# Patient Record
Sex: FEMALE | Race: WHITE | NOT HISPANIC OR LATINO | Employment: OTHER | URBAN - METROPOLITAN AREA
[De-identification: names, ages, dates, MRNs, and addresses within clinical notes are randomized per-mention and may not be internally consistent; named-entity substitution may affect disease eponyms.]

---

## 2017-03-06 ENCOUNTER — GENERIC CONVERSION - ENCOUNTER (OUTPATIENT)
Dept: OTHER | Facility: OTHER | Age: 82
End: 2017-03-06

## 2017-03-08 ENCOUNTER — ALLSCRIPTS OFFICE VISIT (OUTPATIENT)
Dept: OTHER | Facility: OTHER | Age: 82
End: 2017-03-08

## 2017-03-08 DIAGNOSIS — Z13.820 ENCOUNTER FOR SCREENING FOR OSTEOPOROSIS: ICD-10-CM

## 2017-03-08 LAB
BUN SERPL-MCNC: 14 MG/DL (ref 8–27)
BUN/CREA RATIO (HISTORICAL): 14 (ref 11–26)
CHOLEST SERPL-MCNC: 180 MG/DL (ref 100–199)
CHOLEST/HDLC SERPL: 2.8 RATIO UNITS (ref 0–4.4)
CREAT SERPL-MCNC: 0.99 MG/DL (ref 0.57–1)
EGFR AFRICAN AMERICAN (HISTORICAL): 63 ML/MIN/1.73
EGFR-AMERICAN CALC (HISTORICAL): 54 ML/MIN/1.73
HDLC SERPL-MCNC: 65 MG/DL
LDLC SERPL CALC-MCNC: 83 MG/DL (ref 0–99)
TRIGL SERPL-MCNC: 159 MG/DL (ref 0–149)
VLDLC SERPL CALC-MCNC: 32 MG/DL (ref 5–40)

## 2017-03-10 ENCOUNTER — GENERIC CONVERSION - ENCOUNTER (OUTPATIENT)
Dept: OTHER | Facility: OTHER | Age: 82
End: 2017-03-10

## 2017-03-10 LAB — HBA1C MFR BLD HPLC: 6.1 % (ref 4.8–5.6)

## 2017-03-13 ENCOUNTER — GENERIC CONVERSION - ENCOUNTER (OUTPATIENT)
Dept: OTHER | Facility: OTHER | Age: 82
End: 2017-03-13

## 2017-06-14 ENCOUNTER — ALLSCRIPTS OFFICE VISIT (OUTPATIENT)
Dept: OTHER | Facility: OTHER | Age: 82
End: 2017-06-14

## 2017-06-14 LAB
A/G RATIO (HISTORICAL): 2.1 (ref 1.2–2.2)
ALBUMIN SERPL BCP-MCNC: 4.6 G/DL (ref 3.5–4.8)
ALP SERPL-CCNC: 46 IU/L (ref 39–117)
ALT SERPL W P-5'-P-CCNC: 18 IU/L (ref 0–32)
AST SERPL W P-5'-P-CCNC: 23 IU/L (ref 0–40)
BASOPHILS # BLD AUTO: 0 X10E3/UL (ref 0–0.2)
BASOPHILS # BLD AUTO: 1 %
BILIRUB SERPL-MCNC: 0.2 MG/DL (ref 0–1.2)
BUN SERPL-MCNC: 14 MG/DL (ref 8–27)
BUN/CREA RATIO (HISTORICAL): 14 (ref 12–28)
CALCIUM SERPL-MCNC: 10.1 MG/DL (ref 8.7–10.3)
CHLORIDE SERPL-SCNC: 98 MMOL/L (ref 96–106)
CHOLEST SERPL-MCNC: 202 MG/DL (ref 100–199)
CHOLEST/HDLC SERPL: 3.1 RATIO UNITS (ref 0–4.4)
CO2 SERPL-SCNC: 23 MMOL/L (ref 18–29)
CREAT SERPL-MCNC: 0.98 MG/DL (ref 0.57–1)
DEPRECATED RDW RBC AUTO: 13.8 % (ref 12.3–15.4)
EGFR AFRICAN AMERICAN (HISTORICAL): 63 ML/MIN/1.73
EGFR-AMERICAN CALC (HISTORICAL): 55 ML/MIN/1.73
EOSINOPHIL # BLD AUTO: 0.4 X10E3/UL (ref 0–0.4)
EOSINOPHIL # BLD AUTO: 5 %
GLUCOSE SERPL-MCNC: 123 MG/DL (ref 65–99)
HBA1C MFR BLD HPLC: 6.2 % (ref 4.8–5.6)
HCT VFR BLD AUTO: 42.9 % (ref 34–46.6)
HDLC SERPL-MCNC: 66 MG/DL
HGB BLD-MCNC: 14.1 G/DL (ref 11.1–15.9)
IMM.GRANULOCYTES (CD4/8) (HISTORICAL): 0.1 X10E3/UL (ref 0–0.1)
IMM.GRANULOCYTES (CD4/8) (HISTORICAL): 1 %
LDLC SERPL CALC-MCNC: 93 MG/DL (ref 0–99)
LYMPHOCYTES # BLD AUTO: 2.4 X10E3/UL (ref 0.7–3.1)
LYMPHOCYTES # BLD AUTO: 30 %
MCH RBC QN AUTO: 30.1 PG (ref 26.6–33)
MCHC RBC AUTO-ENTMCNC: 32.9 G/DL (ref 31.5–35.7)
MCV RBC AUTO: 92 FL (ref 79–97)
MONOCYTES # BLD AUTO: 0.6 X10E3/UL (ref 0.1–0.9)
MONOCYTES (HISTORICAL): 8 %
NEUTROPHILS # BLD AUTO: 4.5 X10E3/UL (ref 1.4–7)
NEUTROPHILS # BLD AUTO: 55 %
PLATELET # BLD AUTO: 213 X10E3/UL (ref 150–379)
POTASSIUM SERPL-SCNC: 4.4 MMOL/L (ref 3.5–5.2)
RBC (HISTORICAL): 4.69 X10E6/UL (ref 3.77–5.28)
SODIUM SERPL-SCNC: 138 MMOL/L (ref 134–144)
TOT. GLOBULIN, SERUM (HISTORICAL): 2.2 G/DL (ref 1.5–4.5)
TOTAL PROTEIN (HISTORICAL): 6.8 G/DL (ref 6–8.5)
TRIGL SERPL-MCNC: 214 MG/DL (ref 0–149)
VLDLC SERPL CALC-MCNC: 43 MG/DL (ref 5–40)
WBC # BLD AUTO: 8 X10E3/UL (ref 3.4–10.8)

## 2017-06-15 ENCOUNTER — GENERIC CONVERSION - ENCOUNTER (OUTPATIENT)
Dept: OTHER | Facility: OTHER | Age: 82
End: 2017-06-15

## 2017-06-15 LAB
CREATININE, URINE (HISTORICAL): 113.8 MG/DL
MICROALBUM.,U,RANDOM (HISTORICAL): 8.5 UG/ML
MICROALBUMIN/CREATININE RATIO (HISTORICAL): 7.5 MG/G CREAT (ref 0–30)

## 2017-09-06 ENCOUNTER — ALLSCRIPTS OFFICE VISIT (OUTPATIENT)
Dept: OTHER | Facility: OTHER | Age: 82
End: 2017-09-06

## 2017-09-13 ENCOUNTER — ALLSCRIPTS OFFICE VISIT (OUTPATIENT)
Dept: OTHER | Facility: OTHER | Age: 82
End: 2017-09-13

## 2017-09-13 LAB
BILIRUB UR QL STRIP: NORMAL
CLARITY UR: NORMAL
COLOR UR: YELLOW
GLUCOSE (HISTORICAL): 50
HGB UR QL STRIP.AUTO: NORMAL
KETONES UR STRIP-MCNC: NORMAL MG/DL
LEUKOCYTE ESTERASE UR QL STRIP: 25
NITRITE UR QL STRIP: NORMAL
PH UR STRIP.AUTO: 6 [PH]
PROT UR STRIP-MCNC: NORMAL MG/DL
SP GR UR STRIP.AUTO: 1.02
UROBILINOGEN UR QL STRIP.AUTO: NORMAL

## 2017-09-18 ENCOUNTER — GENERIC CONVERSION - ENCOUNTER (OUTPATIENT)
Dept: OTHER | Facility: OTHER | Age: 82
End: 2017-09-18

## 2017-11-22 ENCOUNTER — GENERIC CONVERSION - ENCOUNTER (OUTPATIENT)
Dept: OTHER | Facility: OTHER | Age: 82
End: 2017-11-22

## 2017-12-06 ENCOUNTER — GENERIC CONVERSION - ENCOUNTER (OUTPATIENT)
Dept: OTHER | Facility: OTHER | Age: 82
End: 2017-12-06

## 2017-12-06 ENCOUNTER — ALLSCRIPTS OFFICE VISIT (OUTPATIENT)
Dept: OTHER | Facility: OTHER | Age: 82
End: 2017-12-06

## 2017-12-06 LAB
BILIRUB UR QL STRIP: NORMAL
CLARITY UR: NORMAL
COLOR UR: YELLOW
GLUCOSE (HISTORICAL): NORMAL
HBA1C MFR BLD HPLC: 5.4 %
HGB UR QL STRIP.AUTO: NORMAL
KETONES UR STRIP-MCNC: NORMAL MG/DL
LEUKOCYTE ESTERASE UR QL STRIP: NORMAL
NITRITE UR QL STRIP: NORMAL
PH UR STRIP.AUTO: 7 [PH]
PROT UR STRIP-MCNC: NORMAL MG/DL
SP GR UR STRIP.AUTO: 1.01
UROBILINOGEN UR QL STRIP.AUTO: NORMAL

## 2017-12-07 LAB — MICROALBUM.,U,RANDOM (HISTORICAL): <3 UG/ML

## 2017-12-07 NOTE — PROGRESS NOTES
Assessment    1  Diabetes mellitus (250 00) (E11 9)    Plan  Diabetes mellitus    · (LC) Microalbumin, Random Urine; Status:Active - Retrospective By ProtocolAuthorization; Requested for:56Cbf9708;    · Hemoglobin A1c- POC; Status:Complete - Retrospective Authorization;   Done:25Cpy7917 12:00AM   · Urine Dip Automated- POC; Status:Complete - Retrospective By Protocol Authorization;  Done: 23VTC0218 12:00AM   · Follow-up visit in 3 months Evaluation and Treatment  Follow-up  Status: Hold For -Scheduling  Requested for: 62WYB0068   · Have your eyes examined by an eye doctor every year ; Status:Complete;   Done:26Qxu7349 01:03PM   · It is important to take good care of your feet if you have diabetes ; Status:Complete;  Done: 35KGM2403 01:03PM   · We recommend that you follow the Mediterranean diet ; Status:Complete;   Done:59Kpc0723 01:03PM    Discussion/Summary    Decrease Metformin to 500 mg  BID  Eye exam Dr Steven Mata  Does have a living will  FS  Chief Complaint  Patient is here today for a diabetic visit, MOISES Butler/JUNO      History of Present Illness  The patient states she has been doing well with her Type II Diabetes control since the last visit  She has no comorbid illnesses  She has no known diabetic complications  She has no significant interval events  Symptoms: The patient is currently asymptomatic  Associated symptoms include no polyuria-- and-- no polydipsia  Home monitoring: The patient is not checking blood sugars at home  -- Glycemic control has been good  -- the patient reports no symptomatic hypoglycemic episodes  Medications: the patient is adherent with her medication regimen  -- She denies medication side effects  The patient is doing well with her diabetes goals  Due For: a hemoglobin A1c  Review of Systems   Constitutional: No fever, no chills, feels well, no tiredness, no recent weight gain or weight loss    Cardiovascular: No complaints of slow heart rate, no fast heart rate, no chest pain, no palpitations, no leg claudication, no lower extremity edema  Respiratory: No complaints of shortness of breath, no wheezing, no cough, no SOB on exertion, no orthopnea, no PND  Neurological: No complaints of headache, no confusion, no convulsions, no numbness, no dizziness or fainting, no tingling, no limb weakness, no difficulty walking  Active Problems  1  Acute lower UTI (599 0) (N39 0)   2  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)   3  Body mass index (BMI) 24 0-24 9, adult (V85 1) (Z68 24)   4  Diabetes mellitus (250 00) (E11 9)   5  Feeling of chest tightness (786 59) (R07 89)   6  Flu vaccine need (V04 81) (Z23)   7  Hyperlipidemia (272 4) (E78 5)   8  Nausea and/or vomiting (787 01) (R11 2)   9  Need for influenza vaccination (V04 81) (Z23)   10  Need for pneumococcal vaccination (V03 82) (Z23)   11  Osteoporosis screening (V82 81) (Z13 820)   12  Psoriasis (696 1) (L40 9)   13  Screening for diabetic retinopathy (V80 2) (Z13 5)    Past Medical History  1  History of influenza vaccination (V49 89) (Z92 29)   2  History of HTN (hypertension) (401 9) (I10)   3  History of Need for pneumococcal vaccination (V03 82) (Z23)   4  History of Osteoarthritis (715 90) (M19 90)    The active problems and past medical history were reviewed and updated today  Surgical History  1  Denied: History Of Prior Surgery    The surgical history was reviewed and updated today  Family History  Mother    1  Family history of   Father    2  Family history of    3  Family history of myocardial infarction (V17 3) (Z82 49)  Sister    4  Family history of    5  Family history of cardiac disorder (V17 49) (Z82 49)   6  Family history of diabetes mellitus (V18 0) (Z83 3)   7  Family history of malignant neoplasm of breast (V16 3) (Z80 3)  Brother    8  Family history of cardiac disorder (V17 49) (Z82 49)  Family History    9  Denied: Family history of substance abuse   10   Denied: FH: mental illness    The family history was reviewed and updated today  Social History     · Active advance directive (V49 89) (Z78 9)   · Caffeine use (V49 89) (F15 90)   · Dental care, regularly   · Never smoker   · No alcohol use  The social history was reviewed and updated today  Current Meds   1  Aleve 220 MG Oral Capsule; PRN; Therapy: (Recorded:08Jun2016) to Recorded   2  ALPRAZolam 0 5 MG Oral Tablet; TAKE 1 TABLET AT BEDTIME AS NEEDED; Last Rx:29Nov2017 Ordered   3  Caltrate 600 TABS; TAKE 1 TABLET DAILY; Therapy: (Recorded:08Jun2016) to Recorded   4  Centrum Silver Ultra Womens Oral Tablet; TAKE 1 TABLET DAILY; Therapy: (Recorded:08Jun2016) to Recorded   5  Claritin TABS; PRN; Therapy: (Recorded:08Jun2016) to Recorded   6  Fish Oil 1200 MG Oral Capsule; take 1 capsule daily; Therapy: (Recorded:08Jun2016) to Recorded   7  FLUoxetine HCl - 20 MG Oral Capsule; 1 every day; Therapy: 48ITF5127 to (Evaluate:37Hme7446)  Requested for: 45MZK5048; Last Rx:28Vus4977 Ordered   8  Gaviscon SUSP; USE AS DIRECTED; Therapy: (Recorded:08Jun2016) to Recorded   9  Glucosamine Chondr 500 Complex Oral Capsule; Therapy: (Recorded:03Uja1149) to Recorded   10  Imodium A-D 2 MG Oral Tablet; PRN; Therapy: (Recorded:08Jun2016) to Recorded   11  MetFORMIN HCl - 500 MG Oral Tablet; TAKE TWO TABLETS BY MOUTH TWICE A DAY; Therapy: 11FCT6710 to (Evaluate:16Fhn6521)  Requested for: 73HRJ0232; Last  Rx:20Nov2017 Ordered   12  Mucinex 600 MG Oral Tablet Extended Release 12 Hour; PRN; Therapy: (Recorded:08Jun2016) to Recorded   13  Naphcon-A 0 025-0 3 % Ophthalmic Solution; PRN; Therapy: (Recorded:08Jun2016) to Recorded   14  Ondansetron HCl - 4 MG Oral Tablet; 1 PO BID PRN; Therapy: 04Ooc6938 to (Evaluate:21Ulh4828)  Requested for: 50Uam5437; Last  Rx:77Lmx7933 Ordered   15  OneTouch Delica Lancets 46K Miscellaneous; USE TWICE A DAY FOR BLOOD SUGAR  TESTING  Requested for: 79EUG2613; Last Rx:60Vee2859 Ordered   16   OneTouch Lancets Miscellaneous; USE AS DIRECTED; Therapy: 59XUT7631 to (Last Rx:22Yoc4533)  Requested for: 95SZN3758 Ordered   17  OneTouch Ultra Blue In Vitro Strip; TEST TWICE DAILY; Therapy: 98QJS8084 to ()  Requested for: 80Odl5323; Last  Rx:10Hwu2235 Ordered   18  RABEprazole Sodium 20 MG Oral Tablet Delayed Release; 1 every day; Therapy: 97Ynw9656 to Recorded   19  Simvastatin 20 MG Oral Tablet; TAKE BY MOUTH ONE TABLET AT BEDTIME; Therapy: 11Cra6659 to (Evaluate:85Qww7225)  Requested for: 60LGJ5580; Last  Rx:20Nov2017 Ordered   20  Sulfacetamide Sodium-Sulfur 9 8-4 8 % External Cream; APPLY TWICE A DAY; Therapy: 86XAP8834 to (Evaluate:54Gpk5822)  Requested for: 64HQM1803; Last  Rx:55Ocf0690 Ordered   21  Sulfamethoxazole-Trimethoprim 800-160 MG Oral Tablet; TAKE 1 TABLET TWICE DAILY  UNTIL FINISHED; Therapy: 92Qpr6802 to (Evaluate:06Tpw0050)  Requested for: 77Jfx5562; Last  Rx:44Ovg9313 Ordered   22  Turmeric 500 MG Oral Capsule; take 1 capsule daily; Therapy: (Recorded:08Jun2016) to Recorded   23  Vitamin C TABS; TAKE 1 TABLET DAILY; Therapy: (Recorded:08Jun2016) to Recorded    The medication list was reviewed and updated today  Allergies  1  Dairy    Vitals  Vital Signs    Recorded: 28NFU4260 11:15AM   Temperature 97 9 F, Temporal   Heart Rate 80, R Radial   Pulse Quality Normal, R Radial   Respiration Quality Normal   Respiration 16   Systolic 932, LUE, Sitting   Diastolic 70, LUE, Sitting   Height 5 ft 4 5 in   Weight 141 lb    BMI Calculated 23 83   BSA Calculated 1 7   O2 Saturation 97       Physical Exam   Constitutional  General appearance: No acute distress, well appearing and well nourished  Pulmonary  Respiratory effort: No increased work of breathing or signs of respiratory distress  Auscultation of lungs: Clear to auscultation  Cardiovascular  Auscultation of heart: Normal rate and rhythm, normal S1 and S2, without murmurs  Additional Exam:  A1C 5 4  Results/Data  Urine Dip Automated- POC 02HDN7573 12:00AM Illa Fabian     Test Name Result Flag Reference   Color Yellow       Clarity Transparent     Leukocytes neg     Nitrite neg     Blood neg     Bilirubin neg     Urobilinogen norm     Protein neg     Ph 7     Specific Gravity 1 010     Ketone neg     Glucose norm       Hemoglobin A1c- POC 63FGO7830 12:00AM Illa Fabian     Test Name Result Flag Reference   HEMOGLOBIN A1C 5 4         Future Appointments    Date/Time Provider Specialty Site   03/14/2018 11:15 AM Keely Fabian MD Family Medicine  [facility]       Signatures   Electronically signed by : Marcelle Mera MD; Dec  6 2017  1:07PM EST                       (Author)

## 2018-01-09 NOTE — RESULT NOTES
Message   Please notify diabetes excellent   FS     Verified Results  (1) HEMOGLOBIN A1C 15Twv1656 12:51PM Keely Fabian     Test Name Result Flag Reference   Hemoglobin A1c 6 2 % H 4 8-5 6   Pre-diabetes: 5 7 - 6 4           Diabetes: >6 4           Glycemic control for adults with diabetes: <7 0

## 2018-01-11 NOTE — RESULT NOTES
Message   A1C 6 1 excellent   FS     Verified Results  (1) HEMOGLOBIN A1C 16ZRT8485 01:44PM Ashanti King     Test Name Result Flag Reference   Hemoglobin A1c 6 1 % H 4 8-5 6   Pre-diabetes: 5 7 - 6 4           Diabetes: >6 4           Glycemic control for adults with diabetes: <7 0

## 2018-01-11 NOTE — RESULT NOTES
Message   Please notify all labs perfect  FS     Verified Results  (1) LIPID PANEL, FASTING 19OMG4038 01:44PM Reno Jansen     Test Name Result Flag Reference   Cholesterol, Total 180 mg/dL  100-199   Triglycerides 159 mg/dL H 0-149   HDL Cholesterol 65 mg/dL  >39   VLDL Cholesterol Marcial 32 mg/dL  5-40   LDL Cholesterol Calc 83 mg/dL  0-99   T  Chol/HDL Ratio 2 8 ratio units  0 0-4 4   T  Chol/HDL Ratio                                                             Men  Women                                               1/2 Avg  Risk  3 4    3 3                                                   Avg Risk  5 0    4 4                                                2X Avg  Risk  9 6    7 1                                                3X Avg  Risk 23 4   11 0

## 2018-01-11 NOTE — RESULT NOTES
Message   Please review labs  Diabetes excellent control  Labs perfect  FS     Verified Results  (1) CBC/PLT/DIFF 51RMM6751 12:59PM Reginaldo Clayton     Test Name Result Flag Reference   WBC 7 7 x10E3/uL  3 4-10 8   RBC 4 69 x10E6/uL  3 77-5 28   Hemoglobin 14 0 g/dL  11 1-15 9   Hematocrit 41 9 %  34 0-46  6   MCV 89 fL  79-97   MCH 29 9 pg  26 6-33 0   MCHC 33 4 g/dL  31 5-35 7   RDW 14 5 %  12 3-15 4   Platelets 333 O92K0/LN  150-379   Neutrophils 58 %     Lymphs 26 %     Monocytes 7 %     Eos 8 %     Basos 1 %     Neutrophils (Absolute) 4 5 x10E3/uL  1 4-7 0   Lymphs (Absolute) 2 0 x10E3/uL  0 7-3 1   Monocytes(Absolute) 0 5 x10E3/uL  0 1-0 9   Eos (Absolute) 0 6 x10E3/uL H 0 0-0 4   Baso (Absolute) 0 1 x10E3/uL  0 0-0 2   Immature Granulocytes 0 %     Immature Grans (Abs) 0 0 x10E3/uL  0 0-0 1

## 2018-01-13 VITALS
DIASTOLIC BLOOD PRESSURE: 82 MMHG | WEIGHT: 147 LBS | HEIGHT: 65 IN | TEMPERATURE: 96.8 F | BODY MASS INDEX: 24.49 KG/M2 | OXYGEN SATURATION: 97 % | SYSTOLIC BLOOD PRESSURE: 138 MMHG | RESPIRATION RATE: 12 BRPM | HEART RATE: 85 BPM

## 2018-01-13 VITALS
OXYGEN SATURATION: 99 % | DIASTOLIC BLOOD PRESSURE: 70 MMHG | HEIGHT: 65 IN | TEMPERATURE: 98.9 F | WEIGHT: 146 LBS | HEART RATE: 80 BPM | RESPIRATION RATE: 16 BRPM | BODY MASS INDEX: 24.32 KG/M2 | SYSTOLIC BLOOD PRESSURE: 120 MMHG

## 2018-01-13 VITALS
HEART RATE: 84 BPM | DIASTOLIC BLOOD PRESSURE: 70 MMHG | WEIGHT: 146 LBS | HEIGHT: 65 IN | SYSTOLIC BLOOD PRESSURE: 120 MMHG | BODY MASS INDEX: 24.32 KG/M2 | RESPIRATION RATE: 16 BRPM | TEMPERATURE: 97.4 F

## 2018-01-14 VITALS
HEIGHT: 65 IN | HEART RATE: 70 BPM | RESPIRATION RATE: 16 BRPM | DIASTOLIC BLOOD PRESSURE: 62 MMHG | BODY MASS INDEX: 24.32 KG/M2 | SYSTOLIC BLOOD PRESSURE: 120 MMHG | WEIGHT: 146 LBS | TEMPERATURE: 97.9 F

## 2018-01-16 NOTE — RESULT NOTES
Verified Results  (1) CBC/PLT/DIFF 90TBR3091 01:19PM [employer]     Test Name Result Flag Reference   WBC 8 0 x10E3/uL  3 4-10 8   RBC 4 69 x10E6/uL  3 77-5 28   Hemoglobin 14 1 g/dL  11 1-15 9   Hematocrit 42 9 %  34 0-46  6   MCV 92 fL  79-97   MCH 30 1 pg  26 6-33 0   MCHC 32 9 g/dL  31 5-35 7   RDW 13 8 %  12 3-15 4   Platelets 653 U68O0/WK  150-379   Neutrophils 55 %     Lymphs 30 %     Monocytes 8 %     Eos 5 %     Basos 1 %     Neutrophils (Absolute) 4 5 x10E3/uL  1 4-7 0   Lymphs (Absolute) 2 4 x10E3/uL  0 7-3 1   Monocytes(Absolute) 0 6 x10E3/uL  0 1-0 9   Eos (Absolute) 0 4 x10E3/uL  0 0-0 4   Baso (Absolute) 0 0 x10E3/uL  0 0-0 2   Immature Granulocytes 1 %     Immature Grans (Abs) 0 1 x10E3/uL  0 0-0 1     (1) COMPREHENSIVE METABOLIC PANEL 36LVN5695 24:41NU [employer]     Test Name Result Flag Reference   Glucose, Serum 123 mg/dL H 65-99   BUN 14 mg/dL  8-27   Creatinine, Serum 0 98 mg/dL  0 57-1 00   BUN/Creatinine Ratio 14  12-28   Sodium, Serum 138 mmol/L  134-144   Potassium, Serum 4 4 mmol/L  3 5-5 2   Chloride, Serum 98 mmol/L     Carbon Dioxide, Total 23 mmol/L  18-29   Calcium, Serum 10 1 mg/dL  8 7-10 3   Protein, Total, Serum 6 8 g/dL  6 0-8 5   Albumin, Serum 4 6 g/dL  3 5-4 8   Globulin, Total 2 2 g/dL  1 5-4 5   A/G Ratio 2 1  1 2-2 2   Bilirubin, Total 0 2 mg/dL  0 0-1 2   Alkaline Phosphatase, S 46 IU/L     AST (SGOT) 23 IU/L  0-40   ALT (SGPT) 18 IU/L  0-32   eGFR If NonAfricn Am 55 mL/min/1 73 L >59   eGFR If Africn Am 63 mL/min/1 73  >59     (1) LIPID PANEL, FASTING [id number] 01:19PM Latrice Johnson     Test Name Result Flag Reference   Cholesterol, Total 202 mg/dL H 100-199   Triglycerides 214 mg/dL H 0-149   HDL Cholesterol 66 mg/dL  >39   VLDL Cholesterol Marcial 43 mg/dL H 5-40   LDL Cholesterol Calc 93 mg/dL  0-99   T  Chol/HDL Ratio 3 1 ratio units  0 0-4 4   T   Chol/HDL Ratio                                                             Men  Women 1/2 Avg  Risk  3 4    3 3                                                   Avg Risk  5 0    4 4                                                2X Avg  Risk  9 6    7 1                                                3X Avg  Risk 23 4   11 0     (1) HEMOGLOBIN A1C [id number] 01:19PM [facility] Joaquint     Test Name Result Flag Reference   Hemoglobin A1c 6 2 % H 4 8-5 6   Pre-diabetes: 5 7 - 6 4           Diabetes: >6 4           Glycemic control for adults with diabetes: <7 0

## 2018-01-17 NOTE — PROGRESS NOTES
Assessment    1  Encounter for preventive health examination (V70 0) (Z00 00)   2  Psoriasis (696 1) (L40 9)    Plan  Diabetes mellitus    · (1) HEMOGLOBIN A1C; Status:Active; Requested JJT:[id number];   Diabetes mellitus, Health Maintenance, Hyperlipidemia    · (1) CBC/PLT/DIFF; Status:Active; Requested [id number];    · (1) LIPID PANEL, FASTING; Status:Active; Requested RWZ:[id number]; Health Maintenance    · (1) COMPREHENSIVE METABOLIC PANEL; Status:Active; Requested VTY:[id number];    · (1) MICROALBUMIN CREATININE RATIO, RANDOM URINE; Status:Active; Requested  BOD:[id number];    · Always use a seat belt and shoulder strap when riding or driving a motor vehicle ;  Status:Complete;   Done: 43DYE7014   · Begin a limited exercise program ; Status:Complete;   Done: 39PVS1996   · Use a sun block product with an SPF of 15 or more ; Status:Complete;   Done:  10IGF2088   · We recommend that you follow the "Mediterranean diet "; Status:Complete;   Done:  15PIR4718   · Follow-up visit in 3 months Evaluation and Treatment  Follow-up  Status: Complete   Done: 74XSB2014  Psoriasis    · Sulfacetamide Sodium-Sulfur 9 8-4 8 % External Cream; APPLY TWICE A DAY    Discussion/Summary      Cardiovascular screening and counseling: due for a lipid panel  Diabetes screening and counseling: due for blood glucose  Colorectal cancer screening and counseling: screening is current  Breast cancer screening and counseling: screening is current  Cervical cancer screening and counseling: screening not indicated  Osteoporosis screening and counseling: screening not indicated  Abdominal aortic aneurysm screening and counseling: screening not indicated  Glaucoma screening and counseling: screening is current  HIV screening and counseling: screening not indicated  Advance Directive Planning: complete and up to date  Patient Discussion: plan discussed with the patient, follow-up visit needed in one year        Chief Complaint  Patient presents for AWV (dli)      History of Present Illness  Welcome to Medicare and Wellness Visits: The patient is being seen for the subsequent annual wellness visit  Medicare Screening and Risk Factors   Hospitalizations: no previous hospitalizations  Medicare Screening Tests Risk Questions   Abdominal aortic aneurysm risk assessment: none indicated  HIV risk assessment: none indicated  Drug and Alcohol Use: The patient has never smoked cigarettes  The patient reports never drinking alcohol  Alcohol concern:   The patient has no concerns about alcohol abuse  She has never used illicit drugs  Diet and Physical Activity: Current diet includes well balanced meals, low fat food choices, low carbohydrate food choices, low salt food choices, 3-4 servings of fruit per day, 2-3 servings of vegetables per day, 2 servings of meat per day, 1 servings of whole grains per day, 3-4 servings of simple carbohydrates per day, 0 servings of dairy products per day and 3 cups of coffee per day  Mood Disorder and Cognitive Impairment Screening: PHQ-9 Depression Scale   Over the past 2 weeks, how often have you been bothered by the following problems? 1 ) Little interest or pleasure in doing things? Not at all    2 ) Feeling down, depressed or hopeless? Not at all  Functional Ability/Level of Safety: Hearing is significantly decreased, a hearing aid is used and 2  She reports hearing difficulties  The patient is currently able to do activities of daily living without limitations, able to do instrumental activities of daily living without limitations, able to participate in social activities without limitations and able to drive without limitations   Activities of daily living details: does not need help using the phone, no transportation help needed, does not need help shopping, no meal preparation help needed, does not need help doing housework, does not need help doing laundry, does not need help managing medications and does not need help managing money  Home safety risk factors:  no unfamiliar surroundings, no loose rugs, no poor household lighting, no uneven floors, no household clutter, grab bars in the bathroom and handrails on the stairs  Advance Directives: Advance directives: living will, durable power of  for health care directives and advance directives  I agree with the patient's decisions  Co-Managers and Medical Equipment/Suppliers: See Patient Care Team      Review of Systems    Constitutional: negative  Eyes: negative  ENT: negative  Cardiovascular: negative  Respiratory: negative  Gastrointestinal: negative  Genitourinary: negative  Musculoskeletal: negative  Integumentary and Breasts: negative  Neurological: negative  Psychiatric: negative  Endocrine: negative  Hematologic and Lymphatic: negative  Active Problems    1  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)   2  Body mass index (BMI) 24 0-24 9, adult (V85 1) (Z68 24)   3  Diabetes mellitus (250 00) (E11 9)   4  Feeling of chest tightness (786 59) (R07 89)   5  Flu vaccine need (V04 81) (Z23)   6  Hyperlipidemia (272 4) (E78 5)   7  Need for influenza vaccination (V04 81) (Z23)   8  Need for pneumococcal vaccination (V03 82) (Z23)   9  Osteoporosis screening (V82 81) (Z13 820)   10  Screening for diabetic retinopathy (V80 2) (Z13 5)    Past Medical History    · History of HTN (hypertension) (401 9) (I10)   · History of Need for pneumococcal vaccination (V03 82) (Z23)   · History of Osteoarthritis (715 90) (M19 90)    The active problems and past medical history were reviewed and updated today  Surgical History    · Denied: History Of Prior Surgery    The surgical history was reviewed and updated today         Family History  Mother    · Family history of   Father    · Family history of    · Family history of myocardial infarction (V17 3) (Z82 49)  Sister    · Family history of    · Family history of cardiac disorder (V17 49) (Z82 49)   · Family history of diabetes mellitus (V18 0) (Z83 3)   · Family history of malignant neoplasm of breast (V16 3) (Z80 3)  Brother    · Family history of cardiac disorder (V17 49) (Z82 49)  Family History    · Denied: FH: mental illness    The family history was reviewed and updated today  Social History    · Caffeine use (V49 89) (F15 90)   · Dental care, regularly   · Never smoker   · No alcohol use  The social history was reviewed and updated today  Current Meds   1  Aleve 220 MG Oral Capsule; PRN; Therapy: (Recorded:2016) to Recorded   2  ALPRAZolam 0 5 MG Oral Tablet; TAKE 1 TABLET AT BEDTIME AS NEEDED; Last   Rx:2017 Ordered   3  Caltrate 600 TABS; TAKE 1 TABLET DAILY; Therapy: (Recorded:2016) to Recorded   4  Centrum Silver Ultra Womens Oral Tablet; TAKE 1 TABLET DAILY; Therapy: (Recorded:2016) to Recorded   5  Claritin TABS; PRN; Therapy: (Recorded:2016) to Recorded   6  Fish Oil 1200 MG Oral Capsule; take 1 capsule daily; Therapy: (Recorded:2016) to Recorded   7  FLUoxetine HCl - 20 MG Oral Capsule; 1 every day; Therapy: 09THI8948 to (Evaluate:27Hua8750)  Requested for: 2016; Last   Rx:2016 Ordered   8  Gaviscon SUSP; USE AS DIRECTED; Therapy: (Recorded:2016) to Recorded   9  Imodium A-D 2 MG Oral Tablet; PRN; Therapy: (Recorded:2016) to Recorded   10  MetFORMIN HCl - 500 MG Oral Tablet; TAKE 2 TABLETS IN THE MORNING AND 2    TABLETS IN THE EVENING; Therapy: 78REM7592 to (Last Rx:61Lzs8108)  Requested for: 40PTL8190 Ordered   11  Mucinex 600 MG Oral Tablet Extended Release 12 Hour; PRN; Therapy: (Recorded:2016) to Recorded   12  Naphcon-A 0 025-0 3 % Ophthalmic Solution; PRN; Therapy: (Recorded:2016) to Recorded   13   OneTouch Delica Lancets 38D Miscellaneous; USE TWICE A DAY FOR BLOOD SUGAR    TESTING  Requested for: 49MJU4511; Last Rx:91Qyn3616 Ordered   14  OneTouch Lancets Miscellaneous; USE AS DIRECTED; Therapy: 24CSA7671 to (Last Rx:95Tfy9585)  Requested for: 86RWI0541 Ordered   15  OneTouch Ultra Blue In Vitro Strip; TEST TWICE DAILY; Therapy: 07UQQ2087 to (Evaluate:78Zet2608)  Requested for: 21Anh8194; Last    Rx:91Gld9986 Ordered   16  RABEprazole Sodium 20 MG Oral Tablet Delayed Release; 1 every day; Therapy: 19Zeh4286 to Recorded   17  Simvastatin 20 MG Oral Tablet; TAKE BY MOUTH ONE TABLET AT BEDTIME; Therapy: 42Dcv0320 to (Evaluate:16Nov2017)  Requested for: 21Nov2016; Last    Rx:21Nov2016 Ordered   18  Turmeric 500 MG Oral Capsule; take 1 capsule daily; Therapy: (Recorded:08Jun2016) to Recorded   19  Vitamin C TABS; TAKE 1 TABLET DAILY; Therapy: (Recorded:08Jun2016) to Recorded    The medication list was reviewed and updated today  Allergies    1  No Known Drug Allergies    2  Dairy    Immunizations   1 2 3    Influenza  29-Oct-2014 22-Oct-2015 14-Sep-2016    PCV  08-Mar-2017      PPSV  21-Oct-2014      Tdap  21-Nov-2008       Vitals  Signs    Temperature: 97 4 F  Heart Rate: 84  Respiration: 16  Systolic: 865  Diastolic: 70  Height: 5 ft 4 5 in  Weight: 146 lb   BMI Calculated: 24 67  BSA Calculated: 1 72    Physical Exam    Constitutional   General appearance: No acute distress, well appearing and well nourished  Eyes   Conjunctiva and lids: No swelling, erythema or discharge  Pupils and irises: Equal, round, reactive to light  Ophthalmoscopic examination: Normal fundi and optic discs  Ears, Nose, Mouth, and Throat   External inspection of ears and nose: Normal     Otoscopic examination: Tympanic membranes translucent with normal light reflex  Canals patent without erythema  Hearing: Normal     Nasal mucosa, septum, and turbinates: Normal without edema or erythema  Lips, teeth, and gums: Normal, good dentition  Oropharynx: Normal with no erythema, edema, exudate or lesions      Neck Neck: Supple, symmetric, trachea midline, no masses  Thyroid: Normal, no thyromegaly  Pulmonary   Respiratory effort: No increased work of breathing or signs of respiratory distress  Auscultation of lungs: Clear to auscultation  Cardiovascular   Auscultation of heart: Normal rate and rhythm, normal S1 and S2, no murmurs  Carotid pulses: 2+ bilaterally  Abdominal aorta: Normal     Examination of extremities for edema and/or varicosities: Normal     Abdomen   Abdomen: Non-tender, no masses  Liver and spleen: No hepatomegaly or splenomegaly  Lymphatic   Palpation of lymph nodes in neck: No lymphadenopathy  Musculoskeletal   Gait and station: Normal     Digits and nails: Normal without clubbing or cyanosis  Joints, bones, and muscles: Normal     Range of motion: Normal     Stability: Normal     Muscle strength/tone: Normal     Skin   Skin and subcutaneous tissue: Normal without rashes or lesions  Palpation of skin and subcutaneous tissue: Normal turgor  Neurologic   Cranial nerves: Cranial nerves II-XII intact  Reflexes: 2+ and symmetric  Sensation: No sensory loss  Psychiatric   Judgment and insight: Normal     Orientation to person, place, and time: Normal     Recent and remote memory: Intact  Mood and affect: Normal        Results/Data  *VB - Urinary Incontinence Screen (Dx Z13 89 Screen for UI) 27UOH6695 11:15AM      Test Name Result Flag Reference   Urinary Incontinence Assessment [id number]       PHQ-2 Adult Depression Screening 14Jun2017 11:14AM User, [facility]     Test Name Result Flag Reference   PHQ-2 Adult Depression Score 0     Over the last two weeks, how often have you been bothered by any of the following problems?   Little interest or pleasure in doing things: Not at all - 0  Feeling down, depressed, or hopeless: Not at all - 0   PHQ-2 Adult Depression Screening Negative       Falls Risk Assessment (Dx Z13 89 Screen for Neurologic Disorder) 17VLH2889 11:14AM User, [facility]     Test Name Result Flag Reference   Falls Risk      No falls in the past year       Future Appointments    Date/Time Provider Specialty Site   09/06/2017 11:15 AM Donnie Magana MD [address] [facility]   09/13/2017 11:15 AM Donnie Magana  [facility]     Signatures   Electronically signed by : Sebastien Ordoñez MD; Jun 14 2017  1:14PM EST                       (Author)

## 2018-01-22 VITALS
WEIGHT: 142 LBS | DIASTOLIC BLOOD PRESSURE: 70 MMHG | SYSTOLIC BLOOD PRESSURE: 140 MMHG | RESPIRATION RATE: 16 BRPM | HEART RATE: 96 BPM | BODY MASS INDEX: 23.66 KG/M2 | HEIGHT: 65 IN | TEMPERATURE: 97.4 F

## 2018-01-23 VITALS
BODY MASS INDEX: 23.49 KG/M2 | TEMPERATURE: 97.9 F | SYSTOLIC BLOOD PRESSURE: 138 MMHG | HEART RATE: 80 BPM | OXYGEN SATURATION: 97 % | WEIGHT: 141 LBS | HEIGHT: 65 IN | DIASTOLIC BLOOD PRESSURE: 70 MMHG | RESPIRATION RATE: 16 BRPM

## 2018-01-23 NOTE — PROGRESS NOTES
Assessment    1  Encounter for preventive health examination (V70 0) (Z00 00)    Plan  Diabetes mellitus    · (1) HEMOGLOBIN A1C; Status:Active; Requested VRA:[id number];    · (1) MICROALBUMIN CREATININE RATIO, RANDOM URINE; Status:Active; Requested  EYS:[id number];   Diabetes mellitus, Health Maintenance    · Urine Dip Automated- POC; Status:Resulted - Requires Verification,Retrospective  Authorization;   Done: 87GOJ4601 01:27PM  Diabetes mellitus, Hyperlipidemia    · (1) CBC/PLT/DIFF; Status:Active; Requested GIR:[id number];    · (1) LIPID PANEL, FASTING; Status:Active; Requested RDE:96WQX4743;    · (1) TSH; Status:Active; Requested ZGV:57NOA5279;    · (1) COMPREHENSIVE METABOLIC PANEL; Status:Active; Requested MIKHAIL:77PMX6898;    · (1) VITAMIN B12; Status:Active; Requested CEE:10JBC5845; Health Maintenance    · Follow-up visit in 1 year Evaluation and Treatment  Follow-up  Status: Hold For -  Scheduling  Requested for: 14IYQ3886   · Always use a seat belt and shoulder strap when riding or driving a motor vehicle ;  Status:Complete;   Done: 13HRA0572   · These are things you can do to prevent falls in and around the home ; Status:Complete;    Done: 48GIV2633   · We recommend that you follow the "Mediterranean diet "; Status:Complete;   Done:  59UPC4120    Discussion/Summary  Impression: Subsequent Annual Wellness Visit  Cardiovascular screening and counseling: screening is current  Diabetes screening and counseling: screening is current  Colorectal cancer screening and counseling: screening is current  Breast cancer screening and counseling: screening is current  Cervical cancer screening and counseling: screening not indicated  Osteoporosis screening and counseling: screening not indicated  Abdominal aortic aneurysm screening and counseling: screening not indicated  Glaucoma screening and counseling: screening is current  HIV screening and counseling: screening not indicated     Advance Directive Planning: complete and up to date  Chief Complaint  Patient is here today for her annual wellness visit, MOISES Butler/JUNO      History of Present Illness  Welcome to Medicare and Wellness Visits: The patient is being seen for the subsequent annual wellness visit  Medicare Screening and Risk Factors   Hospitalizations: no previous hospitalizations  Once per lifetime medicare screening tests: ECG and AAA screening US has not yet been done  Medicare Screening Tests Risk Questions   Abdominal aortic aneurysm risk assessment: none indicated  Osteoporosis risk assessment: female gender  HIV risk assessment: none indicated  Drug and Alcohol Use: The patient has never smoked cigarettes  The patient reports never drinking alcohol  Alcohol concern:   The patient has no concerns about alcohol abuse  She has never used illicit drugs  Diet and Physical Activity: Current diet includes well balanced meals, low fat food choices, low carbohydrate food choices, low salt food choices, 4 servings of fruit per day, 2 servings of vegetables per day, 2 servings of meat per day, 2 servings of whole grains per day, 3 servings of simple carbohydrates per day, 0 servings of dairy products per day, 4 cups of coffee per day, 0 cups of tea per day, 0 cans of regular soda per day and 6 cans of diet soda per day  She exercises daily and exercises 5 times per week  Exercise: walking 60 minutes per day  Mood Disorder and Cognitive Impairment Screening: PHQ-9 Depression Scale   Depression screening  no significant symptoms  She denies feeling down, depressed, or hopeless over the past two weeks  She denies feeling little interest or pleasure in doing things over the past two weeks     Cognitive impairment screening: denies difficulty learning/retaining new information, denies difficulty handling complex tasks, denies difficulty with reasoning, denies difficulty with spatial ability and orientation, denies difficulty with language and denies difficulty with behavior  Functional Ability/Level of Safety: Hearing is slightly decreased, slightly decreased in the left ear and a hearing aid is used  She reports hearing difficulties  The patient is currently able to do activities of daily living without limitations  Activities of daily living details: does not need help using the phone, no transportation help needed, does not need help shopping, no meal preparation help needed, does not need help doing housework, does not need help doing laundry, does not need help managing medications and does not need help managing money  Fall risk factors: The patient fell 0 times in the past 12 months , no polypharmacy, no alcohol use, no mobility impairment, no deconditioning, no postural hypotension, no sedative use, no visual impairment, no urinary incontinence, no antihypertensive use, no cognitive impairment, up and go test was normal and no previous fall  Home safety risk factors:  no unfamiliar surroundings, no loose rugs, no poor household lighting, no uneven floors, no household clutter, grab bars in the bathroom and handrails on the stairs  Advance Directives: Advance directives: living will, durable power of  for health care directives and advance directives  end of life decisions were reviewed with the patient  Co-Managers and Medical Equipment/Suppliers: See Patient Care Team      Review of Systems    Constitutional: negative  Eyes: negative  ENT: negative  Cardiovascular: negative  Respiratory: negative  Gastrointestinal: negative  Genitourinary: negative  Musculoskeletal: negative  Integumentary and Breasts: negative  Neurological: negative  Psychiatric: negative  Endocrine: negative  Hematologic and Lymphatic: negative  Active Problems    1  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)   2  Diabetes mellitus (250 00) (E11 9)   3  Feeling of chest tightness (786 59) (R07 89)   4   Flu vaccine need (V04 81) (Z23)   5  Hyperlipidemia (272 4) (E78 5)   6  Need for pneumococcal vaccination (V03 82) (Z23)    Past Medical History    · History of HTN (hypertension) (401 9) (I10)   · History of Osteoarthritis (715 90) (M19 90)    The active problems and past medical history were reviewed and updated today  Surgical History    · Denied: History Of Prior Surgery    The surgical history was reviewed and updated today  Family History  Family History    · Denied: FH: mental illness    The family history was reviewed and updated today  Social History    · Caffeine use (V49 89) (F15 90)   · Dental care, regularly   · Never smoker   · No alcohol use  The social history was reviewed and updated today  Current Meds   1  Aleve 220 MG Oral Capsule; PRN; Therapy: (Recorded:08Jun2016) to Recorded   2  ALPRAZolam 0 5 MG Oral Tablet; TAKE 1 TABLET Bedtime; Last Rx:02Jun2016 Ordered   3  Caltrate 600 TABS; TAKE 1 TABLET DAILY; Therapy: (Recorded:08Jun2016) to Recorded   4  Centrum Silver Ultra Womens Oral Tablet; TAKE 1 TABLET DAILY; Therapy: (Recorded:08Jun2016) to Recorded   5  Claritin TABS; PRN; Therapy: (Recorded:08Jun2016) to Recorded   6  Clobetasol Propionate 0 05 % External Ointment; Therapy: 56SAU3934 to Recorded   7  Fish Oil 1200 MG Oral Capsule; take 1 capsule daily; Therapy: (Recorded:08Jun2016) to Recorded   8  FLUoxetine HCl - 20 MG Oral Capsule; 1 every day; Therapy: 24YGQ9880 to (Last Rx:44Owy4915)  Requested for: 69JYK1882 Ordered   9  Gaviscon SUSP; USE AS DIRECTED; Therapy: (Recorded:08Jun2016) to Recorded   10  Imodium A-D 2 MG Oral Tablet; PRN; Therapy: (Recorded:08Jun2016) to Recorded   11  MetFORMIN HCl - 500 MG Oral Tablet; TAKE TWO TABLETS IN THE MORNING AND    TWO TABLETS IN THE EVENING; Therapy: 37YQL7286 to (Evaluate:26Snz0959)  Requested for: 36Wrz0283; Last    Rx:55Nqp9719 Ordered   12   Mucinex 600 MG Oral Tablet Extended Release 12 Hour; PRN; Therapy: (Recorded:08Jun2016) to Recorded   13  Naphcon-A 0 025-0 3 % Ophthalmic Solution; PRN; Therapy: (Recorded:08Jun2016) to Recorded   14  OneTouch Delica Lancets MISC; test twice daily; Therapy: (Recorded:02Apr2015) to Recorded   15  OneTouch Lancets Miscellaneous; USE AS DIRECTED; Therapy: 59CII5819 to (Last CS:29LBP9948)  Requested for: 51MFK9675 Ordered   16  OneTouch Ultra Blue In Vitro Strip; TEST TWICE DAILY; Therapy: 68VAQ9458 to (Rodrigo Hernandez)  Requested for: 46XQU2960; Last    Rx:91Xcr1284 Ordered   17  RABEprazole Sodium 20 MG Oral Tablet Delayed Release; 1 every day; Therapy: 27Tgd8215 to Recorded   18  Simvastatin 20 MG Oral Tablet; Take 1 tablet daily; Therapy: 81Fhq9655 to (Last Rx:58Rih3492)  Requested for: 01GDP2034 Ordered   19  Turmeric 500 MG Oral Capsule; take 1 capsule daily; Therapy: (Recorded:08Jun2016) to Recorded   20  Vitamin C TABS; TAKE 1 TABLET DAILY; Therapy: (Recorded:08Jun2016) to Recorded    The medication list was reviewed and updated today  Allergies    1  No Known Drug Allergies    2  Dairy    Immunizations   1 2    Influenza  79KMB9459 70Yrb9799    Pneumococcal  91EPI6728      Vitals  Signs [Data Includes: Current Encounter]    Temperature: 98 7 F, Tympanic  Heart Rate: 80, L Radial  Pulse Quality: Normal, L Radial  Respiration: 16  Respiration Quality: Normal  Systolic: 117, LUE, Sitting  Diastolic: 70, LUE, Sitting  Height: 5 ft 4 in  Weight: 145 lb   BMI Calculated: 24 89  BSA Calculated: 1 71    Physical Exam    Constitutional   General appearance: No acute distress, well appearing and well nourished  Eyes   Conjunctiva and lids: No swelling, erythema or discharge  Pupils and irises: Equal, round, reactive to light  Ophthalmoscopic examination: Normal fundi and optic discs      Ears, Nose, Mouth, and Throat   External inspection of ears and nose: Normal     Otoscopic examination: Tympanic membranes translucent with normal light reflex  Canals patent without erythema  Hearing: Normal     Nasal mucosa, septum, and turbinates: Normal without edema or erythema  Lips, teeth, and gums: Normal, good dentition  Oropharynx: Normal with no erythema, edema, exudate or lesions  Neck   Neck: Supple, symmetric, trachea midline, no masses  Thyroid: Normal, no thyromegaly  Pulmonary   Respiratory effort: No increased work of breathing or signs of respiratory distress  Auscultation of lungs: Clear to auscultation  Cardiovascular   Auscultation of heart: Normal rate and rhythm, normal S1 and S2, no murmurs  Carotid pulses: 2+ bilaterally  Abdominal aorta: Normal     Examination of extremities for edema and/or varicosities: Normal     Abdomen   Abdomen: Non-tender, no masses  Liver and spleen: No hepatomegaly or splenomegaly  Lymphatic   Palpation of lymph nodes in neck: No lymphadenopathy  Musculoskeletal   Gait and station: Normal     Digits and nails: Normal without clubbing or cyanosis  Joints, bones, and muscles: Normal     Range of motion: Normal     Stability: Normal     Muscle strength/tone: Normal     Skin   Skin and subcutaneous tissue: Normal without rashes or lesions  Palpation of skin and subcutaneous tissue: Normal turgor  Neurologic   Cranial nerves: Cranial nerves II-XII intact  Reflexes: 2+ and symmetric  Sensation: No sensory loss  Psychiatric   Judgment and insight: Normal     Orientation to person, place, and time: Normal     Recent and remote memory: Intact      Mood and affect: Normal        Results/Data  Urine Dip Automated- POC 74YMB5620 01:27PM Chevy Dawson     Test Name Result Flag Reference   Color Yellow     Clarity Transparent     Leukocytes negative     Nitrite negative     Blood negative     Bilirubin negative     Urobilinogen normal     Protein negative     Ph 6     Specific Gravity 1 010     Ketone negative     Glucose normal       PHQ-2 Adult Depression Screening 18TWX9631 11:19AM User, Robert Applebaum MDs     Test Name Result Flag Reference   PHQ-2 Adult Depression Score 0     Over the last two weeks, how often have you been bothered by any of the following problems?   Little interest or pleasure in doing things: Not at all - 0  Feeling down, depressed, or hopeless: Not at all - 0   PHQ-2 Adult Depression Screening Negative       Falls Risk Assessment (Dx V80 09 Screen for Neurologic Disorder) 54CDK3750 11:19AM User, Robert Applebaum MDs     Test Name Result Flag Reference   Falls Risk      No falls in the past year       Future Appointments    Date/Time Provider Specialty Site   09/08/2016 11:15 AM Angy Puga MD [facility]     Signatures   Electronically signed by : Mickie Mcghee MD; Jun 8 2016  2:01PM EST                       (Author)

## 2018-01-25 ENCOUNTER — TELEPHONE (OUTPATIENT)
Dept: FAMILY MEDICINE CLINIC | Facility: CLINIC | Age: 83
End: 2018-01-25

## 2018-01-25 DIAGNOSIS — F41.9 ANXIETY: Primary | ICD-10-CM

## 2018-01-25 RX ORDER — ALPRAZOLAM 0.5 MG/1
0.5 TABLET ORAL 2 TIMES DAILY PRN
Qty: 45 TABLET | Refills: 0 | Status: SHIPPED | OUTPATIENT
Start: 2018-01-25 | End: 2018-03-13 | Stop reason: SDUPTHER

## 2018-01-25 NOTE — TELEPHONE ENCOUNTER
NEEDS REFILL ON   ALPRAZOLAM 0 0RG 1 DAILY,   DELICA ONE TOUCH LANCETS TEST 2X DAY, AND ONE TOUCH DELICA TESTING STRIPS    Juan Burnett 411

## 2018-03-13 DIAGNOSIS — F41.9 ANXIETY: ICD-10-CM

## 2018-03-13 RX ORDER — RABEPRAZOLE SODIUM 20 MG/1
TABLET, DELAYED RELEASE ORAL DAILY
COMMUNITY
Start: 2014-08-08 | End: 2019-04-17 | Stop reason: ALTCHOICE

## 2018-03-13 RX ORDER — ONDANSETRON 4 MG/1
TABLET, FILM COATED ORAL 2 TIMES DAILY PRN
COMMUNITY
Start: 2017-09-18 | End: 2019-04-17 | Stop reason: ALTCHOICE

## 2018-03-13 RX ORDER — MAGNESIUM HYDROXIDE 400 MG/5ML
SUSPENSION, ORAL (FINAL DOSE FORM) ORAL
COMMUNITY
End: 2021-02-11 | Stop reason: ALTCHOICE

## 2018-03-13 RX ORDER — SULFAMETHOXAZOLE AND TRIMETHOPRIM 800; 160 MG/1; MG/1
1 TABLET ORAL 2 TIMES DAILY
COMMUNITY
Start: 2017-09-13 | End: 2019-04-17 | Stop reason: ALTCHOICE

## 2018-03-13 RX ORDER — RIBOFLAVIN (VITAMIN B2) 100 MG
1 TABLET ORAL DAILY
COMMUNITY
End: 2021-02-11 | Stop reason: ALTCHOICE

## 2018-03-13 RX ORDER — PROPRANOLOL/HYDROCHLOROTHIAZID 40 MG-25MG
1 TABLET ORAL DAILY
COMMUNITY
End: 2020-11-11 | Stop reason: ALTCHOICE

## 2018-03-13 RX ORDER — SIMVASTATIN 20 MG
TABLET ORAL
COMMUNITY
Start: 2013-09-11 | End: 2018-08-02 | Stop reason: SDUPTHER

## 2018-03-13 RX ORDER — ALPRAZOLAM 0.5 MG/1
1 TABLET ORAL
COMMUNITY
End: 2018-04-16 | Stop reason: SDUPTHER

## 2018-03-13 RX ORDER — LANCETS 33 GAUGE
EACH MISCELLANEOUS
COMMUNITY
End: 2018-09-12 | Stop reason: SDUPTHER

## 2018-03-13 RX ORDER — SULFACETAMIDE SODIUM, SULFUR 98; 48 MG/G; MG/G
CREAM TOPICAL 2 TIMES DAILY
COMMUNITY
Start: 2017-06-14 | End: 2020-06-26 | Stop reason: ALTCHOICE

## 2018-03-13 RX ORDER — LOPERAMIDE HYDROCHLORIDE 2 MG/1
TABLET ORAL
COMMUNITY
End: 2019-06-29 | Stop reason: ALTCHOICE

## 2018-03-13 RX ORDER — ALUMINUM HYDROXIDE AND MAGNESIUM CARBONATE 95; 358 MG/15ML; MG/15ML
LIQUID ORAL DAILY PRN
COMMUNITY
End: 2022-05-26

## 2018-03-13 RX ORDER — AMOXICILLIN 500 MG
1 CAPSULE ORAL DAILY
COMMUNITY
End: 2021-02-11 | Stop reason: ALTCHOICE

## 2018-03-13 RX ORDER — GUAIFENESIN 600 MG
TABLET, EXTENDED RELEASE 12 HR ORAL
COMMUNITY
End: 2019-06-29 | Stop reason: ALTCHOICE

## 2018-03-13 RX ORDER — ALPRAZOLAM 0.5 MG/1
0.5 TABLET ORAL
Qty: 30 TABLET | Refills: 0 | Status: SHIPPED | OUTPATIENT
Start: 2018-03-13 | End: 2018-06-14 | Stop reason: SDUPTHER

## 2018-03-13 RX ORDER — FLUOXETINE HYDROCHLORIDE 20 MG/1
CAPSULE ORAL DAILY
COMMUNITY
Start: 2014-05-07 | End: 2018-08-03 | Stop reason: SDUPTHER

## 2018-03-14 ENCOUNTER — OFFICE VISIT (OUTPATIENT)
Dept: FAMILY MEDICINE CLINIC | Facility: CLINIC | Age: 83
End: 2018-03-14
Payer: COMMERCIAL

## 2018-03-14 VITALS
TEMPERATURE: 97.9 F | DIASTOLIC BLOOD PRESSURE: 76 MMHG | SYSTOLIC BLOOD PRESSURE: 136 MMHG | RESPIRATION RATE: 16 BRPM | OXYGEN SATURATION: 93 % | HEART RATE: 81 BPM | WEIGHT: 143 LBS | BODY MASS INDEX: 24.17 KG/M2

## 2018-03-14 DIAGNOSIS — E11.9 TYPE 2 DIABETES MELLITUS WITHOUT COMPLICATION, WITHOUT LONG-TERM CURRENT USE OF INSULIN (HCC): Primary | ICD-10-CM

## 2018-03-14 LAB — SL AMB POCT HEMOGLOBIN AIC: 5.8

## 2018-03-14 PROCEDURE — 3725F SCREEN DEPRESSION PERFORMED: CPT | Performed by: FAMILY MEDICINE

## 2018-03-14 PROCEDURE — 1101F PT FALLS ASSESS-DOCD LE1/YR: CPT | Performed by: FAMILY MEDICINE

## 2018-03-14 PROCEDURE — 83036 HEMOGLOBIN GLYCOSYLATED A1C: CPT | Performed by: FAMILY MEDICINE

## 2018-03-14 PROCEDURE — 99213 OFFICE O/P EST LOW 20 MIN: CPT | Performed by: FAMILY MEDICINE

## 2018-03-14 NOTE — PROGRESS NOTES
Assessment/Plan:  Type 2 diabetes mellitus  A1c 5 8  Continue medications as noted  Follow-up visit 3 months  Mediterranean diet  Diagnoses and all orders for this visit:    Type 2 diabetes mellitus without complication, without long-term current use of insulin (HCC)    Other orders  -     Omega-3 Fatty Acids (FISH OIL) 1200 MG CAPS; Take 1 capsule by mouth daily  -     FLUoxetine (PROZAC) 20 mg capsule; Take by mouth daily  -     aluminum hydroxide-magnesium carbonate (GAVISCON)  mg/15 mL oral suspension; Take by mouth  -     Glucosamine-Chondroit-Vit C-Mn (GLUCOSAMINE CHONDR 500 COMPLEX) CAPS; Take by mouth  -     loperamide (IMODIUM A-D) 2 MG tablet; Take by mouth  -     metFORMIN (GLUCOPHAGE) 500 mg tablet; Take 2 tablets by mouth 2 (two) times a day  -     guaiFENesin (MUCINEX) 600 mg 12 hr tablet; Take by mouth  -     naphazoline-pheniramine (NAPHCON-A) 0 025-0 3 % ophthalmic solution; Apply to eye  -     ondansetron (ZOFRAN) 4 mg tablet; Take by mouth 2 (two) times a day as needed  -     ONETOUCH DELICA LANCETS 33L MISC; by Does not apply route  -     ONE TOUCH LANCETS MISC; by Does not apply route  -     glucose blood (ONE TOUCH ULTRA TEST) test strip; by In Vitro route 2 (two) times a day  -     RABEprazole (ACIPHEX) 20 MG tablet; Take by mouth daily  -     simvastatin (ZOCOR) 20 mg tablet; Take by mouth  -     Sulfacetamide Sodium-Sulfur 9 8-4 8 % CREA; Apply topically 2 (two) times a day  -     sulfamethoxazole-trimethoprim (BACTRIM DS) 800-160 mg per tablet; Take 1 tablet by mouth 2 (two) times a day  -     Turmeric (RA TURMERIC) 500 MG CAPS; Take 1 capsule by mouth daily  -     Ascorbic Acid (VITAMIN C) 100 MG tablet; Take 1 tablet by mouth daily  -     ALPRAZolam (XANAX) 0 5 mg tablet; Take 1 tablet by mouth          Subjective:     Patient ID: Tiago Harrell is a [de-identified] y o  female  This is an 53-year-old female who is following up for diabetes mellitus type 2    She is feeling well and diabetes has been well controlled  Review of Systems   Constitutional: Negative  Respiratory: Negative  Cardiovascular: Negative  Neurological: Negative  Objective:     Physical Exam   Constitutional: She is oriented to person, place, and time  She appears well-developed and well-nourished  HENT:   Head: Normocephalic and atraumatic  Cardiovascular: Normal rate, regular rhythm and normal heart sounds  Exam reveals no gallop and no friction rub  No murmur heard  Pulmonary/Chest: Effort normal and breath sounds normal  No respiratory distress  She has no wheezes  She has no rales  She exhibits no tenderness  Neurological: She is alert and oriented to person, place, and time  No cranial nerve deficit  She exhibits normal muscle tone  Coordination normal    Psychiatric: She has a normal mood and affect   Her behavior is normal  Judgment and thought content normal

## 2018-03-16 DIAGNOSIS — F41.9 ANXIETY: ICD-10-CM

## 2018-03-16 RX ORDER — ALPRAZOLAM 0.5 MG/1
0.5 TABLET ORAL
Qty: 90 TABLET | Refills: 1 | OUTPATIENT
Start: 2018-03-16

## 2018-04-16 DIAGNOSIS — F41.9 ANXIETY: ICD-10-CM

## 2018-04-16 DIAGNOSIS — F41.9 ANXIETY: Primary | ICD-10-CM

## 2018-04-16 RX ORDER — ALPRAZOLAM 0.5 MG/1
0.5 TABLET ORAL
Qty: 30 TABLET | Refills: 0 | OUTPATIENT
Start: 2018-04-16 | End: 2018-05-16

## 2018-04-16 RX ORDER — ALPRAZOLAM 0.5 MG/1
0.5 TABLET ORAL DAILY
Qty: 30 TABLET | Refills: 0 | Status: SHIPPED | OUTPATIENT
Start: 2018-04-16 | End: 2018-05-14 | Stop reason: SDUPTHER

## 2018-05-14 DIAGNOSIS — F41.9 ANXIETY: ICD-10-CM

## 2018-05-14 RX ORDER — ALPRAZOLAM 0.5 MG/1
0.5 TABLET ORAL DAILY
Qty: 30 TABLET | Refills: 0 | Status: SHIPPED | OUTPATIENT
Start: 2018-05-14 | End: 2018-06-07 | Stop reason: SDUPTHER

## 2018-06-07 ENCOUNTER — OFFICE VISIT (OUTPATIENT)
Dept: FAMILY MEDICINE CLINIC | Facility: CLINIC | Age: 83
End: 2018-06-07
Payer: COMMERCIAL

## 2018-06-07 VITALS
HEART RATE: 78 BPM | OXYGEN SATURATION: 97 % | WEIGHT: 144 LBS | RESPIRATION RATE: 18 BRPM | BODY MASS INDEX: 24.34 KG/M2 | TEMPERATURE: 97.7 F | DIASTOLIC BLOOD PRESSURE: 70 MMHG | SYSTOLIC BLOOD PRESSURE: 130 MMHG
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
## 2018-06-07 DIAGNOSIS — I10 ESSENTIAL HYPERTENSION: ICD-10-CM

## 2018-06-07 PROCEDURE — 99213 OFFICE O/P EST LOW 20 MIN: CPT | Performed by: FAMILY MEDICINE

## 2018-06-07 PROCEDURE — 90471 IMMUNIZATION ADMIN: CPT

## 2018-06-07 PROCEDURE — 3078F DIAST BP <80 MM HG: CPT | Performed by: FAMILY MEDICINE

## 2018-06-07 PROCEDURE — 90714 TD VACC NO PRESV 7 YRS+ IM: CPT

## 2018-06-07 PROCEDURE — 3075F SYST BP GE 130 - 139MM HG: CPT | Performed by: FAMILY MEDICINE

## 2018-06-07 RX ORDER — LORATADINE 10 MG/1
1 CAPSULE, LIQUID FILLED ORAL DAILY PRN
COMMUNITY
End: 2021-08-19 | Stop reason: HOSPADM

## 2018-06-07 NOTE — PROGRESS NOTES
Assessment/Plan: Essential Hypertension        Blood pressure stable  Mediterranean diet  Walking for exercise  Follow up for A1C  Subjective:     Patient ID: Eleazar Wong is a [de-identified] y o  female  This is an [de-identified] yo female following up for essential hypertension  Review of Systems   Constitutional: Negative  Respiratory: Negative  Cardiovascular: Negative  Neurological: Negative  Objective:     Physical Exam   Constitutional: She is oriented to person, place, and time  She appears well-developed and well-nourished  HENT:   Head: Normocephalic and atraumatic  Cardiovascular: Normal rate, regular rhythm and normal heart sounds  Exam reveals no gallop and no friction rub  No murmur heard  Pulmonary/Chest: Effort normal and breath sounds normal  No respiratory distress  She has no wheezes  She has no rales  She exhibits no tenderness  Neurological: She is alert and oriented to person, place, and time  No cranial nerve deficit  Skin:   Superficial cat scratch right forearm  No signs of infection

## 2018-06-13 ENCOUNTER — OFFICE VISIT (OUTPATIENT)
Dept: FAMILY MEDICINE CLINIC | Facility: CLINIC | Age: 83
End: 2018-06-13
Payer: COMMERCIAL

## 2018-06-13 VITALS
HEART RATE: 72 BPM | DIASTOLIC BLOOD PRESSURE: 78 MMHG | WEIGHT: 143 LBS | TEMPERATURE: 97.6 F | SYSTOLIC BLOOD PRESSURE: 112 MMHG | BODY MASS INDEX: 24.17 KG/M2

## 2018-06-13 DIAGNOSIS — E11.9 TYPE 2 DIABETES MELLITUS WITHOUT COMPLICATION, WITHOUT LONG-TERM CURRENT USE OF INSULIN (HCC): Primary | ICD-10-CM

## 2018-06-13 LAB — SL AMB POCT HEMOGLOBIN AIC: 5.8

## 2018-06-13 PROCEDURE — 99213 OFFICE O/P EST LOW 20 MIN: CPT | Performed by: FAMILY MEDICINE

## 2018-06-13 PROCEDURE — 83036 HEMOGLOBIN GLYCOSYLATED A1C: CPT | Performed by: FAMILY MEDICINE

## 2018-06-13 NOTE — PROGRESS NOTES
Assessment/Plan:  Type 2 diabetes mellitus without complication  Continue current medication regimen  Walking for exercise  Mediterranean diet  Follow-up 6 months  Diagnoses and all orders for this visit:    Type 2 diabetes mellitus without complication, without long-term current use of insulin ([facility]  -     POCT hemoglobin A1c          Subjective:     Patient ID: Heather Hazel is a [de-identified] y o  female  This is an 72-year-old female who presents for follow-up of type 2 diabetes mellitus without complication  Review of Systems   Constitutional: Negative  Respiratory: Negative  Cardiovascular: Negative  Neurological: Negative  Objective:     Physical Exam   Constitutional: She is oriented to person, place, and time  She appears well-developed and well-nourished  HENT:   Head: Normocephalic and atraumatic  Cardiovascular: Normal rate, regular rhythm and normal heart sounds  Exam reveals no gallop and no friction rub  No murmur heard  Pulmonary/Chest: Effort normal and breath sounds normal  No respiratory distress  She has no wheezes  She has no rales  She exhibits no tenderness  Neurological: She is alert and oriented to person, place, and time  No cranial nerve deficit  Skin:   Foot exam:  Sensation intact bilaterally  Proprioception intact bilaterally  No loss of protective reflexes bilaterally  No wounds or lesions bilaterally

## 2018-06-14 DIAGNOSIS — F41.9 ANXIETY: ICD-10-CM

## 2018-06-14 RX ORDER — ALPRAZOLAM 0.5 MG/1
0.5 TABLET ORAL
Qty: 30 TABLET | Refills: 0 | Status: SHIPPED | OUTPATIENT
Start: 2018-06-14 | End: 2018-07-12 | Stop reason: SDUPTHER

## 2018-07-12 DIAGNOSIS — F41.9 ANXIETY: ICD-10-CM

## 2018-07-12 RX ORDER — ALPRAZOLAM 0.5 MG/1
0.5 TABLET ORAL
Qty: 30 TABLET | Refills: 0 | Status: SHIPPED | OUTPATIENT
Start: 2018-07-12 | End: 2018-08-13 | Stop reason: SDUPTHER

## 2018-08-02 DIAGNOSIS — E78.5 HYPERLIPIDEMIA, UNSPECIFIED HYPERLIPIDEMIA TYPE: Primary | ICD-10-CM

## 2018-08-02 RX ORDER — SIMVASTATIN 20 MG
20 TABLET ORAL
Qty: 90 TABLET | Refills: 0 | Status: SHIPPED | OUTPATIENT
Start: 2018-08-02 | End: 2018-11-09 | Stop reason: SDUPTHER

## 2018-08-03 ENCOUNTER — OFFICE VISIT (OUTPATIENT)
Dept: URGENT CARE | Facility: CLINIC | Age: 83
End: 2018-08-03
Payer: COMMERCIAL

## 2018-08-03 VITALS
HEIGHT: 64 IN | TEMPERATURE: 99.5 F | BODY MASS INDEX: 23.56 KG/M2 | SYSTOLIC BLOOD PRESSURE: 132 MMHG | DIASTOLIC BLOOD PRESSURE: 58 MMHG | WEIGHT: 138 LBS | HEART RATE: 97 BPM | OXYGEN SATURATION: 100 % | RESPIRATION RATE: 16 BRPM

## 2018-08-03 DIAGNOSIS — F32.A DEPRESSION, UNSPECIFIED DEPRESSION TYPE: Primary | ICD-10-CM

## 2018-08-03 DIAGNOSIS — S61.452A CAT BITE OF LEFT HAND, INITIAL ENCOUNTER: Primary | ICD-10-CM
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
## 2018-08-03 PROBLEM — R11.2 NAUSEA AND/OR VOMITING: Status: ACTIVE | Noted: 2017-09-18

## 2018-08-03 PROBLEM — L40.9 PSORIASIS: Status: ACTIVE | Noted: 2017-06-14

## 2018-08-03 PROCEDURE — 99213 OFFICE O/P EST LOW 20 MIN: CPT | Performed by: PHYSICIAN ASSISTANT

## 2018-08-03 RX ORDER — AMOXICILLIN AND CLAVULANATE POTASSIUM 875; 125 MG/1; MG/1
1 TABLET, FILM COATED ORAL EVERY 12 HOURS SCHEDULED
Qty: 20 TABLET | Refills: 0 | Status: SHIPPED | OUTPATIENT
Start: 2018-08-03 | End: 2018-08-13

## 2018-08-03 RX ORDER — FLUOXETINE HYDROCHLORIDE 20 MG/1
20 CAPSULE ORAL DAILY
Qty: 90 CAPSULE | Refills: 0 | Status: SHIPPED | OUTPATIENT
Start: 2018-08-03 | End: 2018-11-08 | Stop reason: SDUPTHER

## 2018-08-03 NOTE — PATIENT INSTRUCTIONS
Take antibiotic with food and water  While on this medication take a probiotic such as yogurt  Keep your hand clean using soap and water  Pat dry  You may apply antibiotic ointment to the area as desired  Keep the wound bandaged until scab formation  Monitor for signs of infection including increased redness, swelling, pus drainage, streaking redness, fevers, chills, and body aches  If these symptoms occur, seek care immediately  Follow up with your family doctor in 3-5 days  Proceed to the ER if symptoms worsen

## 2018-08-03 NOTE — PROGRESS NOTES
[facility]        NAME: Temo Muhammad is a [de-identified] y o  female  : 1935    MRN: [id number]  DATE: August 3, 2018  TIME: 5:19 PM    Assessment and Plan   Cat bite of left hand, initial encounter Ramya Sandyt  1  Cat bite of left hand, initial encounter  amoxicillin-clavulanate (AUGMENTIN) 875-125 mg per tablet         Patient Instructions   Take antibiotic with food and water  While on this medication take a probiotic such as yogurt  Keep your hand clean using soap and water  Pat dry  You may apply antibiotic ointment to the area as desired  Keep the wound bandaged until scab formation  Monitor for signs of infection including increased redness, swelling, pus drainage, streaking redness, fevers, chills, and body aches  If these symptoms occur, seek care immediately  Follow up with your family doctor in 3-5 days  Proceed to the ER if symptoms worsen  Chief Complaint     Chief Complaint   Patient presents with    Cat Bite     pt's cat bit her on the hand on side of thumb: Tetanus up to date     History of Present Illness       [de-identified] y/o female with c/o cat bite to right hand x today  The cat is her pet and is reportedly up to date on it's vaccines, including rabies  The patient is up to date on tetanus status  No previous cat bites  She did clean the wound thoroughly using soap and water, twice since injury, and has kept the wound bandaged  She is diabetic  Not taking any blood thinners  No treatments tried  Discomfort is minimal  No F/C, URI or GI sx--otherwise feeling well  The bite was reportedly provoked  Pt notes her cat is timid and hid within the lining of a chair when movers came to her house  The chair containing the cat was taken to a [employer] warehouse where, the cat remained overnight  Pt did retrieve the cat today noting it had not moved from the chair   She was able to pick the cat up out of the chair but as she tried to put it into it's carrying crate the cat attempted to flee  When she grabbed it's leg it bit her  She denies any other aggressive or abn  Behavior by the cat  Review of Systems   Review of Systems   Constitutional: Negative for chills, fatigue and fever  Musculoskeletal: Negative for arthralgias and myalgias  Skin: Negative for color change and rash  Neurological: Negative for weakness and numbness       Current Medications       Current Outpatient Prescriptions:     ALPRAZolam (XANAX) 0 5 mg tablet, Take 1 tablet (0 5 mg total) by mouth daily at bedtime as needed for anxiety for up to 30 days, Disp: 30 tablet, Rfl: 0    aluminum hydroxide-magnesium carbonate (GAVISCON)  mg/15 mL oral suspension, Take by mouth, Disp: , Rfl:     Ascorbic Acid (VITAMIN C) 100 MG tablet, Take 1 tablet by mouth daily, Disp: , Rfl:     Calcium Carbonate (CALTRATE 600) 1500 (600 Ca) MG TABS, Take 1 tablet by mouth daily, Disp: , Rfl:     dextromethorphan-guaifenesin (MUCINEX DM)  MG per 12 hr tablet, Take 1 tablet by mouth every 12 (twelve) hours, Disp: , Rfl:     diflorasone-emollient (APEXICON E) 0 05 % CREA, Apply topically 2 (two) times a day, Disp: , Rfl:     FLUoxetine (PROZAC) 20 mg capsule, Take 1 capsule (20 mg total) by mouth daily, Disp: 90 capsule, Rfl: 0    Glucosamine-Chondroit-Vit C-Mn (GLUCOSAMINE CHONDR 500 COMPLEX) CAPS, Take by mouth, Disp: , Rfl:     glucose blood (ONE TOUCH ULTRA TEST) test strip, by In Vitro route 2 (two) times a day, Disp: , Rfl:     guaiFENesin (MUCINEX) 600 mg 12 hr tablet, Take by mouth, Disp: , Rfl:     loperamide (IMODIUM A-D) 2 MG tablet, Take by mouth, Disp: , Rfl:     Loratadine 10 MG CAPS, Take 1 capsule by mouth daily, Disp: , Rfl:     metFORMIN (GLUCOPHAGE) 500 mg tablet, Take 1 tablet by mouth 2 (two) times a day  , Disp: , Rfl:     naphazoline-pheniramine (NAPHCON-A) 0 025-0 3 % ophthalmic solution, Apply to eye, Disp: , Rfl:     Omega-3 Fatty Acids (FISH OIL) 1200 MG CAPS, Take 1 capsule by mouth daily, Disp: , Rfl:     ondansetron (ZOFRAN) 4 mg tablet, Take by mouth 2 (two) times a day as needed, Disp: , Rfl:     ONE TOUCH LANCETS MISC, by Does not apply route, Disp: , Rfl:     ONETOUCH DELICA LANCETS 14L MISC, by Does not apply route, Disp: , Rfl:     RABEprazole (ACIPHEX) 20 MG tablet, Take by mouth daily, Disp: , Rfl:     simvastatin (ZOCOR) 20 mg tablet, Take 1 tablet (20 mg total) by mouth daily at bedtime, Disp: 90 tablet, Rfl: 0    Sulfacetamide Sodium-Sulfur 9 8-4 8 % CREA, Apply topically 2 (two) times a day, Disp: , Rfl:     sulfamethoxazole-trimethoprim (BACTRIM DS) 800-160 mg per tablet, Take 1 tablet by mouth 2 (two) times a day, Disp: , Rfl:     Turmeric (RA TURMERIC) 500 MG CAPS, Take 1 capsule by mouth daily, Disp: , Rfl:     amoxicillin-clavulanate (AUGMENTIN) 875-125 mg per tablet, Take 1 tablet by mouth every 12 (twelve) hours for 10 days, Disp: 20 tablet, Rfl: 0    Current Allergies     Allergies as of 08/03/2018 - Reviewed 08/03/2018   Allergen Reaction Noted    Lactase  10/29/2014    Sulfamethoxazole-trimethoprim  12/06/2017            The following portions of the patient's history were reviewed and updated as appropriate: allergies, current medications, past family history, past medical history, past social history, past surgical history and problem list      Past Medical History:   Diagnosis Date    Hypertension     Osteoarthritis        History reviewed  No pertinent surgical history  Family History   Problem Relation Age of Onset    Heart attack Father     Heart disease Sister     Diabetes Sister     Breast cancer Sister     Heart disease Brother          Medications have been verified  Objective   /58   Pulse 97   Temp 99 5 °F (37 5 °C)   Resp 16   Ht 5' 4" (1 626 m)   Wt 62 6 kg (138 lb)   SpO2 100%   Breastfeeding? No   BMI 23 69 kg/m²        Physical Exam     Physical Exam   Constitutional: She is oriented to person, place, and time  She appears well-developed and well-nourished  No distress  HENT:   Head: Normocephalic and atraumatic  Eyes: Conjunctivae are normal    Cardiovascular: Normal rate, regular rhythm and normal heart sounds  Pulmonary/Chest: Effort normal and breath sounds normal  No respiratory distress  She has no decreased breath sounds  She has no wheezes  She has no rhonchi  She has no rales  Neurological: She is alert and oriented to person, place, and time  No cranial nerve deficit  She exhibits normal muscle tone  Coordination normal    Skin: Skin is warm and dry  No rash noted  She is not diaphoretic  Single 4 mm superficial avulsion laceration of right thenar eminence  Surrounding skin appears clean and dry  No active bleeding or drainage  So signs of secondary infection  FROM of digits  Distal sensation intact  Psychiatric: She has a normal mood and affect  Her behavior is normal    Nursing note and vitals reviewed  Wound care: Wound cleansed using povidone iodine swabstick and bandaged

## 2018-08-07 ENCOUNTER — TELEPHONE (OUTPATIENT)
Dept: FAMILY MEDICINE CLINIC | Facility: CLINIC | Age: 83
End: 2018-08-07

## 2018-08-07 NOTE — TELEPHONE ENCOUNTER
She got discharged from [facility] today for cellulitis  She made asad appt with Dr Maria Victoria Ly for Monday 08/13/2018  Please call to get info on hosp stay    Thanks

## 2018-08-09 ENCOUNTER — TRANSITIONAL CARE MANAGEMENT (OUTPATIENT)
Dept: FAMILY MEDICINE CLINIC | Facility: CLINIC | Age: 83
End: 2018-08-09

## 2018-08-13 ENCOUNTER — OFFICE VISIT (OUTPATIENT)
Dept: FAMILY MEDICINE CLINIC | Facility: CLINIC | Age: 83
End: 2018-08-13
Payer: COMMERCIAL

## 2018-08-13 ENCOUNTER — TRANSITIONAL CARE MANAGEMENT (OUTPATIENT)
Dept: FAMILY MEDICINE CLINIC | Facility: CLINIC | Age: 83
End: 2018-08-13

## 2018-08-13 VITALS
RESPIRATION RATE: 16 BRPM | WEIGHT: 136 LBS | OXYGEN SATURATION: 97 % | DIASTOLIC BLOOD PRESSURE: 70 MMHG | HEART RATE: 84 BPM | HEIGHT: 64 IN | SYSTOLIC BLOOD PRESSURE: 128 MMHG | TEMPERATURE: 97.9 F | BODY MASS INDEX: 23.22 KG/M2

## 2018-08-13 DIAGNOSIS — L03.012 CELLULITIS OF FINGER OF LEFT HAND: ICD-10-CM
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
## 2018-08-13 DIAGNOSIS — F41.9 ANXIETY: ICD-10-CM

## 2018-08-13 PROCEDURE — 1036F TOBACCO NON-USER: CPT | Performed by: FAMILY MEDICINE

## 2018-08-13 PROCEDURE — 3008F BODY MASS INDEX DOCD: CPT | Performed by: FAMILY MEDICINE

## 2018-08-13 PROCEDURE — 99212 OFFICE O/P EST SF 10 MIN: CPT | Performed by: FAMILY MEDICINE

## 2018-08-13 RX ORDER — ALPRAZOLAM 0.5 MG/1
0.5 TABLET ORAL
Qty: 30 TABLET | Refills: 0 | Status: SHIPPED | OUTPATIENT
Start: 2018-08-13 | End: 2018-09-12 | Stop reason: SDUPTHER

## 2018-08-13 NOTE — PROGRESS NOTES
Assessment/Plan:  Cat bite  Cellulitis resolved  Complete course of antibiotics  Wash the wound with soap and water  Call with any questions or concerns  Follow-up 2 months for diabetes check  Call immediately for worsening symptoms  Subjective:     Patient ID: Rick Walton is a [de-identified] y o  female  This is an 62-year-old female presents after hospitalization for a cat bite with cellulitis  Review of Systems   Constitutional: Negative  Respiratory: Negative  Cardiovascular: Negative  Skin: Positive for wound  Objective:     Physical Exam   Constitutional: She appears well-developed and well-nourished  HENT:   Head: Normocephalic and atraumatic  Skin:   Cat bite on the thenar area of the left hand  No evidence of cellulitis at this point

## 2018-08-14 LAB
LEFT EYE DIABETIC RETINOPATHY: NORMAL
RIGHT EYE DIABETIC RETINOPATHY: NORMAL
SEVERITY (EYE EXAM): NORMAL

## 2018-08-21 DIAGNOSIS — E11.9 TYPE 2 DIABETES MELLITUS WITHOUT COMPLICATION, WITHOUT LONG-TERM CURRENT USE OF INSULIN (HCC): Primary | ICD-10-CM

## 2018-09-12 DIAGNOSIS — F41.9 ANXIETY: ICD-10-CM

## 2018-09-12 DIAGNOSIS — E11.9 TYPE 2 DIABETES MELLITUS WITHOUT COMPLICATION, WITHOUT LONG-TERM CURRENT USE OF INSULIN (HCC): Primary | ICD-10-CM

## 2018-09-12 RX ORDER — ALPRAZOLAM 0.5 MG/1
0.5 TABLET ORAL
Qty: 30 TABLET | Refills: 0 | Status: SHIPPED | OUTPATIENT
Start: 2018-09-12 | End: 2018-10-16 | Stop reason: SDUPTHER

## 2018-09-12 RX ORDER — LANCETS 33 GAUGE
EACH MISCELLANEOUS DAILY
Qty: 90 EACH | Refills: 3 | Status: SHIPPED | OUTPATIENT
Start: 2018-09-12 | End: 2018-12-11 | Stop reason: ALTCHOICE

## 2018-10-16 ENCOUNTER — OFFICE VISIT (OUTPATIENT)
Dept: FAMILY MEDICINE CLINIC | Facility: CLINIC | Age: 83
End: 2018-10-16
Payer: COMMERCIAL

## 2018-10-16 VITALS
BODY MASS INDEX: 23.56 KG/M2 | DIASTOLIC BLOOD PRESSURE: 62 MMHG | TEMPERATURE: 97.2 F | SYSTOLIC BLOOD PRESSURE: 100 MMHG | RESPIRATION RATE: 20 BRPM | HEIGHT: 64 IN | WEIGHT: 138 LBS | HEART RATE: 70 BPM

## 2018-10-16 DIAGNOSIS — E11.9 TYPE 2 DIABETES MELLITUS WITHOUT COMPLICATION, WITHOUT LONG-TERM CURRENT USE OF INSULIN (HCC): ICD-10-CM

## 2018-10-16 DIAGNOSIS — Z23 NEED FOR INFLUENZA VACCINATION: ICD-10-CM

## 2018-10-16 DIAGNOSIS — F41.9 ANXIETY: ICD-10-CM

## 2018-10-16 DIAGNOSIS — Z23 IMMUNIZATION DUE: ICD-10-CM

## 2018-10-16 LAB — SL AMB POCT HEMOGLOBIN AIC: 5.6

## 2018-10-16 PROCEDURE — G0008 ADMIN INFLUENZA VIRUS VAC: HCPCS

## 2018-10-16 PROCEDURE — 4040F PNEUMOC VAC/ADMIN/RCVD: CPT

## 2018-10-16 PROCEDURE — 83036 HEMOGLOBIN GLYCOSYLATED A1C: CPT | Performed by: FAMILY MEDICINE

## 2018-10-16 PROCEDURE — 3008F BODY MASS INDEX DOCD: CPT | Performed by: FAMILY MEDICINE

## 2018-10-16 PROCEDURE — 99213 OFFICE O/P EST LOW 20 MIN: CPT | Performed by: FAMILY MEDICINE

## 2018-10-16 PROCEDURE — 90662 IIV NO PRSV INCREASED AG IM: CPT

## 2018-10-16 RX ORDER — ALPRAZOLAM 0.5 MG/1
0.5 TABLET ORAL
Qty: 30 TABLET | Refills: 0 | Status: SHIPPED | OUTPATIENT
Start: 2018-10-16 | End: 2018-11-12 | Stop reason: SDUPTHER

## 2018-10-16 NOTE — PROGRESS NOTES
Assessment/plan        High-dose flu given today  A1c done today  Ophthalmological exam is complete  Mediterranean diet  Walking for exercise  Follow-up 3 months  Call with any questions or concerns  Diabetic foot exam:   Left: Reflexes 2+    Vibratory sensation normal   Proprioception normal   Sharp/dull discrimination normal   Filament test present  Right: Reflexes 2+    Vibratory sensation normal   Proprioception normal   Sharp/dull discrimination normal   Filament test present    Patient's shoes and socks removed  Right Foot/Ankle   Right Foot Inspection  Skin Exam: skin normal and skin intact no dry skin, no warmth, no callus, no erythema, no maceration, no abnormal color, no pre-ulcer, no ulcer and no callus                            Sensory   Vibration: intact  Proprioception: intact   Monofilament testing: intact  Vascular  Capillary refills: < 3 seconds  The right DP pulse is 2+  The right PT pulse is 2+  Left Foot/Ankle  Left Foot Inspection  Skin Exam: skin normal and skin intactno dry skin, no warmth, no erythema, no maceration, normal color, no pre-ulcer, no ulcer and no callus                                         Sensory   Vibration: intact  Proprioception: intact  Monofilament: intact  Vascular  Capillary refills: < 3 seconds  The left DP pulse is 2+  The left PT pulse is 2+  Assign Risk Category:  No deformity present; No loss of protective sensation; No weak pulses       Risk: 0       Diagnoses and all orders for this visit:    Immunization due  -     influenza vaccine, 5195-7262, high-dose, PF 0 5 mL, for patients 65 yr+ (FLUZONE HIGH-DOSE)    Anxiety  -     ALPRAZolam (XANAX) 0 5 mg tablet;  Take 1 tablet (0 5 mg total) by mouth daily at bedtime as needed for anxiety for up to 30 days    Type 2 diabetes mellitus without complication, without long-term current use of insulin ([facility])  -     POCT hemoglobin A1c    Need for influenza vaccination          Subjective:     Patient ID: Charlotte Thomas is a 82 y o  female  This is an 51-year-old female who is following up for type 2 diabetes uncomplicated  Review of Systems   Constitutional: Negative  Respiratory: Negative  Cardiovascular: Negative  Neurological: Negative  Objective:     Physical Exam   Constitutional: She is oriented to person, place, and time  She appears well-developed and well-nourished  HENT:   Head: Normocephalic and atraumatic  Cardiovascular: Normal rate, regular rhythm and normal heart sounds  Exam reveals no gallop and no friction rub  Pulses are no weak pulses  No murmur heard  Pulses:       Dorsalis pedis pulses are 2+ on the right side, and 2+ on the left side  Posterior tibial pulses are 2+ on the right side, and 2+ on the left side  Pulmonary/Chest: Effort normal and breath sounds normal  No respiratory distress  She has no wheezes  She has no rales  She exhibits no tenderness  Feet:   Right Foot:   Skin Integrity: Negative for ulcer, skin breakdown, erythema, warmth, callus or dry skin  Left Foot:   Skin Integrity: Negative for ulcer, skin breakdown, erythema, warmth, callus or dry skin  Neurological: She is alert and oriented to person, place, and time  No cranial nerve deficit   Coordination normal

## 2018-11-08 DIAGNOSIS — F32.A DEPRESSION, UNSPECIFIED DEPRESSION TYPE: ICD-10-CM

## 2018-11-08 RX ORDER — FLUOXETINE HYDROCHLORIDE 20 MG/1
20 CAPSULE ORAL DAILY
Qty: 90 CAPSULE | Refills: 0 | Status: SHIPPED | OUTPATIENT
Start: 2018-11-08 | End: 2019-02-11 | Stop reason: SDUPTHER

## 2018-11-09 DIAGNOSIS — E78.5 HYPERLIPIDEMIA, UNSPECIFIED HYPERLIPIDEMIA TYPE: ICD-10-CM

## 2018-11-12 DIAGNOSIS — F41.9 ANXIETY: ICD-10-CM

## 2018-11-12 RX ORDER — ALPRAZOLAM 0.5 MG/1
0.5 TABLET ORAL
Qty: 30 TABLET | Refills: 0 | Status: SHIPPED | OUTPATIENT
Start: 2018-11-12 | End: 2018-12-12 | Stop reason: SDUPTHER

## 2018-11-13 RX ORDER — SIMVASTATIN 20 MG
TABLET ORAL
Qty: 90 TABLET | Refills: 0 | Status: SHIPPED | OUTPATIENT
Start: 2018-11-13 | End: 2019-01-28 | Stop reason: SDUPTHER

## 2018-12-12 DIAGNOSIS — F41.9 ANXIETY: ICD-10-CM

## 2018-12-12 RX ORDER — ALPRAZOLAM 0.5 MG/1
0.5 TABLET ORAL
Qty: 30 TABLET | Refills: 0 | Status: SHIPPED | OUTPATIENT
Start: 2018-12-12 | End: 2019-01-09 | Stop reason: SDUPTHER

## 2018-12-21 ENCOUNTER — TELEPHONE (OUTPATIENT)
Dept: FAMILY MEDICINE CLINIC | Facility: CLINIC | Age: 83
End: 2018-12-21

## 2018-12-21 NOTE — TELEPHONE ENCOUNTER
Pt has been taking metformin and testing for DM2  Since her A1c was 5 6 the last visit she is wondering if she needs to be taking the metformin still  Please Advise

## 2019-01-09 DIAGNOSIS — F41.9 ANXIETY: ICD-10-CM

## 2019-01-09 RX ORDER — ALPRAZOLAM 0.5 MG/1
0.5 TABLET ORAL
Qty: 30 TABLET | Refills: 0 | Status: SHIPPED | OUTPATIENT
Start: 2019-01-09 | End: 2019-02-11 | Stop reason: SDUPTHER

## 2019-01-16 ENCOUNTER — OFFICE VISIT (OUTPATIENT)
Dept: FAMILY MEDICINE CLINIC | Facility: CLINIC | Age: 84
End: 2019-01-16
Payer: COMMERCIAL

## 2019-01-16 VITALS
WEIGHT: 141 LBS | BODY MASS INDEX: 24.07 KG/M2 | TEMPERATURE: 97.4 F | RESPIRATION RATE: 12 BRPM | SYSTOLIC BLOOD PRESSURE: 110 MMHG | HEIGHT: 64 IN | OXYGEN SATURATION: 97 % | HEART RATE: 86 BPM | DIASTOLIC BLOOD PRESSURE: 62 MMHG

## 2019-01-16 DIAGNOSIS — E11.9 TYPE 2 DIABETES MELLITUS WITHOUT COMPLICATION, WITHOUT LONG-TERM CURRENT USE OF INSULIN (HCC): Primary | ICD-10-CM

## 2019-01-16 LAB
CREAT UR-MCNC: 42.5 MG/DL
MICROALBUMIN UR-MCNC: 6.2 MG/L (ref 0–20)
MICROALBUMIN/CREAT 24H UR: 15 MG/G CREATININE (ref 0–30)
SL AMB POCT HEMOGLOBIN AIC: 5.8 (ref ?–6.5)

## 2019-01-16 PROCEDURE — 82043 UR ALBUMIN QUANTITATIVE: CPT | Performed by: FAMILY MEDICINE

## 2019-01-16 PROCEDURE — 1160F RVW MEDS BY RX/DR IN RCRD: CPT | Performed by: FAMILY MEDICINE

## 2019-01-16 PROCEDURE — 99213 OFFICE O/P EST LOW 20 MIN: CPT | Performed by: FAMILY MEDICINE

## 2019-01-16 PROCEDURE — 1036F TOBACCO NON-USER: CPT | Performed by: FAMILY MEDICINE

## 2019-01-16 PROCEDURE — 83036 HEMOGLOBIN GLYCOSYLATED A1C: CPT | Performed by: FAMILY MEDICINE

## 2019-01-16 PROCEDURE — 82570 ASSAY OF URINE CREATININE: CPT | Performed by: FAMILY MEDICINE

## 2019-01-16 NOTE — PROGRESS NOTES
Assessment/Plan:      Diagnoses and all orders for this visit:    Type 2 diabetes mellitus without complication, without long-term current use of insulin ([facility] [address] )  -     Microalbumin,Urine          Subjective:     Patient ID: Cooper Stover is a 82 y o  female  This is an 43-year-old female who presents for follow-up of type 2 diabetes mellitus which is uncomplicated  Review of Systems   Constitutional: Negative  Respiratory: Negative  Cardiovascular: Negative  Neurological: Negative  Objective:    A1c 5 8  Physical Exam   Constitutional: She is oriented to person, place, and time  She appears well-developed and well-nourished  HENT:   Head: Normocephalic and atraumatic  Cardiovascular: Normal rate, regular rhythm and normal heart sounds  Exam reveals no gallop and no friction rub  No murmur heard  Pulmonary/Chest: Effort normal and breath sounds normal  No respiratory distress  She has no wheezes  She has no rales  She exhibits no tenderness  Neurological: She is alert and oriented to person, place, and time  No cranial nerve deficit   Coordination normal

## 2019-01-28 DIAGNOSIS — E78.5 HYPERLIPIDEMIA, UNSPECIFIED HYPERLIPIDEMIA TYPE: ICD-10-CM

## 2019-01-29 RX ORDER — SIMVASTATIN 20 MG
TABLET ORAL
Qty: 90 TABLET | Refills: 1 | Status: SHIPPED | OUTPATIENT
Start: 2019-01-29 | End: 2019-10-04 | Stop reason: SDUPTHER

## 2019-02-11 DIAGNOSIS — F32.A DEPRESSION, UNSPECIFIED DEPRESSION TYPE: ICD-10-CM

## 2019-02-11 DIAGNOSIS — F41.9 ANXIETY: ICD-10-CM

## 2019-02-11 RX ORDER — ALPRAZOLAM 0.5 MG/1
0.5 TABLET ORAL
Qty: 30 TABLET | Refills: 0 | Status: SHIPPED | OUTPATIENT
Start: 2019-02-11 | End: 2019-03-13 | Stop reason: SDUPTHER

## 2019-02-11 RX ORDER — FLUOXETINE HYDROCHLORIDE 20 MG/1
20 CAPSULE ORAL DAILY
Qty: 90 CAPSULE | Refills: 0 | Status: SHIPPED | OUTPATIENT
Start: 2019-02-11 | End: 2019-05-13 | Stop reason: SDUPTHER

## 2019-03-13 DIAGNOSIS — F41.9 ANXIETY: ICD-10-CM

## 2019-03-13 RX ORDER — ALPRAZOLAM 0.5 MG/1
0.5 TABLET ORAL
Qty: 30 TABLET | Refills: 0 | Status: SHIPPED | OUTPATIENT
Start: 2019-03-13 | End: 2019-04-11 | Stop reason: SDUPTHER

## 2019-04-11 DIAGNOSIS — F41.9 ANXIETY: ICD-10-CM

## 2019-04-11 RX ORDER — ALPRAZOLAM 0.5 MG/1
0.5 TABLET ORAL
Qty: 30 TABLET | Refills: 0 | Status: SHIPPED | OUTPATIENT
Start: 2019-04-11 | End: 2019-05-13 | Stop reason: SDUPTHER

## 2019-04-17 ENCOUNTER — OFFICE VISIT (OUTPATIENT)
Dept: FAMILY MEDICINE CLINIC | Facility: CLINIC | Age: 84
End: 2019-04-17
Payer: COMMERCIAL

## 2019-04-17 VITALS
RESPIRATION RATE: 14 BRPM | WEIGHT: 147 LBS | HEIGHT: 64 IN | HEART RATE: 68 BPM | OXYGEN SATURATION: 98 % | TEMPERATURE: 97.9 F | SYSTOLIC BLOOD PRESSURE: 130 MMHG | DIASTOLIC BLOOD PRESSURE: 50 MMHG | BODY MASS INDEX: 25.1 KG/M2

## 2019-04-17 DIAGNOSIS — I20.0 UNSTABLE ANGINA (HCC): ICD-10-CM

## 2019-04-17 DIAGNOSIS — Z00.00 ENCOUNTER FOR MEDICARE ANNUAL WELLNESS EXAM: ICD-10-CM

## 2019-04-17 PROCEDURE — 93005 ELECTROCARDIOGRAM TRACING: CPT | Performed by: FAMILY MEDICINE

## 2019-04-17 PROCEDURE — G0439 PPPS, SUBSEQ VISIT: HCPCS | Performed by: FAMILY MEDICINE

## 2019-04-17 PROCEDURE — 1125F AMNT PAIN NOTED PAIN PRSNT: CPT | Performed by: FAMILY MEDICINE

## 2019-04-17 PROCEDURE — 1170F FXNL STATUS ASSESSED: CPT | Performed by: FAMILY MEDICINE

## 2019-04-17 PROCEDURE — 99214 OFFICE O/P EST MOD 30 MIN: CPT | Performed by: FAMILY MEDICINE

## 2019-04-17 RX ORDER — RANITIDINE 150 MG/1
150 TABLET ORAL EVERY MORNING
COMMUNITY
End: 2020-11-11 | Stop reason: ALTCHOICE

## 2019-05-13 DIAGNOSIS — F41.9 ANXIETY: ICD-10-CM

## 2019-05-13 DIAGNOSIS — F32.A DEPRESSION, UNSPECIFIED DEPRESSION TYPE: ICD-10-CM

## 2019-05-13 RX ORDER — FLUOXETINE HYDROCHLORIDE 20 MG/1
20 CAPSULE ORAL DAILY
Qty: 90 CAPSULE | Refills: 0 | Status: SHIPPED | OUTPATIENT
Start: 2019-05-13 | End: 2019-08-12 | Stop reason: SDUPTHER

## 2019-05-13 RX ORDER — ALPRAZOLAM 0.5 MG/1
0.5 TABLET ORAL
Qty: 30 TABLET | Refills: 0 | Status: SHIPPED | OUTPATIENT
Start: 2019-05-13 | End: 2019-06-11 | Stop reason: SDUPTHER

## 2019-06-11 DIAGNOSIS — F41.9 ANXIETY: ICD-10-CM

## 2019-06-11 RX ORDER — ALPRAZOLAM 0.5 MG/1
0.5 TABLET ORAL
Qty: 30 TABLET | Refills: 0 | Status: SHIPPED | OUTPATIENT
Start: 2019-06-11 | End: 2019-07-11 | Stop reason: SDUPTHER

## 2019-06-29 ENCOUNTER — OFFICE VISIT (OUTPATIENT)
Dept: URGENT CARE | Facility: CLINIC | Age: 84
End: 2019-06-29
Payer: COMMERCIAL

## 2019-06-29 VITALS
SYSTOLIC BLOOD PRESSURE: 100 MMHG | HEIGHT: 64 IN | HEART RATE: 77 BPM | RESPIRATION RATE: 16 BRPM | WEIGHT: 150 LBS | TEMPERATURE: 98.4 F | OXYGEN SATURATION: 100 % | BODY MASS INDEX: 25.61 KG/M2 | DIASTOLIC BLOOD PRESSURE: 72 MMHG

## 2019-06-29 DIAGNOSIS — R10.32 LLQ ABDOMINAL PAIN: Primary | ICD-10-CM

## 2019-06-29 PROCEDURE — 99213 OFFICE O/P EST LOW 20 MIN: CPT | Performed by: PHYSICIAN ASSISTANT

## 2019-07-11 DIAGNOSIS — F41.9 ANXIETY: ICD-10-CM

## 2019-07-11 RX ORDER — ALPRAZOLAM 0.5 MG/1
0.5 TABLET ORAL
Qty: 30 TABLET | Refills: 0 | Status: SHIPPED | OUTPATIENT
Start: 2019-07-11 | End: 2019-08-12 | Stop reason: SDUPTHER

## 2019-08-12 DIAGNOSIS — F41.9 ANXIETY: ICD-10-CM

## 2019-08-12 DIAGNOSIS — F32.A DEPRESSION, UNSPECIFIED DEPRESSION TYPE: ICD-10-CM

## 2019-08-12 RX ORDER — FLUOXETINE HYDROCHLORIDE 20 MG/1
20 CAPSULE ORAL DAILY
Qty: 90 CAPSULE | Refills: 0 | Status: SHIPPED | OUTPATIENT
Start: 2019-08-12 | End: 2019-11-25 | Stop reason: SDUPTHER

## 2019-08-12 RX ORDER — ALPRAZOLAM 0.5 MG/1
0.5 TABLET ORAL
Qty: 30 TABLET | Refills: 0 | Status: SHIPPED | OUTPATIENT
Start: 2019-08-12 | End: 2019-09-10 | Stop reason: SDUPTHER

## 2019-08-27 ENCOUNTER — OFFICE VISIT (OUTPATIENT)
Dept: FAMILY MEDICINE CLINIC | Facility: CLINIC | Age: 84
End: 2019-08-27
Payer: COMMERCIAL

## 2019-08-27 VITALS
HEIGHT: 64 IN | HEART RATE: 80 BPM | BODY MASS INDEX: 25.23 KG/M2 | DIASTOLIC BLOOD PRESSURE: 64 MMHG | TEMPERATURE: 98.1 F | OXYGEN SATURATION: 96 % | WEIGHT: 147.8 LBS | SYSTOLIC BLOOD PRESSURE: 132 MMHG

## 2019-08-27 DIAGNOSIS — F43.22 ADJUSTMENT REACTION WITH ANXIETY: ICD-10-CM

## 2019-08-27 DIAGNOSIS — K57.90 DIVERTICULOSIS: ICD-10-CM

## 2019-08-27 DIAGNOSIS — L40.9 PSORIASIS: ICD-10-CM

## 2019-08-27 DIAGNOSIS — E11.9 TYPE 2 DIABETES MELLITUS WITHOUT COMPLICATION, WITHOUT LONG-TERM CURRENT USE OF INSULIN (HCC): Primary | ICD-10-CM

## 2019-08-27 DIAGNOSIS — E78.01 FAMILIAL HYPERCHOLESTEROLEMIA: ICD-10-CM

## 2019-08-27 PROBLEM — R11.2 NAUSEA AND/OR VOMITING: Status: RESOLVED | Noted: 2017-09-18 | Resolved: 2019-08-27

## 2019-08-27 PROBLEM — R10.32 LLQ ABDOMINAL PAIN: Status: RESOLVED | Noted: 2019-06-29 | Resolved: 2019-08-27

## 2019-08-27 PROCEDURE — 1036F TOBACCO NON-USER: CPT | Performed by: FAMILY MEDICINE

## 2019-08-27 PROCEDURE — 1160F RVW MEDS BY RX/DR IN RCRD: CPT | Performed by: FAMILY MEDICINE

## 2019-08-27 PROCEDURE — 36415 COLL VENOUS BLD VENIPUNCTURE: CPT | Performed by: FAMILY MEDICINE

## 2019-08-27 PROCEDURE — 99214 OFFICE O/P EST MOD 30 MIN: CPT | Performed by: FAMILY MEDICINE

## 2019-08-27 NOTE — PROGRESS NOTES
Assessment/Plan:    Problem List Items Addressed This Visit        Digestive    Diverticulosis     STABLE            Endocrine    Type 2 diabetes mellitus without complication, without long-term current use of insulin ([facility] [address] ) - Primary     Lab Results   Component Value Date    HGBA1C 5 8 01/16/2019       No results for input(s): POCGLU in the last 72 hours  Blood Sugar Average: Last 72 hrs:       RECENT HGB A1C NL  D/C MEDICATIONS  DENIES ANY NUMBNESS, TINGLING IN FEET    - DISCUSSED DIETARY MODIFICATION OF CARBOHYDRATES  - HGB A1C AND URINE STUDIES DUE TODAY  - FOOT CARE  - RV 3 MONTHS             Relevant Orders    Comprehensive metabolic panel    Hemoglobin A1c (w/out EAG)    TSH, 3rd generation       Musculoskeletal and Integument    Psoriasis     STABLE         Relevant Orders    TSH, 3rd generation       Other    Adjustment reaction with anxiety    Relevant Orders    TSH, 3rd generation    Hyperlipidemia     WATCHING CHOLESTEROL INTAKE  COMPLIANT WITH MEDICATION  NO CONCERNS    - CONTINUE TO MONITOR DIETARY CHOL INTAKE  - CONTINUE CURRENT MEDICATION  - ENCOURAGED PHYSICAL ACTIVITY             Relevant Orders    Comprehensive metabolic panel    Lipid panel    TSH, 3rd generation          BMI Counseling: Body mass index is 25 37 kg/m²  Discussed the patient's BMI with her  The BMI is above average  BMI counseling and education was provided to the patient  Nutrition recommendations include decreasing overall calorie intake  Patient Instructions   CONTINUE CURRENT TREATMENT PLAN  MONITOR DIETARY SODIUM, CHOL, AND CARBOHYDRATE INTAKE  ENCOURAGE PHYSICAL ACTIVITY  FOOT CARE    RV 3 M, SOONER PRN        Return in about 3 months (around 11/27/2019) for Recheck  Subjective:      Patient ID: Mart Wiggins is a 82 y o  female      Chief Complaint   Patient presents with    Medication Management       PATIENT RETURNS FOR ROUTINE EVALUATION OF PATIENT'S MEDICAL ISSUES    INDIVIDUAL MEDICAL ISSUES WITH THEIR CURRENT STATUS, ASSESSMENT AND PLANS ARE LISTED ABOVE          The following portions of the patient's history were reviewed and updated as appropriate: allergies, current medications, past family history, past medical history, past social history, past surgical history and problem list     Review of Systems   Constitutional: Negative for chills, fatigue and fever  HENT: Negative for congestion, ear discharge, ear pain, mouth sores, postnasal drip, sore throat and trouble swallowing  Eyes: Negative for pain, discharge and visual disturbance  Respiratory: Negative for cough, shortness of breath and wheezing  Cardiovascular: Negative for chest pain, palpitations and leg swelling  Gastrointestinal: Negative for abdominal distention, abdominal pain, blood in stool, diarrhea and nausea  Endocrine: Negative for polydipsia, polyphagia and polyuria  Genitourinary: Negative for dysuria, frequency, hematuria and urgency  Musculoskeletal: Negative for arthralgias, gait problem and joint swelling  Skin: Negative for pallor and rash  Neurological: Negative for dizziness, syncope, speech difficulty, weakness, light-headedness, numbness and headaches  Hematological: Negative for adenopathy  Psychiatric/Behavioral: Negative for behavioral problems, confusion and sleep disturbance  The patient is not nervous/anxious            Current Outpatient Medications   Medication Sig Dispense Refill    ALPRAZolam (XANAX) 0 5 mg tablet Take 1 tablet (0 5 mg total) by mouth daily at bedtime as needed for anxiety for up to 30 days 30 tablet 0    aluminum hydroxide-magnesium carbonate (GAVISCON)  mg/15 mL oral suspension Take by mouth      FLUoxetine (PROZAC) 20 mg capsule Take 1 capsule (20 mg total) by mouth daily 90 capsule 0    Glucosamine-Chondroit-Vit C-Mn (GLUCOSAMINE CHONDR 500 COMPLEX) CAPS Take by mouth      Lactobacillus (ACIDOPHILUS PO) Take by mouth daily      Loratadine 10 MG CAPS Take 1 capsule by mouth daily      Multiple Vitamins-Minerals (CENTRUM SILVER 50+WOMEN PO) Take by mouth daily      naphazoline-pheniramine (NAPHCON-A) 0 025-0 3 % ophthalmic solution Apply to eye      Omega-3 Fatty Acids (FISH OIL) 1200 MG CAPS Take 1 capsule by mouth daily      ranitidine (ZANTAC) 150 mg tablet Take 150 mg by mouth every morning      simvastatin (ZOCOR) 20 mg tablet TAKE 1 TABLET BY MOUTH  DAILY AT BEDTIME 90 tablet 1    Sulfacetamide Sodium-Sulfur 9 8-4 8 % CREA Apply topically 2 (two) times a day       Turmeric (RA TURMERIC) 500 MG CAPS Take 1 capsule by mouth daily      Ascorbic Acid (VITAMIN C) 100 MG tablet Take 1 tablet by mouth daily      Cranberry-Vitamin C-Probiotic (AZO CRANBERRY PO) Take by mouth      glucose blood (ONE TOUCH ULTRA TEST) test strip 1 each by Other route 2 (two) times a day (Patient not taking: Reported on 4/17/2019) 100 each 3    metFORMIN (GLUCOPHAGE) 500 mg tablet Take 1 tablet by mouth 2 (two) times a day        ONE TOUCH LANCETS MISC by Does not apply route       No current facility-administered medications for this visit  Objective:    /64 (BP Location: Left arm, Patient Position: Sitting, Cuff Size: Standard)   Pulse 80   Temp 98 1 °F (36 7 °C) (Temporal)   Ht 5' 4" (1 626 m)   Wt 67 kg (147 lb 12 8 oz)   SpO2 96%   BMI 25 37 kg/m²        Physical Exam   Constitutional: She is oriented to person, place, and time  She appears well-developed and well-nourished  HENT:   Head: Normocephalic and atraumatic  Eyes: Pupils are equal, round, and reactive to light  Conjunctivae and EOM are normal  Right eye exhibits no discharge  Left eye exhibits no discharge  Neck: Normal range of motion  Neck supple  No thyromegaly present  Cardiovascular: Normal rate, regular rhythm and normal heart sounds  No murmur heard  Pulmonary/Chest: Effort normal and breath sounds normal  No respiratory distress  She has no wheezes  She has no rales  Abdominal: Soft  Bowel sounds are normal  There is no tenderness  Musculoskeletal: Normal range of motion  She exhibits no edema or tenderness  MODERATE DJD CHANGES   Lymphadenopathy:     She has no cervical adenopathy  Neurological: She is alert and oriented to person, place, and time  Skin: Skin is warm and dry  No rash noted  No erythema  Psychiatric: She has a normal mood and affect   Her behavior is normal  Judgment and thought content normal               Kiel Camargo MD

## 2019-08-27 NOTE — ASSESSMENT & PLAN NOTE
Lab Results   Component Value Date    HGBA1C 5 8 01/16/2019       No results for input(s): POCGLU in the last 72 hours      Blood Sugar Average: Last 72 hrs:       RECENT HGB A1C NL  D/C MEDICATIONS  DENIES ANY NUMBNESS, TINGLING IN FEET    - DISCUSSED DIETARY MODIFICATION OF CARBOHYDRATES  - HGB A1C AND URINE STUDIES DUE TODAY  - FOOT CARE  - RV 3 MONTHS

## 2019-08-28 LAB
ALBUMIN SERPL-MCNC: 4.3 G/DL (ref 3.6–5.1)
ALBUMIN/GLOB SERPL: 1.9 (CALC) (ref 1–2.5)
ALP SERPL-CCNC: 46 U/L (ref 33–130)
ALT SERPL-CCNC: 21 U/L (ref 6–29)
AST SERPL-CCNC: 21 U/L (ref 10–35)
BILIRUB SERPL-MCNC: 0.4 MG/DL (ref 0.2–1.2)
BUN SERPL-MCNC: 15 MG/DL (ref 7–25)
BUN/CREAT SERPL: 15 (CALC) (ref 6–22)
CALCIUM SERPL-MCNC: 10.1 MG/DL (ref 8.6–10.4)
CHLORIDE SERPL-SCNC: 103 MMOL/L (ref 98–110)
CHOLEST SERPL-MCNC: 198 MG/DL
CHOLEST/HDLC SERPL: 3.7 (CALC)
CO2 SERPL-SCNC: 28 MMOL/L (ref 20–32)
CREAT SERPL-MCNC: 0.97 MG/DL (ref 0.6–0.88)
GLOBULIN SER CALC-MCNC: 2.3 G/DL (CALC) (ref 1.9–3.7)
GLUCOSE SERPL-MCNC: 115 MG/DL (ref 65–99)
HBA1C MFR BLD: 6.2 % OF TOTAL HGB
HDLC SERPL-MCNC: 54 MG/DL
LDLC SERPL CALC-MCNC: 108 MG/DL (CALC)
NONHDLC SERPL-MCNC: 144 MG/DL (CALC)
POTASSIUM SERPL-SCNC: 4.8 MMOL/L (ref 3.5–5.3)
PROT SERPL-MCNC: 6.6 G/DL (ref 6.1–8.1)
SL AMB EGFR AFRICAN AMERICAN: 63 ML/MIN/1.73M2
SL AMB EGFR NON AFRICAN AMERICAN: 55 ML/MIN/1.73M2
SODIUM SERPL-SCNC: 138 MMOL/L (ref 135–146)
TRIGL SERPL-MCNC: 235 MG/DL
TSH SERPL-ACNC: 2.69 MIU/L (ref 0.4–4.5)

## 2019-08-29 LAB
LEFT EYE DIABETIC RETINOPATHY: NORMAL
RIGHT EYE DIABETIC RETINOPATHY: NORMAL

## 2019-09-10 DIAGNOSIS — F41.9 ANXIETY: ICD-10-CM

## 2019-09-10 RX ORDER — ALPRAZOLAM 0.5 MG/1
0.5 TABLET ORAL
Qty: 30 TABLET | Refills: 0 | Status: SHIPPED | OUTPATIENT
Start: 2019-09-10 | End: 2019-10-10 | Stop reason: SDUPTHER

## 2019-10-04 DIAGNOSIS — E78.5 HYPERLIPIDEMIA, UNSPECIFIED HYPERLIPIDEMIA TYPE: ICD-10-CM

## 2019-10-04 RX ORDER — SIMVASTATIN 20 MG
20 TABLET ORAL
Qty: 90 TABLET | Refills: 3 | Status: SHIPPED | OUTPATIENT
Start: 2019-10-04 | End: 2020-08-28

## 2019-10-10 DIAGNOSIS — F41.9 ANXIETY: ICD-10-CM

## 2019-10-10 RX ORDER — ALPRAZOLAM 0.5 MG/1
0.5 TABLET ORAL
Qty: 30 TABLET | Refills: 0 | Status: SHIPPED | OUTPATIENT
Start: 2019-10-10 | End: 2019-11-07 | Stop reason: SDUPTHER

## 2019-11-07 DIAGNOSIS — F41.9 ANXIETY: ICD-10-CM

## 2019-11-08 RX ORDER — ALPRAZOLAM 0.5 MG/1
0.5 TABLET ORAL
Qty: 30 TABLET | Refills: 0 | Status: SHIPPED | OUTPATIENT
Start: 2019-11-08 | End: 2019-12-09 | Stop reason: SDUPTHER

## 2019-11-25 DIAGNOSIS — F32.A DEPRESSION, UNSPECIFIED DEPRESSION TYPE: ICD-10-CM

## 2019-11-25 RX ORDER — FLUOXETINE HYDROCHLORIDE 20 MG/1
20 CAPSULE ORAL DAILY
Qty: 90 CAPSULE | Refills: 0 | Status: SHIPPED | OUTPATIENT
Start: 2019-11-25 | End: 2020-02-19 | Stop reason: SDUPTHER

## 2019-12-09 DIAGNOSIS — F41.9 ANXIETY: ICD-10-CM

## 2019-12-09 RX ORDER — ALPRAZOLAM 0.5 MG/1
0.5 TABLET ORAL
Qty: 30 TABLET | Refills: 0 | Status: SHIPPED | OUTPATIENT
Start: 2019-12-09 | End: 2020-01-06 | Stop reason: SDUPTHER

## 2019-12-19 RX ORDER — TOBRAMYCIN 3 MG/ML
SOLUTION/ DROPS OPHTHALMIC
COMMUNITY
Start: 2019-09-23 | End: 2020-11-11 | Stop reason: ALTCHOICE

## 2019-12-23 ENCOUNTER — OFFICE VISIT (OUTPATIENT)
Dept: FAMILY MEDICINE CLINIC | Facility: CLINIC | Age: 84
End: 2019-12-23
Payer: COMMERCIAL

## 2019-12-23 VITALS
BODY MASS INDEX: 25.56 KG/M2 | DIASTOLIC BLOOD PRESSURE: 50 MMHG | HEART RATE: 85 BPM | OXYGEN SATURATION: 96 % | SYSTOLIC BLOOD PRESSURE: 120 MMHG | WEIGHT: 153.4 LBS | RESPIRATION RATE: 16 BRPM | HEIGHT: 65 IN | TEMPERATURE: 98.5 F

## 2019-12-23 DIAGNOSIS — K57.90 DIVERTICULOSIS: ICD-10-CM

## 2019-12-23 DIAGNOSIS — E11.9 TYPE 2 DIABETES MELLITUS WITHOUT COMPLICATION, WITHOUT LONG-TERM CURRENT USE OF INSULIN (HCC): Primary | ICD-10-CM

## 2019-12-23 DIAGNOSIS — E78.01 FAMILIAL HYPERCHOLESTEROLEMIA: ICD-10-CM

## 2019-12-23 DIAGNOSIS — R26.89 BALANCE DISORDER: Primary | ICD-10-CM

## 2019-12-23 DIAGNOSIS — R29.898 WEAKNESS OF RIGHT LOWER EXTREMITY: ICD-10-CM

## 2019-12-23 LAB — SL AMB POCT HEMOGLOBIN AIC: 6.1 (ref ?–6.5)

## 2019-12-23 PROCEDURE — 1036F TOBACCO NON-USER: CPT | Performed by: FAMILY MEDICINE

## 2019-12-23 PROCEDURE — 99214 OFFICE O/P EST MOD 30 MIN: CPT | Performed by: FAMILY MEDICINE

## 2019-12-23 PROCEDURE — 1160F RVW MEDS BY RX/DR IN RCRD: CPT | Performed by: FAMILY MEDICINE

## 2019-12-23 PROCEDURE — 83036 HEMOGLOBIN GLYCOSYLATED A1C: CPT | Performed by: FAMILY MEDICINE

## 2019-12-23 RX ORDER — PREDNISOLONE ACETATE 10 MG/ML
SUSPENSION/ DROPS OPHTHALMIC
COMMUNITY
Start: 2019-11-14 | End: 2020-11-11 | Stop reason: ALTCHOICE

## 2019-12-23 NOTE — PROGRESS NOTES
Assessment/Plan:    Diverticulosis  STABLE  NO RECENT EXACERBATIONS    Type 2 diabetes mellitus without complication, without long-term current use of insulin (HCC)    Lab Results   Component Value Date    HGBA1C 6 2 (H) 08/27/2019       COMPLIANT WITH MEDICATION  DENIES ANY NUMBNESS, TINGLING IN FEET    - DISCUSSED DIETARY MODIFICATION OF CARBOHYDRATES  - HGB A1C AND URINE STUDIES DUE TODAY  - FOOT CARE  - RV 3 MONTHS        Hyperlipidemia  WATCHING CHOLESTEROL INTAKE  COMPLIANT WITH MEDICATION  NO CONCERNS    - CONTINUE TO MONITOR DIETARY CHOL INTAKE  - CONTINUE CURRENT MEDICATION  - ENCOURAGED PHYSICAL ACTIVITY           Diagnoses and all orders for this visit:    Type 2 diabetes mellitus without complication, without long-term current use of insulin (HCC)  -     POCT hemoglobin A1c    Familial hypercholesterolemia    Diverticulosis    Other orders  -     prednisoLONE acetate (PRED FORTE) 1 % ophthalmic suspension          Patient Instructions     CONTINUE CURRENT TREATMENT PLAN  MONITOR DIETARY SODIUM, CHOL, AND CARBOHYDRATE INTAKE  ENCOURAGE PHYSICAL ACTIVITY  FOOT CARE    RV 3 M, SOONER PRN    Recent Results (from the past 6048 hour(s))   TSH, 3rd generation    Collection Time: 08/27/19 11:19 AM   Result Value Ref Range    TSH 2 69 0 40 - 4 50 mIU/L   Lipid panel    Collection Time: 08/27/19 11:19 AM   Result Value Ref Range    Total Cholesterol 198 <200 mg/dL    HDL 54 >50 mg/dL    Triglycerides 235 (H) <150 mg/dL    LDL Direct 108 (H) mg/dL (calc)    Chol HDLC Ratio 3 7 <5 0 (calc)    Non-HDL Cholesterol 144 (H) <130 mg/dL (calc)   Comprehensive metabolic panel    Collection Time: 08/27/19 11:19 AM   Result Value Ref Range    Glucose, Random 115 (H) 65 - 99 mg/dL    BUN 15 7 - 25 mg/dL    Creatinine 0 97 (H) 0 60 - 0 88 mg/dL    eGFR Non  55 (L) > OR = 60 mL/min/1 73m2    eGFR  63 > OR = 60 mL/min/1 73m2    SL AMB BUN/CREATININE RATIO 15 6 - 22 (calc)    Sodium 138 135 - 146 mmol/L    Potassium 4 8 3 5 - 5 3 mmol/L    Chloride 103 98 - 110 mmol/L    CO2 28 20 - 32 mmol/L    SL AMB CALCIUM 10 1 8 6 - 10 4 mg/dL    Protein, Total 6 6 6 1 - 8 1 g/dL    Albumin 4 3 3 6 - 5 1 g/dL    Globulin 2 3 1 9 - 3 7 g/dL (calc)    Albumin/Globulin Ratio 1 9 1 0 - 2 5 (calc)    TOTAL BILIRUBIN 0 4 0 2 - 1 2 mg/dL    Alkaline Phosphatase 46 33 - 130 U/L    AST 21 10 - 35 U/L    ALT 21 6 - 29 U/L   Hemoglobin A1c (w/out EAG)    Collection Time: 08/27/19 11:19 AM   Result Value Ref Range    Hemoglobin A1C 6 2 (H) <5 7 % of total Hgb   Hm Diabetes Eye Exam    Collection Time: 08/29/19 12:00 AM   Result Value Ref Range    Right Eye Diabetic Retinopathy None     Left Eye Diabetic Retinopathy None    POCT hemoglobin A1c    Collection Time: 12/23/19  6:15 PM   Result Value Ref Range    Hemoglobin A1C 6 1 6 5             Return in about 3 months (around 3/23/2020) for Recheck  Subjective:      Patient ID: Amberly Lynn is a 82 y o  female  Chief Complaint   Patient presents with    Diabetes       PATIENT RETURNS FOR ROUTINE EVALUATION OF PATIENT'S MEDICAL ISSUES    INDIVIDUAL MEDICAL ISSUES WITH THEIR CURRENT STATUS, ASSESSMENT AND PLANS ARE LISTED ABOVE          The following portions of the patient's history were reviewed and updated as appropriate: allergies, current medications, past family history, past medical history, past social history, past surgical history and problem list     Review of Systems   Constitutional: Negative for chills, fatigue and fever  HENT: Negative for congestion, ear discharge, ear pain, mouth sores, postnasal drip, sore throat and trouble swallowing  Eyes: Negative for pain, discharge and visual disturbance  Respiratory: Negative for cough, shortness of breath and wheezing  Cardiovascular: Negative for chest pain, palpitations and leg swelling  Gastrointestinal: Negative for abdominal distention, abdominal pain, blood in stool, diarrhea and nausea  Endocrine: Negative for polydipsia, polyphagia and polyuria  Genitourinary: Negative for dysuria, frequency, hematuria and urgency  Musculoskeletal: Negative for arthralgias, gait problem and joint swelling  Skin: Negative for pallor and rash  Neurological: Negative for dizziness, syncope, speech difficulty, weakness, light-headedness, numbness and headaches  Hematological: Negative for adenopathy  Psychiatric/Behavioral: Negative for behavioral problems, confusion and sleep disturbance  The patient is not nervous/anxious            Current Outpatient Medications   Medication Sig Dispense Refill    ALPRAZolam (XANAX) 0 5 mg tablet Take 1 tablet (0 5 mg total) by mouth daily at bedtime as needed for anxiety 30 tablet 0    aluminum hydroxide-magnesium carbonate (GAVISCON)  mg/15 mL oral suspension Take by mouth      Ascorbic Acid (VITAMIN C) 100 MG tablet Take 1 tablet by mouth daily      FLUoxetine (PROZAC) 20 mg capsule Take 1 capsule (20 mg total) by mouth daily 90 capsule 0    Glucosamine-Chondroit-Vit C-Mn (GLUCOSAMINE CHONDR 500 COMPLEX) CAPS Take by mouth      Lactobacillus (ACIDOPHILUS PO) Take by mouth daily      Loratadine 10 MG CAPS Take 1 capsule by mouth daily      Multiple Vitamins-Minerals (CENTRUM SILVER 50+WOMEN PO) Take by mouth daily      Omega-3 Fatty Acids (FISH OIL) 1200 MG CAPS Take 1 capsule by mouth daily      ranitidine (ZANTAC) 150 mg tablet Take 150 mg by mouth every morning      simvastatin (ZOCOR) 20 mg tablet Take 1 tablet (20 mg total) by mouth daily at bedtime 90 tablet 3    Sulfacetamide Sodium-Sulfur 9 8-4 8 % CREA Apply topically 2 (two) times a day       Turmeric (RA TURMERIC) 500 MG CAPS Take 1 capsule by mouth daily      Cranberry-Vitamin C-Probiotic (AZO CRANBERRY PO) Take by mouth      glucose blood (ONE TOUCH ULTRA TEST) test strip 1 each by Other route 2 (two) times a day (Patient not taking: Reported on 12/23/2019) 100 each 3    metFORMIN (GLUCOPHAGE) 500 mg tablet Take 1 tablet by mouth 2 (two) times a day        naphazoline-pheniramine (NAPHCON-A) 0 025-0 3 % ophthalmic solution Apply to eye      ONE TOUCH LANCETS MISC by Does not apply route      prednisoLONE acetate (PRED FORTE) 1 % ophthalmic suspension       tobramycin (TOBREX) 0 3 % SOLN        No current facility-administered medications for this visit  Objective:    /50   Pulse 85   Temp 98 5 °F (36 9 °C) (Temporal)   Resp 16   Ht 5' 4 5" (1 638 m)   Wt 69 6 kg (153 lb 6 4 oz)   SpO2 96%   BMI 25 92 kg/m²        Physical Exam   Constitutional: She is oriented to person, place, and time  She appears well-developed and well-nourished  HENT:   Head: Normocephalic and atraumatic  Eyes: Pupils are equal, round, and reactive to light  Conjunctivae and EOM are normal  Right eye exhibits no discharge  Left eye exhibits no discharge  Neck: Normal range of motion  Neck supple  No thyromegaly present  Cardiovascular: Normal rate, regular rhythm and normal heart sounds  No murmur heard  Pulmonary/Chest: Effort normal and breath sounds normal  No respiratory distress  She has no wheezes  She has no rales  Abdominal: Soft  Bowel sounds are normal  There is no tenderness  Musculoskeletal: Normal range of motion  She exhibits no edema or tenderness  Lymphadenopathy:     She has no cervical adenopathy  Neurological: She is alert and oriented to person, place, and time  Skin: Skin is warm and dry  No rash noted  No erythema  Psychiatric: She has a normal mood and affect  Her behavior is normal  Judgment and thought content normal           Patient's shoes and socks removed        Marc Irvin MD

## 2019-12-23 NOTE — PATIENT INSTRUCTIONS
CONTINUE CURRENT TREATMENT PLAN  MONITOR DIETARY SODIUM, CHOL, AND CARBOHYDRATE INTAKE  ENCOURAGE PHYSICAL ACTIVITY  FOOT CARE    RV 3 M, SOONER PRN    Recent Results (from the past 6048 hour(s))   TSH, 3rd generation    Collection Time: 08/27/19 11:19 AM   Result Value Ref Range    TSH 2 69 0 40 - 4 50 mIU/L   Lipid panel    Collection Time: 08/27/19 11:19 AM   Result Value Ref Range    Total Cholesterol 198 <200 mg/dL    HDL 54 >50 mg/dL    Triglycerides 235 (H) <150 mg/dL    LDL Direct 108 (H) mg/dL (calc)    Chol HDLC Ratio 3 7 <5 0 (calc)    Non-HDL Cholesterol 144 (H) <130 mg/dL (calc)   Comprehensive metabolic panel    Collection Time: 08/27/19 11:19 AM   Result Value Ref Range    Glucose, Random 115 (H) 65 - 99 mg/dL    BUN 15 7 - 25 mg/dL    Creatinine 0 97 (H) 0 60 - 0 88 mg/dL    eGFR Non  55 (L) > OR = 60 mL/min/1 73m2    eGFR  63 > OR = 60 mL/min/1 73m2    SL AMB BUN/CREATININE RATIO 15 6 - 22 (calc)    Sodium 138 135 - 146 mmol/L    Potassium 4 8 3 5 - 5 3 mmol/L    Chloride 103 98 - 110 mmol/L    CO2 28 20 - 32 mmol/L    SL AMB CALCIUM 10 1 8 6 - 10 4 mg/dL    Protein, Total 6 6 6 1 - 8 1 g/dL    Albumin 4 3 3 6 - 5 1 g/dL    Globulin 2 3 1 9 - 3 7 g/dL (calc)    Albumin/Globulin Ratio 1 9 1 0 - 2 5 (calc)    TOTAL BILIRUBIN 0 4 0 2 - 1 2 mg/dL    Alkaline Phosphatase 46 33 - 130 U/L    AST 21 10 - 35 U/L    ALT 21 6 - 29 U/L   Hemoglobin A1c (w/out EAG)    Collection Time: 08/27/19 11:19 AM   Result Value Ref Range    Hemoglobin A1C 6 2 (H) <5 7 % of total Hgb   Hm Diabetes Eye Exam    Collection Time: 08/29/19 12:00 AM   Result Value Ref Range    Right Eye Diabetic Retinopathy None     Left Eye Diabetic Retinopathy None    POCT hemoglobin A1c    Collection Time: 12/23/19  6:15 PM   Result Value Ref Range    Hemoglobin A1C 6 1 6 5

## 2019-12-23 NOTE — ASSESSMENT & PLAN NOTE
Lab Results   Component Value Date    HGBA1C 6 2 (H) 08/27/2019       COMPLIANT WITH MEDICATION  DENIES ANY NUMBNESS, TINGLING IN FEET    - DISCUSSED DIETARY MODIFICATION OF CARBOHYDRATES  - HGB A1C AND URINE STUDIES DUE TODAY  - FOOT CARE  - RV 3 MONTHS

## 2020-01-06 DIAGNOSIS — F41.9 ANXIETY: ICD-10-CM

## 2020-01-08 RX ORDER — ALPRAZOLAM 0.5 MG/1
0.5 TABLET ORAL
Qty: 30 TABLET | Refills: 0 | Status: SHIPPED | OUTPATIENT
Start: 2020-01-08 | End: 2020-02-06 | Stop reason: SDUPTHER

## 2020-02-06 DIAGNOSIS — F41.9 ANXIETY: ICD-10-CM

## 2020-02-06 RX ORDER — ALPRAZOLAM 0.5 MG/1
0.5 TABLET ORAL
Qty: 30 TABLET | Refills: 0 | Status: SHIPPED | OUTPATIENT
Start: 2020-02-06 | End: 2020-03-09 | Stop reason: SDUPTHER

## 2020-02-19 DIAGNOSIS — F32.A DEPRESSION, UNSPECIFIED DEPRESSION TYPE: ICD-10-CM

## 2020-02-19 RX ORDER — FLUOXETINE HYDROCHLORIDE 20 MG/1
20 CAPSULE ORAL DAILY
Qty: 90 CAPSULE | Refills: 0 | Status: SHIPPED | OUTPATIENT
Start: 2020-02-19 | End: 2020-05-20 | Stop reason: SDUPTHER

## 2020-03-09 DIAGNOSIS — F41.9 ANXIETY: ICD-10-CM

## 2020-03-09 RX ORDER — ALPRAZOLAM 0.5 MG/1
0.5 TABLET ORAL
Qty: 30 TABLET | Refills: 0 | Status: SHIPPED | OUTPATIENT
Start: 2020-03-09 | End: 2020-04-07 | Stop reason: SDUPTHER

## 2020-04-07 DIAGNOSIS — F41.9 ANXIETY: ICD-10-CM

## 2020-04-07 RX ORDER — ALPRAZOLAM 0.5 MG/1
0.5 TABLET ORAL
Qty: 30 TABLET | Refills: 0 | Status: SHIPPED | OUTPATIENT
Start: 2020-04-07 | End: 2020-05-07

## 2020-04-15 ENCOUNTER — TELEMEDICINE (OUTPATIENT)
Dept: FAMILY MEDICINE CLINIC | Facility: CLINIC | Age: 85
End: 2020-04-15
Payer: COMMERCIAL

## 2020-04-15 DIAGNOSIS — K57.90 DIVERTICULOSIS: ICD-10-CM

## 2020-04-15 DIAGNOSIS — K57.92 DIVERTICULITIS: ICD-10-CM

## 2020-04-15 DIAGNOSIS — R10.32 LEFT LOWER QUADRANT ABDOMINAL PAIN: Primary | ICD-10-CM

## 2020-04-15 PROCEDURE — G2012 BRIEF CHECK IN BY MD/QHP: HCPCS | Performed by: FAMILY MEDICINE

## 2020-04-15 RX ORDER — CIPROFLOXACIN 250 MG/1
250 TABLET, FILM COATED ORAL EVERY 12 HOURS SCHEDULED
Qty: 20 TABLET | Refills: 0 | Status: SHIPPED | OUTPATIENT
Start: 2020-04-15 | End: 2020-04-25

## 2020-04-15 RX ORDER — METRONIDAZOLE 250 MG/1
250 TABLET ORAL EVERY 12 HOURS SCHEDULED
Qty: 20 TABLET | Refills: 0 | Status: SHIPPED | OUTPATIENT
Start: 2020-04-15 | End: 2020-04-25

## 2020-04-18 ENCOUNTER — TELEMEDICINE (OUTPATIENT)
Dept: FAMILY MEDICINE CLINIC | Facility: CLINIC | Age: 85
End: 2020-04-18
Payer: COMMERCIAL

## 2020-04-18 DIAGNOSIS — R10.32 LEFT LOWER QUADRANT ABDOMINAL PAIN: Primary | ICD-10-CM

## 2020-04-18 DIAGNOSIS — K57.92 DIVERTICULITIS: ICD-10-CM

## 2020-04-18 PROCEDURE — G2012 BRIEF CHECK IN BY MD/QHP: HCPCS | Performed by: FAMILY MEDICINE

## 2020-04-21 ENCOUNTER — TELEMEDICINE (OUTPATIENT)
Dept: FAMILY MEDICINE CLINIC | Facility: CLINIC | Age: 85
End: 2020-04-21
Payer: COMMERCIAL

## 2020-04-21 DIAGNOSIS — R10.32 LEFT LOWER QUADRANT ABDOMINAL PAIN: ICD-10-CM

## 2020-04-21 DIAGNOSIS — K57.92 DIVERTICULITIS: Primary | ICD-10-CM

## 2020-04-21 PROCEDURE — 99442 PR PHYS/QHP TELEPHONE EVALUATION 11-20 MIN: CPT | Performed by: FAMILY MEDICINE

## 2020-05-07 DIAGNOSIS — F41.9 ANXIETY: ICD-10-CM

## 2020-05-07 RX ORDER — ALPRAZOLAM 0.5 MG/1
TABLET ORAL
Qty: 30 TABLET | Refills: 0 | Status: SHIPPED | OUTPATIENT
Start: 2020-05-07 | End: 2020-06-08 | Stop reason: SDUPTHER

## 2020-05-07 RX ORDER — ALPRAZOLAM 0.5 MG/1
0.5 TABLET ORAL
Qty: 30 TABLET | Refills: 0 | OUTPATIENT
Start: 2020-05-07 | End: 2020-06-06

## 2020-05-20 DIAGNOSIS — F32.A DEPRESSION, UNSPECIFIED DEPRESSION TYPE: ICD-10-CM

## 2020-05-20 RX ORDER — FLUOXETINE HYDROCHLORIDE 20 MG/1
20 CAPSULE ORAL DAILY
Qty: 90 CAPSULE | Refills: 0 | Status: SHIPPED | OUTPATIENT
Start: 2020-05-20 | End: 2020-08-20 | Stop reason: SDUPTHER

## 2020-06-08 DIAGNOSIS — F41.9 ANXIETY: ICD-10-CM

## 2020-06-08 RX ORDER — ALPRAZOLAM 0.5 MG/1
0.5 TABLET ORAL
Qty: 30 TABLET | Refills: 0 | Status: SHIPPED | OUTPATIENT
Start: 2020-06-08 | End: 2020-07-06 | Stop reason: SDUPTHER

## 2020-06-26 ENCOUNTER — OFFICE VISIT (OUTPATIENT)
Dept: FAMILY MEDICINE CLINIC | Facility: CLINIC | Age: 85
End: 2020-06-26
Payer: COMMERCIAL

## 2020-06-26 VITALS
HEART RATE: 84 BPM | TEMPERATURE: 99.9 F | WEIGHT: 158 LBS | HEIGHT: 65 IN | SYSTOLIC BLOOD PRESSURE: 140 MMHG | BODY MASS INDEX: 26.33 KG/M2 | RESPIRATION RATE: 18 BRPM | OXYGEN SATURATION: 97 % | DIASTOLIC BLOOD PRESSURE: 70 MMHG

## 2020-06-26 DIAGNOSIS — B37.3 VAGINAL YEAST INFECTION: ICD-10-CM

## 2020-06-26 DIAGNOSIS — E11.9 TYPE 2 DIABETES MELLITUS WITHOUT COMPLICATION, WITHOUT LONG-TERM CURRENT USE OF INSULIN (HCC): Primary | ICD-10-CM

## 2020-06-26 LAB
CREAT UR-MCNC: 47.5 MG/DL
MICROALBUMIN UR-MCNC: 6.8 MG/L (ref 0–20)
MICROALBUMIN/CREAT 24H UR: 14 MG/G CREATININE (ref 0–30)
SL AMB  POCT GLUCOSE, UA: 0
SL AMB LEUKOCYTE ESTERASE,UA: 0
SL AMB POCT BILIRUBIN,UA: 0
SL AMB POCT BLOOD,UA: 0
SL AMB POCT CLARITY,UA: CLEAR
SL AMB POCT COLOR,UA: YELLOW
SL AMB POCT HEMOGLOBIN AIC: 6.6 (ref ?–6.5)
SL AMB POCT KETONES,UA: 0
SL AMB POCT NITRITE,UA: 0
SL AMB POCT PH,UA: 5
SL AMB POCT SPECIFIC GRAVITY,UA: 1.02
SL AMB POCT URINE PROTEIN: 0
SL AMB POCT UROBILINOGEN: 0

## 2020-06-26 PROCEDURE — 82043 UR ALBUMIN QUANTITATIVE: CPT | Performed by: NURSE PRACTITIONER

## 2020-06-26 PROCEDURE — 3044F HG A1C LEVEL LT 7.0%: CPT | Performed by: NURSE PRACTITIONER

## 2020-06-26 PROCEDURE — 3288F FALL RISK ASSESSMENT DOCD: CPT | Performed by: NURSE PRACTITIONER

## 2020-06-26 PROCEDURE — 99213 OFFICE O/P EST LOW 20 MIN: CPT | Performed by: NURSE PRACTITIONER

## 2020-06-26 PROCEDURE — 3077F SYST BP >= 140 MM HG: CPT | Performed by: NURSE PRACTITIONER

## 2020-06-26 PROCEDURE — 1160F RVW MEDS BY RX/DR IN RCRD: CPT | Performed by: NURSE PRACTITIONER

## 2020-06-26 PROCEDURE — 4040F PNEUMOC VAC/ADMIN/RCVD: CPT | Performed by: NURSE PRACTITIONER

## 2020-06-26 PROCEDURE — 82570 ASSAY OF URINE CREATININE: CPT | Performed by: NURSE PRACTITIONER

## 2020-06-26 PROCEDURE — 3078F DIAST BP <80 MM HG: CPT | Performed by: NURSE PRACTITIONER

## 2020-06-26 PROCEDURE — 1036F TOBACCO NON-USER: CPT | Performed by: NURSE PRACTITIONER

## 2020-06-26 PROCEDURE — 81003 URINALYSIS AUTO W/O SCOPE: CPT | Performed by: NURSE PRACTITIONER

## 2020-06-26 PROCEDURE — 3008F BODY MASS INDEX DOCD: CPT | Performed by: NURSE PRACTITIONER

## 2020-06-26 PROCEDURE — 83036 HEMOGLOBIN GLYCOSYLATED A1C: CPT | Performed by: NURSE PRACTITIONER

## 2020-06-26 PROCEDURE — 2022F DILAT RTA XM EVC RTNOPTHY: CPT | Performed by: NURSE PRACTITIONER

## 2020-06-26 PROCEDURE — 1101F PT FALLS ASSESS-DOCD LE1/YR: CPT | Performed by: NURSE PRACTITIONER

## 2020-06-26 RX ORDER — LANCETS
EACH MISCELLANEOUS
Qty: 100 EACH | Refills: 1 | Status: SHIPPED | OUTPATIENT
Start: 2020-06-26 | End: 2020-09-23 | Stop reason: SDUPTHER

## 2020-06-26 RX ORDER — RABEPRAZOLE SODIUM 20 MG/1
TABLET, DELAYED RELEASE ORAL
COMMUNITY
Start: 2020-05-07

## 2020-06-26 RX ORDER — FLUCONAZOLE 150 MG/1
TABLET ORAL
Qty: 1 TABLET | Refills: 0 | Status: SHIPPED | OUTPATIENT
Start: 2020-06-26 | End: 2020-06-29 | Stop reason: SDUPTHER

## 2020-06-29 ENCOUNTER — TELEPHONE (OUTPATIENT)
Dept: FAMILY MEDICINE CLINIC | Facility: CLINIC | Age: 85
End: 2020-06-29

## 2020-06-29 DIAGNOSIS — B37.3 VAGINAL YEAST INFECTION: ICD-10-CM

## 2020-06-29 RX ORDER — FLUCONAZOLE 150 MG/1
TABLET ORAL
Qty: 2 TABLET | Refills: 0 | Status: SHIPPED | OUTPATIENT
Start: 2020-06-29 | End: 2020-07-03

## 2020-07-06 DIAGNOSIS — F41.9 ANXIETY: ICD-10-CM

## 2020-07-06 RX ORDER — ALPRAZOLAM 0.5 MG/1
0.5 TABLET ORAL
Qty: 30 TABLET | Refills: 0 | Status: SHIPPED | OUTPATIENT
Start: 2020-07-06 | End: 2020-08-06 | Stop reason: SDUPTHER

## 2020-08-06 DIAGNOSIS — F41.9 ANXIETY: ICD-10-CM

## 2020-08-06 RX ORDER — ALPRAZOLAM 0.5 MG/1
0.5 TABLET ORAL
Qty: 30 TABLET | Refills: 0 | Status: SHIPPED | OUTPATIENT
Start: 2020-08-06 | End: 2020-09-08 | Stop reason: SDUPTHER

## 2020-08-20 DIAGNOSIS — F32.A DEPRESSION, UNSPECIFIED DEPRESSION TYPE: ICD-10-CM

## 2020-08-20 RX ORDER — FLUOXETINE HYDROCHLORIDE 20 MG/1
20 CAPSULE ORAL DAILY
Qty: 90 CAPSULE | Refills: 0 | Status: SHIPPED | OUTPATIENT
Start: 2020-08-20 | End: 2020-11-16

## 2020-08-27 DIAGNOSIS — E78.5 HYPERLIPIDEMIA, UNSPECIFIED HYPERLIPIDEMIA TYPE: ICD-10-CM

## 2020-08-28 RX ORDER — SIMVASTATIN 20 MG
TABLET ORAL
Qty: 90 TABLET | Refills: 3 | Status: SHIPPED | OUTPATIENT
Start: 2020-08-28 | End: 2021-02-11 | Stop reason: SDUPTHER

## 2020-09-03 ENCOUNTER — TELEPHONE (OUTPATIENT)
Dept: FAMILY MEDICINE CLINIC | Facility: CLINIC | Age: 85
End: 2020-09-03

## 2020-09-03 DIAGNOSIS — Z13.29 SCREENING FOR THYROID DISORDER: ICD-10-CM

## 2020-09-03 DIAGNOSIS — E11.9 TYPE 2 DIABETES MELLITUS WITHOUT COMPLICATION, WITHOUT LONG-TERM CURRENT USE OF INSULIN (HCC): Primary | ICD-10-CM

## 2020-09-03 DIAGNOSIS — F41.9 ANXIETY: ICD-10-CM

## 2020-09-03 DIAGNOSIS — E78.01 FAMILIAL HYPERCHOLESTEROLEMIA: ICD-10-CM

## 2020-09-03 NOTE — TELEPHONE ENCOUNTER
Has an AWV with you on 9/23 and has not received her BW orders yet  Can you please place the orders  Laws Diana  Call Pat when we have them

## 2020-09-08 DIAGNOSIS — F41.9 ANXIETY: ICD-10-CM

## 2020-09-08 RX ORDER — ALPRAZOLAM 0.5 MG/1
0.5 TABLET ORAL
Qty: 30 TABLET | Refills: 0 | Status: SHIPPED | OUTPATIENT
Start: 2020-09-08 | End: 2020-10-05 | Stop reason: SDUPTHER

## 2020-09-09 LAB
ALBUMIN SERPL-MCNC: 4.3 G/DL (ref 3.6–5.1)
ALBUMIN/GLOB SERPL: 2 (CALC) (ref 1–2.5)
ALP SERPL-CCNC: 48 U/L (ref 37–153)
ALT SERPL-CCNC: 23 U/L (ref 6–29)
AST SERPL-CCNC: 21 U/L (ref 10–35)
BILIRUB SERPL-MCNC: 0.5 MG/DL (ref 0.2–1.2)
BUN SERPL-MCNC: 14 MG/DL (ref 7–25)
BUN/CREAT SERPL: 14 (CALC) (ref 6–22)
CALCIUM SERPL-MCNC: 9.8 MG/DL (ref 8.6–10.4)
CHLORIDE SERPL-SCNC: 102 MMOL/L (ref 98–110)
CHOLEST SERPL-MCNC: 174 MG/DL
CHOLEST/HDLC SERPL: 3.1 (CALC)
CO2 SERPL-SCNC: 28 MMOL/L (ref 20–32)
CREAT SERPL-MCNC: 0.99 MG/DL (ref 0.6–0.88)
EST. AVERAGE GLUCOSE BLD GHB EST-MCNC: 137 (CALC)
EST. AVERAGE GLUCOSE BLD GHB EST-SCNC: 7.6 (CALC)
GLOBULIN SER CALC-MCNC: 2.2 G/DL (CALC) (ref 1.9–3.7)
GLUCOSE SERPL-MCNC: 120 MG/DL (ref 65–99)
HBA1C MFR BLD: 6.4 % OF TOTAL HGB
HDLC SERPL-MCNC: 56 MG/DL
LDLC SERPL CALC-MCNC: 91 MG/DL (CALC)
NONHDLC SERPL-MCNC: 118 MG/DL (CALC)
POTASSIUM SERPL-SCNC: 4.9 MMOL/L (ref 3.5–5.3)
PROT SERPL-MCNC: 6.5 G/DL (ref 6.1–8.1)
SL AMB EGFR AFRICAN AMERICAN: 62 ML/MIN/1.73M2
SL AMB EGFR NON AFRICAN AMERICAN: 53 ML/MIN/1.73M2
SODIUM SERPL-SCNC: 141 MMOL/L (ref 135–146)
TRIGL SERPL-MCNC: 177 MG/DL
TSH SERPL-ACNC: 3.65 MIU/L (ref 0.4–4.5)

## 2020-09-23 ENCOUNTER — TELEPHONE (OUTPATIENT)
Dept: FAMILY MEDICINE CLINIC | Facility: CLINIC | Age: 85
End: 2020-09-23

## 2020-09-23 DIAGNOSIS — E11.9 TYPE 2 DIABETES MELLITUS WITHOUT COMPLICATION, WITHOUT LONG-TERM CURRENT USE OF INSULIN (HCC): ICD-10-CM

## 2020-09-23 RX ORDER — LANCETS
EACH MISCELLANEOUS
Qty: 100 EACH | Refills: 1 | Status: SHIPPED | OUTPATIENT
Start: 2020-09-23 | End: 2021-02-04

## 2020-09-24 DIAGNOSIS — E11.9 TYPE 2 DIABETES MELLITUS WITHOUT COMPLICATION, WITHOUT LONG-TERM CURRENT USE OF INSULIN (HCC): Primary | ICD-10-CM

## 2020-09-24 RX ORDER — LANCETS 33 GAUGE
EACH MISCELLANEOUS 2 TIMES DAILY
Qty: 180 EACH | Refills: 1 | Status: SHIPPED | OUTPATIENT
Start: 2020-09-24 | End: 2020-11-11 | Stop reason: SDUPTHER

## 2020-10-05 DIAGNOSIS — F41.9 ANXIETY: ICD-10-CM

## 2020-10-07 RX ORDER — ALPRAZOLAM 0.5 MG/1
0.5 TABLET ORAL
Qty: 30 TABLET | Refills: 0 | Status: SHIPPED | OUTPATIENT
Start: 2020-10-07 | End: 2020-11-04 | Stop reason: SDUPTHER

## 2020-11-04 DIAGNOSIS — F41.9 ANXIETY: ICD-10-CM

## 2020-11-05 DIAGNOSIS — F41.9 ANXIETY: ICD-10-CM

## 2020-11-06 RX ORDER — ALPRAZOLAM 0.5 MG/1
TABLET ORAL
Qty: 30 TABLET | Refills: 0 | Status: SHIPPED | OUTPATIENT
Start: 2020-11-06 | End: 2020-11-11 | Stop reason: SDUPTHER

## 2020-11-06 RX ORDER — ALPRAZOLAM 0.5 MG/1
0.5 TABLET ORAL
Qty: 30 TABLET | Refills: 0 | Status: SHIPPED | OUTPATIENT
Start: 2020-11-06 | End: 2020-12-04 | Stop reason: SDUPTHER

## 2020-11-11 ENCOUNTER — OFFICE VISIT (OUTPATIENT)
Dept: FAMILY MEDICINE CLINIC | Facility: CLINIC | Age: 85
End: 2020-11-11
Payer: COMMERCIAL

## 2020-11-11 VITALS
OXYGEN SATURATION: 97 % | BODY MASS INDEX: 25.61 KG/M2 | RESPIRATION RATE: 16 BRPM | WEIGHT: 150 LBS | TEMPERATURE: 98.3 F | HEART RATE: 81 BPM | DIASTOLIC BLOOD PRESSURE: 70 MMHG | SYSTOLIC BLOOD PRESSURE: 144 MMHG | HEIGHT: 64 IN

## 2020-11-11 DIAGNOSIS — F43.22 ADJUSTMENT REACTION WITH ANXIETY: ICD-10-CM

## 2020-11-11 DIAGNOSIS — E78.01 FAMILIAL HYPERCHOLESTEROLEMIA: ICD-10-CM

## 2020-11-11 DIAGNOSIS — G47.9 SLEEP DISTURBANCE: ICD-10-CM

## 2020-11-11 DIAGNOSIS — E11.9 TYPE 2 DIABETES MELLITUS WITHOUT COMPLICATION, WITHOUT LONG-TERM CURRENT USE OF INSULIN (HCC): ICD-10-CM

## 2020-11-11 DIAGNOSIS — H53.9 VISUAL DISTURBANCE: ICD-10-CM

## 2020-11-11 DIAGNOSIS — Z00.00 MEDICARE ANNUAL WELLNESS VISIT, SUBSEQUENT: Primary | ICD-10-CM

## 2020-11-11 LAB — SL AMB POCT HEMOGLOBIN AIC: 5.8 (ref ?–6.5)

## 2020-11-11 PROCEDURE — 1170F FXNL STATUS ASSESSED: CPT | Performed by: NURSE PRACTITIONER

## 2020-11-11 PROCEDURE — G0439 PPPS, SUBSEQ VISIT: HCPCS | Performed by: NURSE PRACTITIONER

## 2020-11-11 PROCEDURE — 1125F AMNT PAIN NOTED PAIN PRSNT: CPT | Performed by: NURSE PRACTITIONER

## 2020-11-11 PROCEDURE — 1160F RVW MEDS BY RX/DR IN RCRD: CPT | Performed by: NURSE PRACTITIONER

## 2020-11-11 PROCEDURE — 99214 OFFICE O/P EST MOD 30 MIN: CPT | Performed by: NURSE PRACTITIONER

## 2020-11-11 PROCEDURE — 1036F TOBACCO NON-USER: CPT | Performed by: NURSE PRACTITIONER

## 2020-11-11 PROCEDURE — 83036 HEMOGLOBIN GLYCOSYLATED A1C: CPT | Performed by: NURSE PRACTITIONER

## 2020-11-11 RX ORDER — NAPROXEN SODIUM 220 MG
220 TABLET ORAL 2 TIMES DAILY WITH MEALS
COMMUNITY

## 2020-11-12 PROBLEM — G47.9 SLEEP DISTURBANCE: Status: ACTIVE | Noted: 2020-11-12

## 2020-11-12 PROBLEM — H53.9 VISUAL DISTURBANCE: Status: ACTIVE | Noted: 2020-11-12

## 2020-11-16 DIAGNOSIS — F32.A DEPRESSION, UNSPECIFIED DEPRESSION TYPE: ICD-10-CM

## 2020-11-16 RX ORDER — FLUOXETINE HYDROCHLORIDE 20 MG/1
CAPSULE ORAL
Qty: 90 CAPSULE | Refills: 0 | Status: SHIPPED | OUTPATIENT
Start: 2020-11-16 | End: 2021-02-11 | Stop reason: SDUPTHER

## 2020-12-04 DIAGNOSIS — F41.9 ANXIETY: ICD-10-CM

## 2020-12-04 RX ORDER — ALPRAZOLAM 0.5 MG/1
0.5 TABLET ORAL
Qty: 30 TABLET | Refills: 0 | Status: SHIPPED | OUTPATIENT
Start: 2020-12-04 | End: 2021-01-04

## 2020-12-23 DIAGNOSIS — E11.9 TYPE 2 DIABETES MELLITUS WITHOUT COMPLICATION, WITHOUT LONG-TERM CURRENT USE OF INSULIN (HCC): ICD-10-CM

## 2021-01-04 DIAGNOSIS — F41.9 ANXIETY: ICD-10-CM

## 2021-01-04 DIAGNOSIS — E11.9 TYPE 2 DIABETES MELLITUS WITHOUT COMPLICATION, WITHOUT LONG-TERM CURRENT USE OF INSULIN (HCC): ICD-10-CM

## 2021-01-04 RX ORDER — ALPRAZOLAM 0.5 MG/1
0.5 TABLET ORAL
Qty: 30 TABLET | Refills: 0 | OUTPATIENT
Start: 2021-01-04

## 2021-01-04 RX ORDER — ALPRAZOLAM 0.5 MG/1
TABLET ORAL
Qty: 30 TABLET | Refills: 0 | Status: SHIPPED | OUTPATIENT
Start: 2021-01-04 | End: 2021-02-03 | Stop reason: SDUPTHER

## 2021-01-04 RX ORDER — BLOOD SUGAR DIAGNOSTIC
STRIP MISCELLANEOUS
Qty: 100 EACH | Refills: 3 | Status: SHIPPED | OUTPATIENT
Start: 2021-01-04 | End: 2021-02-11 | Stop reason: SDUPTHER

## 2021-01-24 DIAGNOSIS — Z23 ENCOUNTER FOR IMMUNIZATION: ICD-10-CM

## 2021-02-03 DIAGNOSIS — F41.9 ANXIETY: ICD-10-CM

## 2021-02-03 RX ORDER — ALPRAZOLAM 0.5 MG/1
0.5 TABLET ORAL
Qty: 30 TABLET | Refills: 0 | Status: SHIPPED | OUTPATIENT
Start: 2021-02-03 | End: 2021-03-03 | Stop reason: SDUPTHER

## 2021-02-04 ENCOUNTER — TELEPHONE (OUTPATIENT)
Dept: FAMILY MEDICINE CLINIC | Facility: CLINIC | Age: 86
End: 2021-02-04

## 2021-02-04 DIAGNOSIS — E11.9 TYPE 2 DIABETES MELLITUS WITHOUT COMPLICATION, WITHOUT LONG-TERM CURRENT USE OF INSULIN (HCC): ICD-10-CM

## 2021-02-04 RX ORDER — LANCETS 30 GAUGE
EACH MISCELLANEOUS
Qty: 100 EACH | Refills: 1 | Status: SHIPPED | OUTPATIENT
Start: 2021-02-04 | End: 2021-02-11 | Stop reason: SDUPTHER

## 2021-02-04 NOTE — TELEPHONE ENCOUNTER
Needs to have a refill of One touch delicate plus lancets 62L, not in chart    Send to [facility] in [place]

## 2021-02-11 ENCOUNTER — APPOINTMENT (OUTPATIENT)
Dept: RADIOLOGY | Facility: CLINIC | Age: 86
End: 2021-02-11
Payer: COMMERCIAL

## 2021-02-11 ENCOUNTER — OFFICE VISIT (OUTPATIENT)
Dept: FAMILY MEDICINE CLINIC | Facility: CLINIC | Age: 86
End: 2021-02-11
Payer: COMMERCIAL

## 2021-02-11 VITALS
HEART RATE: 60 BPM | DIASTOLIC BLOOD PRESSURE: 70 MMHG | RESPIRATION RATE: 16 BRPM | BODY MASS INDEX: 26.05 KG/M2 | TEMPERATURE: 98.2 F | SYSTOLIC BLOOD PRESSURE: 148 MMHG | WEIGHT: 147 LBS | HEIGHT: 63 IN

## 2021-02-11 DIAGNOSIS — E78.5 HYPERLIPIDEMIA, UNSPECIFIED HYPERLIPIDEMIA TYPE: ICD-10-CM

## 2021-02-11 DIAGNOSIS — F32.A DEPRESSION, UNSPECIFIED DEPRESSION TYPE: ICD-10-CM

## 2021-02-11 DIAGNOSIS — M76.62 ACHILLES TENDINITIS OF LEFT LOWER EXTREMITY: ICD-10-CM

## 2021-02-11 DIAGNOSIS — E11.9 TYPE 2 DIABETES MELLITUS WITHOUT COMPLICATION, WITHOUT LONG-TERM CURRENT USE OF INSULIN (HCC): Primary | ICD-10-CM

## 2021-02-11 LAB — SL AMB POCT HEMOGLOBIN AIC: 6.1 (ref ?–6.5)

## 2021-02-11 PROCEDURE — 99214 OFFICE O/P EST MOD 30 MIN: CPT | Performed by: NURSE PRACTITIONER

## 2021-02-11 PROCEDURE — 3725F SCREEN DEPRESSION PERFORMED: CPT | Performed by: NURSE PRACTITIONER

## 2021-02-11 PROCEDURE — 73610 X-RAY EXAM OF ANKLE: CPT

## 2021-02-11 PROCEDURE — 1160F RVW MEDS BY RX/DR IN RCRD: CPT | Performed by: NURSE PRACTITIONER

## 2021-02-11 PROCEDURE — 1036F TOBACCO NON-USER: CPT | Performed by: NURSE PRACTITIONER

## 2021-02-11 PROCEDURE — 83036 HEMOGLOBIN GLYCOSYLATED A1C: CPT | Performed by: NURSE PRACTITIONER

## 2021-02-11 RX ORDER — PREDNISONE 20 MG/1
TABLET ORAL
Qty: 11 TABLET | Refills: 0 | Status: SHIPPED | OUTPATIENT
Start: 2021-02-11 | End: 2021-05-13 | Stop reason: ALTCHOICE

## 2021-02-11 RX ORDER — FLUOXETINE HYDROCHLORIDE 20 MG/1
20 CAPSULE ORAL DAILY
Qty: 90 CAPSULE | Refills: 3 | Status: SHIPPED | OUTPATIENT
Start: 2021-02-11 | End: 2022-02-21 | Stop reason: SDUPTHER

## 2021-02-11 RX ORDER — SIMVASTATIN 20 MG
20 TABLET ORAL
Qty: 90 TABLET | Refills: 3 | Status: SHIPPED | OUTPATIENT
Start: 2021-02-11 | End: 2021-12-20

## 2021-02-11 RX ORDER — LANCETS 30 GAUGE
EACH MISCELLANEOUS
Qty: 100 EACH | Refills: 4 | Status: SHIPPED | OUTPATIENT
Start: 2021-02-11 | End: 2021-05-04 | Stop reason: SDUPTHER

## 2021-02-11 RX ORDER — BLOOD SUGAR DIAGNOSTIC
1 STRIP MISCELLANEOUS 2 TIMES DAILY
Qty: 100 EACH | Refills: 4 | Status: SHIPPED | OUTPATIENT
Start: 2021-02-11 | End: 2021-05-04 | Stop reason: SDUPTHER

## 2021-02-11 NOTE — PROGRESS NOTES
Assessment/Plan:    1  Type 2 diabetes mellitus without complication, without long-term current use of insulin ([facility] [address] )  Assessment & Plan:  Pt is doing very well  I advise that we continue medications as directed  MONITOR BG CLOSELY WHILE ON PREDNISONE TAPER  STRICT GLUCOSE CONTROL  Lab Results   Component Value Date    HGBA1C 6 1 02/11/2021       Orders:  -     Lancets (OneTouch Delica Plus [id number]) MISC; TEST TWO TIMES A DAY BEFORE BREAKFAST AND BEFORE DINNER  USE AS INSTRUCTED  -     glucose blood (OneTouch Ultra) test strip; Use 1 each 2 (two) times a day Test  -     metFORMIN (GLUCOPHAGE) 500 mg tablet; Take 1 tablet (500 mg total) by mouth 2 (two) times a day  -     POCT hemoglobin A1c    2  Depression, unspecified depression type  -     FLUoxetine (PROzac) 20 mg capsule; Take 1 capsule (20 mg total) by mouth daily    3  Hyperlipidemia, unspecified hyperlipidemia type  -     simvastatin (ZOCOR) 20 mg tablet; Take 1 tablet (20 mg total) by mouth daily at bedtime    4  Achilles tendinitis of left lower extremity  -     XR ankle 3+ vw left; Future; Expected date: 02/11/2021  -     predniSONE 20 mg tablet; Take 2 tablets PO daily x's 3 days, then take 1 tablet daily x's 3 days, then take 1/2 tablet daily x's 3 days  -     Ambulatory referral to Physical Therapy; Future          Return in about 3 months (around 5/11/2021) for Diabetic Follow Up  Subjective:      Patient ID: Daija Turcios is a 82 y o  female  Chief Complaint   Patient presents with    Diabetes    Medication Management     can Lancets & test strips be a 90 day supply as well as all the medications       Mounika Guan is an 82year old female who presents to the office for her diabetic follow up  Reports she has left ankle swelling and pain for over 6 months  Denies any injury         The following portions of the patient's history were reviewed and updated as appropriate: allergies, current medications, past family history, past medical history, past social history, past surgical history and problem list     Review of Systems   Constitutional: Negative for chills, fatigue and fever  Respiratory: Negative for cough, chest tightness and shortness of breath  Cardiovascular: Negative for chest pain  Musculoskeletal: Positive for arthralgias, gait problem, joint swelling and myalgias  Current Outpatient Medications   Medication Sig Dispense Refill    ALPRAZolam (XANAX) 0 5 mg tablet Take 1 tablet (0 5 mg total) by mouth daily at bedtime 30 tablet 0    aluminum hydroxide-magnesium carbonate (GAVISCON)  mg/15 mL oral suspension Take by mouth      FLUoxetine (PROzac) 20 mg capsule Take 1 capsule (20 mg total) by mouth daily 90 capsule 3    glucose blood (OneTouch Ultra) test strip Use 1 each 2 (two) times a day Test 100 each 4    Lactobacillus (ACIDOPHILUS PO) Take by mouth daily      Lancets (OneTouch Delica Plus [id number]) MISC TEST TWO TIMES A DAY BEFORE BREAKFAST AND BEFORE DINNER  USE AS INSTRUCTED  100 each 4    Loratadine 10 MG CAPS Take 1 capsule by mouth daily      metFORMIN (GLUCOPHAGE) 500 mg tablet Take 1 tablet (500 mg total) by mouth 2 (two) times a day 180 tablet 3    Multiple Vitamins-Minerals (CENTRUM SILVER 50+WOMEN PO) Take by mouth daily      naproxen sodium (Aleve) 220 MG tablet Take 220 mg by mouth 2 (two) times a day with meals      ONE TOUCH LANCETS MISC by Does not apply route      RABEprazole (ACIPHEX) 20 MG tablet       simvastatin (ZOCOR) 20 mg tablet Take 1 tablet (20 mg total) by mouth daily at bedtime 90 tablet 3    predniSONE 20 mg tablet Take 2 tablets PO daily x's 3 days, then take 1 tablet daily x's 3 days, then take 1/2 tablet daily x's 3 days 11 tablet 0     No current facility-administered medications for this visit          Objective:    /70   Pulse 60   Temp 98 2 °F (36 8 °C) (Temporal)   Resp 16   Ht 5' 3" (1 6 m)   Wt 66 7 kg (147 lb)   BMI 26 04 kg/m²        Physical Exam  Constitutional:       General: She is not in acute distress  Appearance: She is well-developed  She is not diaphoretic  HENT:      Head: Normocephalic and atraumatic  Eyes:      General:         Right eye: No discharge  Left eye: No discharge  Conjunctiva/sclera: Conjunctivae normal    Neck:      Musculoskeletal: Normal range of motion and neck supple  Thyroid: No thyromegaly  Cardiovascular:      Rate and Rhythm: Normal rate and regular rhythm  Pulses: no weak pulses          Dorsalis pedis pulses are 2+ on the right side and 2+ on the left side  Posterior tibial pulses are 2+ on the right side and 2+ on the left side  Heart sounds: Normal heart sounds  Pulmonary:      Effort: Pulmonary effort is normal  No respiratory distress  Breath sounds: Normal breath sounds  No decreased breath sounds, wheezing, rhonchi or rales  Musculoskeletal:      Left ankle: Achilles tendon exhibits pain and defect  Comments: Left achilles tendon swelling and tenderness   Feet:      Right foot:      Skin integrity: No ulcer, skin breakdown, erythema, warmth, callus or dry skin  Left foot:      Skin integrity: No ulcer, skin breakdown, erythema, warmth, callus or dry skin  Lymphadenopathy:      Cervical: No cervical adenopathy  Skin:     General: Skin is warm and dry  Findings: No rash  Neurological:      General: No focal deficit present  Mental Status: She is alert and oriented to person, place, and time  Deep Tendon Reflexes:      Reflex Scores:       Achilles reflexes are 2+ on the left side  Psychiatric:         Behavior: Behavior normal          Thought Content: Thought content normal          Judgment: Judgment normal             Patient's shoes and socks removed  Right Foot/Ankle   Right Foot Inspection  Skin Exam: skin normal and skin intact no dry skin, no warmth, no callus, no erythema, no maceration, no abnormal color, no pre-ulcer, no ulcer and no callus                          Toe Exam: ROM and strength within normal limits  Sensory   Vibration: intact  Proprioception: intact   Monofilament testing: intact  Vascular  Capillary refills: < 3 seconds  The right DP pulse is 2+  The right PT pulse is 2+  Left Foot/Ankle  Left Foot Inspection  Skin Exam: skin normal and skin intactno dry skin, no warmth, no erythema, no maceration, normal color, no pre-ulcer, no ulcer and no callus                         Toe Exam: ROM and strength within normal limits                   Sensory   Vibration: intact  Proprioception: intact  Monofilament: intact  Vascular  Capillary refills: < 3 seconds  The left DP pulse is 2+  The left PT pulse is 2+  Assign Risk Category:  No deformity present; No loss of protective sensation;  No weak pulses       Risk: [address]

## 2021-02-11 NOTE — ASSESSMENT & PLAN NOTE
Pt is doing very well  I advise that we continue medications as directed  MONITOR BG CLOSELY WHILE ON PREDNISONE TAPER  STRICT GLUCOSE CONTROL      Lab Results   Component Value Date    HGBA1C 6 1 02/11/2021

## 2021-02-18 DIAGNOSIS — M67.972 DISORDER OF LEFT ACHILLES TENDON: Primary | ICD-10-CM

## 2021-03-03 DIAGNOSIS — F41.9 ANXIETY: ICD-10-CM

## 2021-03-03 RX ORDER — ALPRAZOLAM 0.5 MG/1
0.5 TABLET ORAL
Qty: 30 TABLET | Refills: 0 | Status: SHIPPED | OUTPATIENT
Start: 2021-03-03 | End: 2021-04-02 | Stop reason: SDUPTHER

## 2021-04-02 DIAGNOSIS — F41.9 ANXIETY: ICD-10-CM

## 2021-04-02 RX ORDER — ALPRAZOLAM 0.5 MG/1
0.5 TABLET ORAL
Qty: 30 TABLET | Refills: 0 | Status: SHIPPED | OUTPATIENT
Start: 2021-04-02 | End: 2021-05-03 | Stop reason: SDUPTHER

## 2021-05-03 DIAGNOSIS — F41.9 ANXIETY: ICD-10-CM

## 2021-05-03 RX ORDER — ALPRAZOLAM 0.5 MG/1
0.5 TABLET ORAL
Qty: 30 TABLET | Refills: 0 | Status: SHIPPED | OUTPATIENT
Start: 2021-05-03 | End: 2021-06-02 | Stop reason: SDUPTHER

## 2021-05-04 DIAGNOSIS — E11.9 TYPE 2 DIABETES MELLITUS WITHOUT COMPLICATION, WITHOUT LONG-TERM CURRENT USE OF INSULIN (HCC): ICD-10-CM

## 2021-05-04 RX ORDER — LANCETS 30 GAUGE
EACH MISCELLANEOUS
Qty: 100 EACH | Refills: 4 | Status: SHIPPED | OUTPATIENT
Start: 2021-05-04 | End: 2022-01-19 | Stop reason: SDUPTHER

## 2021-05-04 RX ORDER — BLOOD SUGAR DIAGNOSTIC
1 STRIP MISCELLANEOUS 2 TIMES DAILY
Qty: 100 EACH | Refills: 4 | Status: SHIPPED | OUTPATIENT
Start: 2021-05-04 | End: 2022-01-19 | Stop reason: SDUPTHER

## 2021-05-04 NOTE — TELEPHONE ENCOUNTER
This was recently sent to [facility] but [employer] does not cover this    These two need to go to [address]

## 2021-05-06 ENCOUNTER — RA CDI HCC (OUTPATIENT)
Dept: OTHER | Facility: HOSPITAL | Age: 86
End: 2021-05-06

## 2021-05-06 NOTE — PROGRESS NOTES
[address]  coding opportunities             Chart reviewed, (number of) suggestions sent to provider: 1           Patients insurance company: [employer] ([employer] and [employer])     Visit status: Patient arrived for their scheduled appointment     Provider never responded to [facility]  coding request  Suggestion not used     [facility]  coding opportunities             Chart reviewed, (number of) suggestions sent to provider: 1           Patients insurance company: [employer] (Medicare [employer]vantage and [employer])             F33 0 Major depressive disorder, recurrent, mild ([facility] [address] )    F33 1 Major depressive disorder, recurrent, moderate ([facility])    F33 40 Major depressive disorder, recurrent, in remission, unspecified ([facility] [address] )    F33  41 Major depressive disorder, recurrent, in partial remission ([facility] [address] )    F33 8 Other recurrent depressive disorders ([facility] )     If this is correct, please document and assess at your next visit 5/13/21

## 2021-05-11 RX ORDER — OMEGA-3/DHA/EPA/FISH OIL 300-1000MG
CAPSULE ORAL
COMMUNITY

## 2021-05-11 RX ORDER — POLYETHYLENE GLYCOL 400 AND PROPYLENE GLYCOL 4; 3 MG/ML; MG/ML
SOLUTION/ DROPS OPHTHALMIC
COMMUNITY

## 2021-05-11 RX ORDER — PSYLLIUM SEED (WITH DEXTROSE)
POWDER (GRAM) ORAL
COMMUNITY
End: 2021-05-13 | Stop reason: HOSPADM

## 2021-05-13 ENCOUNTER — OFFICE VISIT (OUTPATIENT)
Dept: FAMILY MEDICINE CLINIC | Facility: CLINIC | Age: 86
End: 2021-05-13
Payer: COMMERCIAL

## 2021-05-13 VITALS
BODY MASS INDEX: 27.11 KG/M2 | RESPIRATION RATE: 16 BRPM | SYSTOLIC BLOOD PRESSURE: 132 MMHG | TEMPERATURE: 97.3 F | WEIGHT: 153 LBS | OXYGEN SATURATION: 97 % | DIASTOLIC BLOOD PRESSURE: 66 MMHG | HEIGHT: 63 IN | HEART RATE: 100 BPM

## 2021-05-13 DIAGNOSIS — E11.9 TYPE 2 DIABETES MELLITUS WITHOUT COMPLICATION, WITHOUT LONG-TERM CURRENT USE OF INSULIN (HCC): Primary | ICD-10-CM

## 2021-05-13 DIAGNOSIS — L40.9 PSORIASIS: ICD-10-CM

## 2021-05-13 DIAGNOSIS — R00.0 TACHYCARDIA: ICD-10-CM

## 2021-05-13 DIAGNOSIS — E78.2 MIXED HYPERLIPIDEMIA: ICD-10-CM

## 2021-05-13 DIAGNOSIS — R07.9 CHEST PAIN, UNSPECIFIED TYPE: ICD-10-CM

## 2021-05-13 DIAGNOSIS — K57.90 DIVERTICULOSIS: ICD-10-CM

## 2021-05-13 PROBLEM — K57.92 DIVERTICULITIS: Status: RESOLVED | Noted: 2020-04-15 | Resolved: 2021-05-13

## 2021-05-13 PROBLEM — R10.32 LEFT LOWER QUADRANT ABDOMINAL PAIN: Status: RESOLVED | Noted: 2020-04-15 | Resolved: 2021-05-13

## 2021-05-13 LAB — SL AMB POCT HEMOGLOBIN AIC: 6.3 (ref ?–6.5)

## 2021-05-13 PROCEDURE — 1160F RVW MEDS BY RX/DR IN RCRD: CPT | Performed by: FAMILY MEDICINE

## 2021-05-13 PROCEDURE — 1036F TOBACCO NON-USER: CPT | Performed by: FAMILY MEDICINE

## 2021-05-13 PROCEDURE — 83036 HEMOGLOBIN GLYCOSYLATED A1C: CPT | Performed by: FAMILY MEDICINE

## 2021-05-13 PROCEDURE — 99214 OFFICE O/P EST MOD 30 MIN: CPT | Performed by: FAMILY MEDICINE

## 2021-05-13 PROCEDURE — 93000 ELECTROCARDIOGRAM COMPLETE: CPT | Performed by: FAMILY MEDICINE

## 2021-05-13 RX ORDER — MULTIVIT WITH MINERALS/LUTEIN
500 TABLET ORAL DAILY
COMMUNITY

## 2021-05-13 NOTE — PATIENT INSTRUCTIONS
CONTINUE CURRENT TREATMENT PLAN  MONITOR DIETARY SODIUM, CHOL, AND CARBOHYDRATE INTAKE  ENCOURAGE PHYSICAL ACTIVITY  FOOT CARE    FOLLOW UP WITH DR ORDAZ  RV 10 M, SOONER PRN

## 2021-05-13 NOTE — ASSESSMENT & PLAN NOTE
Lab Results   Component Value Date    HGBA1C 6 1 02/11/2021       COMPLIANT WITH MEDICATION  DENIES ANY NUMBNESS, TINGLING IN FEET    - DISCUSSED DIETARY MODIFICATION OF CARBOHYDRATES  - HGB A1C AND URINE STUDIES DUE TODAY  - FOOT CARE  - RV 6 MONTHS

## 2021-05-14 NOTE — PROGRESS NOTES
BMI Counseling: Body mass index is 27 1 kg/m²  The BMI is above normal  Nutrition recommendations include encouraging healthy choices of fruits and vegetables and moderation in carbohydrate intake  Exercise recommendations include exercising 3-5 times per week  No pharmacotherapy was ordered  Assessment/Plan:    Type 2 diabetes mellitus without complication, without long-term current use of insulin (HCC)    Lab Results   Component Value Date    HGBA1C 6 1 02/11/2021       COMPLIANT WITH MEDICATION  DENIES ANY NUMBNESS, TINGLING IN FEET    - DISCUSSED DIETARY MODIFICATION OF CARBOHYDRATES  - HGB A1C AND URINE STUDIES DUE TODAY  - FOOT CARE  - RV 6 MONTHS        Hyperlipidemia  WATCHING CHOLESTEROL INTAKE  COMPLIANT WITH MEDICATION  NO CONCERNS    - CONTINUE TO MONITOR DIETARY CHOL INTAKE  - CONTINUE CURRENT MEDICATION  - ENCOURAGED PHYSICAL ACTIVITY        Psoriasis  STABLE      Diverticulosis  STABLE      Chest pain  SOME CHEST PRESSURE WITH EXERTION  SL SOB       Diagnoses and all orders for this visit:    Type 2 diabetes mellitus without complication, without long-term current use of insulin (HCC)  -     POCT hemoglobin A1c    Mixed hyperlipidemia    Psoriasis    Diverticulosis    Tachycardia  -     POCT ECG    Chest pain, unspecified type    Other orders  -     ascorbic acid (VITAMIN C) 250 mg tablet; Take 500 mg by mouth daily  -     Misc Natural Products (LUTEIN 20 PO); Take by mouth daily  -     Calcium Carbonate-Vitamin D (CALTRATE 600+D PO); Take by mouth daily          Patient Instructions   CONTINUE CURRENT TREATMENT PLAN  MONITOR DIETARY SODIUM, CHOL, AND CARBOHYDRATE INTAKE  ENCOURAGE PHYSICAL ACTIVITY  FOOT CARE    FOLLOW UP WITH DR Hardeep Summers  RV 10 M, SOONER PRN        Return in about 3 months (around 8/13/2021)  Subjective:      Patient ID: Endy Nascimento is a 82 y o  female      Chief Complaint   Patient presents with    Diabetes    Follow-up       PATIENT RETURNS FOR ROUTINE EVALUATION OF PATIENT'S MEDICAL ISSUES    INDIVIDUAL MEDICAL ISSUES WITH THEIR CURRENT STATUS, ASSESSMENT AND PLANS ARE LISTED ABOVE          The following portions of the patient's history were reviewed and updated as appropriate: allergies, current medications, past family history, past medical history, past social history, past surgical history and problem list     Review of Systems   Constitutional: Negative for chills, fatigue and fever  HENT: Negative for congestion, ear discharge, ear pain, mouth sores, postnasal drip, sore throat and trouble swallowing  Eyes: Negative for pain, discharge and visual disturbance  Respiratory: Negative for cough, shortness of breath and wheezing  Cardiovascular: Positive for chest pain  Negative for palpitations and leg swelling  Gastrointestinal: Negative for abdominal distention, abdominal pain, blood in stool, diarrhea and nausea  Endocrine: Negative for polydipsia, polyphagia and polyuria  Genitourinary: Negative for dysuria, frequency, hematuria and urgency  Musculoskeletal: Positive for arthralgias  Negative for gait problem and joint swelling  Skin: Negative for pallor and rash  Neurological: Negative for dizziness, syncope, speech difficulty, weakness, light-headedness, numbness and headaches  Hematological: Negative for adenopathy  Psychiatric/Behavioral: Negative for behavioral problems, confusion and sleep disturbance  The patient is not nervous/anxious            Current Outpatient Medications   Medication Sig Dispense Refill    ALPRAZolam (XANAX) 0 5 mg tablet Take 1 tablet (0 5 mg total) by mouth daily at bedtime 30 tablet 0    aluminum hydroxide-magnesium carbonate (GAVISCON)  mg/15 mL oral suspension Take by mouth daily as needed       ascorbic acid (VITAMIN C) 250 mg tablet Take 500 mg by mouth daily      Calcium Carbonate-Vitamin D (CALTRATE 600+D PO) Take by mouth daily      fish oil-omega-3 fatty acids 1000 MG capsule Take by mouth  FLUoxetine (PROzac) 20 mg capsule Take 1 capsule (20 mg total) by mouth daily 90 capsule 3    glucose blood (OneTouch Ultra) test strip Use 1 each 2 (two) times a day Test 100 each 4    Lactobacillus (ACIDOPHILUS PO) Take by mouth daily      Lancets (OneTouch Delica Plus [id number]) MISC TEST TWO TIMES A DAY BEFORE BREAKFAST AND BEFORE DINNER  USE AS INSTRUCTED  100 each 4    Loratadine 10 MG CAPS Take 1 capsule by mouth daily as needed       metFORMIN (GLUCOPHAGE) 500 mg tablet Take 1 tablet (500 mg total) by mouth 2 (two) times a day 180 tablet 3    Misc Natural Products (LUTEIN 20 PO) Take by mouth daily      Multiple Vitamins-Minerals (CENTRUM SILVER 50+WOMEN PO) Take by mouth daily      naproxen sodium (Aleve) 220 MG tablet Take 220 mg by mouth 2 (two) times a day with meals      ONE TOUCH LANCETS MISC by Does not apply route      polyethylene glycol-propylene glycol (Systane) 0 4-0 3 % both eyes four times daily      RABEprazole (ACIPHEX) 20 MG tablet       simvastatin (ZOCOR) 20 mg tablet Take 1 tablet (20 mg total) by mouth daily at bedtime 90 tablet 3    predniSONE 20 mg tablet Take 2 tablets PO daily x's 3 days, then take 1 tablet daily x's 3 days, then take 1/2 tablet daily x's 3 days 11 tablet 0    Psyllium (Metamucil) WAFR OTC as needed for constipation       No current facility-administered medications for this visit  Objective:    /66 (BP Location: Left arm, Patient Position: Sitting, Cuff Size: Adult)   Pulse 100   Temp (!) 97 3 °F (36 3 °C) (Temporal)   Resp 16   Ht 5' 3" (1 6 m)   Wt 69 4 kg (153 lb)   SpO2 97%   BMI 27 10 kg/m²        Physical Exam  Constitutional:       Appearance: She is well-developed  HENT:      Head: Normocephalic and atraumatic  Eyes:      General:         Right eye: No discharge  Left eye: No discharge  Conjunctiva/sclera: Conjunctivae normal       Pupils: Pupils are equal, round, and reactive to light     Neck: Musculoskeletal: Normal range of motion and neck supple  Thyroid: No thyromegaly  Cardiovascular:      Rate and Rhythm: Normal rate and regular rhythm  Heart sounds: Normal heart sounds  No murmur  Pulmonary:      Effort: Pulmonary effort is normal  No respiratory distress  Breath sounds: Normal breath sounds  No wheezing or rales  Abdominal:      General: Bowel sounds are normal       Palpations: Abdomen is soft  Tenderness: There is no abdominal tenderness  Musculoskeletal:         General: No tenderness  Comments: MILD DJD CHANGES   Lymphadenopathy:      Cervical: No cervical adenopathy  Skin:     General: Skin is warm and dry  Findings: No erythema or rash  Neurological:      Mental Status: She is alert and oriented to person, place, and time  Psychiatric:         Behavior: Behavior normal          Thought Content:  Thought content normal          Judgment: Judgment normal                 Del Garza MD

## 2021-06-02 DIAGNOSIS — F41.9 ANXIETY: ICD-10-CM

## 2021-06-02 RX ORDER — ALPRAZOLAM 0.5 MG/1
0.5 TABLET ORAL
Qty: 30 TABLET | Refills: 0 | Status: SHIPPED | OUTPATIENT
Start: 2021-06-02 | End: 2021-07-06 | Stop reason: SDUPTHER

## 2021-07-06 DIAGNOSIS — F41.9 ANXIETY: ICD-10-CM

## 2021-07-06 RX ORDER — ALPRAZOLAM 0.5 MG/1
0.5 TABLET ORAL
Qty: 30 TABLET | Refills: 0 | Status: SHIPPED | OUTPATIENT
Start: 2021-07-06 | End: 2021-08-02 | Stop reason: SDUPTHER

## 2021-08-02 DIAGNOSIS — F41.9 ANXIETY: ICD-10-CM

## 2021-08-04 RX ORDER — ALPRAZOLAM 0.5 MG/1
0.5 TABLET ORAL
Qty: 30 TABLET | Refills: 0 | Status: SHIPPED | OUTPATIENT
Start: 2021-08-04 | End: 2021-09-05

## 2021-08-09 ENCOUNTER — TELEPHONE (OUTPATIENT)
Dept: FAMILY MEDICINE CLINIC | Facility: CLINIC | Age: 86
End: 2021-08-09

## 2021-08-09 ENCOUNTER — TRANSITIONAL CARE MANAGEMENT (OUTPATIENT)
Dept: FAMILY MEDICINE CLINIC | Facility: CLINIC | Age: 86
End: 2021-08-09

## 2021-08-09 NOTE — TELEPHONE ENCOUNTER
Pat scheduled a ALBERTO 8/19 with Dr Willena Mohs  She was admitted to [facility] 8/6-8/7  Please call to get addl info on hospital stay    thanks

## 2021-08-13 ENCOUNTER — RA CDI HCC (OUTPATIENT)
Dept: OTHER | Facility: HOSPITAL | Age: 86
End: 2021-08-13

## 2021-08-13 NOTE — PROGRESS NOTES
[facility]  coding opportunities             Chart Reviewed * (Number of) Inbasket suggestions sent to Provider: 2               Number of suggestions NOT actually used: 2     Patients insurance company: [address]  (Medicare Advantage and Commercial)     Visit status: Patient arrived for their scheduled appointment     Provider never responded to [facility]  coding request     [facility]  coding opportunities             Chart Reviewed * (Number of) Inbasket suggestions sent to Provider: 2     I47 1 Supraventricular tachycardia ([facility] )    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

## 2021-08-19 ENCOUNTER — OFFICE VISIT (OUTPATIENT)
Dept: FAMILY MEDICINE CLINIC | Facility: CLINIC | Age: 86
End: 2021-08-19
Payer: COMMERCIAL

## 2021-08-19 VITALS
BODY MASS INDEX: 26.22 KG/M2 | HEART RATE: 96 BPM | OXYGEN SATURATION: 97 % | RESPIRATION RATE: 20 BRPM | SYSTOLIC BLOOD PRESSURE: 128 MMHG | HEIGHT: 63 IN | TEMPERATURE: 97.6 F | WEIGHT: 148 LBS | DIASTOLIC BLOOD PRESSURE: 60 MMHG

## 2021-08-19 DIAGNOSIS — E11.9 TYPE 2 DIABETES MELLITUS WITHOUT COMPLICATION, WITHOUT LONG-TERM CURRENT USE OF INSULIN (HCC): ICD-10-CM

## 2021-08-19 DIAGNOSIS — R00.0 TACHYCARDIA: ICD-10-CM

## 2021-08-19 DIAGNOSIS — E78.2 MIXED HYPERLIPIDEMIA: ICD-10-CM

## 2021-08-19 DIAGNOSIS — Z76.89 ENCOUNTER FOR SUPPORT AND COORDINATION OF TRANSITION OF CARE: Primary | ICD-10-CM

## 2021-08-19 DIAGNOSIS — L40.9 PSORIASIS: ICD-10-CM

## 2021-08-19 DIAGNOSIS — R07.9 CHEST PAIN, UNSPECIFIED TYPE: ICD-10-CM

## 2021-08-19 PROCEDURE — 99495 TRANSJ CARE MGMT MOD F2F 14D: CPT | Performed by: FAMILY MEDICINE

## 2021-08-19 NOTE — PROGRESS NOTES
Assessment/Plan:    Encounter for support and coordination of transition of care  PATIENT IS S/P [facility] ADMISSION FOR CHEST PAIN    DATE OF DISCHARGE - 8/7/2021    REVIEWED HOSPITAL COURSE, DISCHARGE INSTUCTIONS AND MEDICATIONS    PATIENT FEELS BETTER  DENIES ANY CP, SOB, PALPITATIONS  NO NVD    CARDIOLOGY FOLLOW UP IS SCHEDULED       Diagnoses and all orders for this visit:    Encounter for support and coordination of transition of care    Chest pain, unspecified type    Tachycardia    Type 2 diabetes mellitus without complication, without long-term current use of insulin (HCC)    Mixed hyperlipidemia    Psoriasis    Other orders  -     VITAMIN E PO; Take 180 mg by mouth daily          Patient Instructions   CONTINUE CURRENT TREATMENT PLAN      Return if symptoms worsen or fail to improve, for Next scheduled follow up Bridgette Banks  Subjective:      Patient ID: Harleen Tyler is a 82 y o  female  Chief Complaint   Patient presents with    Transition of Care Management       TRANSITION OF CARE        The following portions of the patient's history were reviewed and updated as appropriate: allergies, current medications, past family history, past medical history, past social history, past surgical history and problem list     Review of Systems   Constitutional: Negative for chills, fatigue and fever  HENT: Negative for congestion, ear discharge, ear pain, mouth sores, postnasal drip, sore throat and trouble swallowing  Eyes: Negative for pain, discharge and visual disturbance  Respiratory: Negative for cough, shortness of breath and wheezing  Cardiovascular: Negative for chest pain, palpitations and leg swelling  Gastrointestinal: Negative for abdominal distention, abdominal pain, blood in stool, diarrhea and nausea  Endocrine: Negative for polydipsia, polyphagia and polyuria  Genitourinary: Negative for dysuria, frequency, hematuria and urgency  Musculoskeletal: Positive for arthralgias  Negative for gait problem and joint swelling  Skin: Negative for pallor and rash  Neurological: Negative for dizziness, syncope, speech difficulty, weakness, light-headedness, numbness and headaches  Hematological: Negative for adenopathy  Psychiatric/Behavioral: Negative for behavioral problems, confusion and sleep disturbance  The patient is not nervous/anxious  Current Outpatient Medications   Medication Sig Dispense Refill    ALPRAZolam (XANAX) 0 5 mg tablet Take 1 tablet (0 5 mg total) by mouth daily at bedtime 30 tablet 0    aluminum hydroxide-magnesium carbonate (Gaviscon)  mg/15 mL oral suspension Take by mouth daily as needed       ascorbic acid (VITAMIN C) 250 mg tablet Take 500 mg by mouth daily      Calcium Carbonate-Vitamin D (CALTRATE 600+D PO) Take by mouth daily       fish oil-omega-3 fatty acids 1000 MG capsule Take by mouth      FLUoxetine (PROzac) 20 mg capsule Take 1 capsule (20 mg total) by mouth daily 90 capsule 3    glucose blood (OneTouch Ultra) test strip Use 1 each 2 (two) times a day Test 100 each 4    Lactobacillus (ACIDOPHILUS PO) Take by mouth daily      Lancets (OneTouch Delica Plus [id number]) MISC TEST TWO TIMES A DAY BEFORE BREAKFAST AND BEFORE DINNER   USE AS INSTRUCTED  100 each 4    metFORMIN (GLUCOPHAGE) 500 mg tablet Take 1 tablet (500 mg total) by mouth 2 (two) times a day 180 tablet 3    Misc Natural Products (LUTEIN 20 PO) Take by mouth daily      Multiple Vitamins-Minerals (CENTRUM SILVER 50+WOMEN PO) Take by mouth daily      naproxen sodium (Aleve) 220 MG tablet Take 220 mg by mouth 2 (two) times a day with meals      ONE TOUCH LANCETS MISC by Does not apply route      polyethylene glycol-propylene glycol (Systane) 0 4-0 3 % both eyes four times daily      RABEprazole (ACIPHEX) 20 MG tablet       simvastatin (ZOCOR) 20 mg tablet Take 1 tablet (20 mg total) by mouth daily at bedtime 90 tablet 3    VITAMIN E PO Take 180 mg by mouth daily       No current facility-administered medications for this visit  Objective:    /60 (BP Location: Left arm, Patient Position: Sitting, Cuff Size: Adult)   Pulse 96   Temp 97 6 °F (36 4 °C) (Temporal)   Resp 20   Ht 5' 3" (1 6 m)   Wt 67 1 kg (148 lb)   SpO2 97%   BMI 26 22 kg/m²        Physical Exam  Constitutional:       Appearance: She is well-developed  HENT:      Head: Normocephalic and atraumatic  Eyes:      General:         Right eye: No discharge  Left eye: No discharge  Conjunctiva/sclera: Conjunctivae normal       Pupils: Pupils are equal, round, and reactive to light  Neck:      Thyroid: No thyromegaly  Cardiovascular:      Rate and Rhythm: Normal rate and regular rhythm  Pulses: no weak pulses          Dorsalis pedis pulses are 1+ on the right side and 1+ on the left side  Posterior tibial pulses are 1+ on the right side and 1+ on the left side  Heart sounds: Normal heart sounds  No murmur heard  Pulmonary:      Effort: Pulmonary effort is normal  No respiratory distress  Breath sounds: Normal breath sounds  No wheezing or rales  Abdominal:      General: Bowel sounds are normal       Palpations: Abdomen is soft  Tenderness: There is no abdominal tenderness  Musculoskeletal:         General: No tenderness  Normal range of motion  Cervical back: Normal range of motion and neck supple  Comments: MILD DJD   Feet:      Right foot:      Skin integrity: No ulcer, skin breakdown, erythema, warmth, callus or dry skin  Left foot:      Skin integrity: No ulcer, skin breakdown, erythema, warmth, callus or dry skin  Lymphadenopathy:      Cervical: No cervical adenopathy  Skin:     General: Skin is warm and dry  Findings: No erythema or rash  Neurological:      Mental Status: She is alert and oriented to person, place, and time  Psychiatric:         Behavior: Behavior normal          Thought Content:  Thought content normal          Judgment: Judgment normal          Patient's shoes and socks removed  Right Foot/Ankle   Right Foot Inspection  Skin Exam: skin normal and skin intact no dry skin, no warmth, no callus, no erythema, no maceration, no abnormal color, no pre-ulcer, no ulcer and no callus                          Toe Exam: ROM and strength within normal limits  Sensory       Monofilament testing: intact  Vascular    The right DP pulse is 1+  The right PT pulse is 1+  Left Foot/Ankle  Left Foot Inspection  Skin Exam: skin normal and skin intactno dry skin, no warmth, no erythema, no maceration, normal color, no pre-ulcer, no ulcer and no callus                         Toe Exam: ROM and strength within normal limits                   Sensory       Monofilament: intact  Vascular    The left DP pulse is 1+  The left PT pulse is 1+  Assign Risk Category:  No deformity present; No loss of protective sensation;  No weak pulses       Risk: 0         Kemar Mena MD

## 2021-08-31 DIAGNOSIS — F41.9 ANXIETY: ICD-10-CM

## 2021-09-03 DIAGNOSIS — F41.9 ANXIETY: ICD-10-CM

## 2021-09-05 RX ORDER — ALPRAZOLAM 0.5 MG/1
TABLET ORAL
Qty: 30 TABLET | Refills: 0 | Status: SHIPPED | OUTPATIENT
Start: 2021-09-05 | End: 2021-10-04 | Stop reason: SDUPTHER

## 2021-09-05 RX ORDER — ALPRAZOLAM 0.5 MG/1
0.5 TABLET ORAL
Qty: 30 TABLET | Refills: 0 | OUTPATIENT
Start: 2021-09-05

## 2021-10-04 DIAGNOSIS — F41.9 ANXIETY: ICD-10-CM

## 2021-10-04 RX ORDER — ALPRAZOLAM 0.5 MG/1
0.5 TABLET ORAL
Qty: 30 TABLET | Refills: 0 | Status: SHIPPED | OUTPATIENT
Start: 2021-10-04 | End: 2021-11-01 | Stop reason: SDUPTHER

## 2021-11-01 DIAGNOSIS — F41.9 ANXIETY: ICD-10-CM

## 2021-11-02 RX ORDER — ALPRAZOLAM 0.5 MG/1
0.5 TABLET ORAL
Qty: 30 TABLET | Refills: 0 | Status: SHIPPED | OUTPATIENT
Start: 2021-11-02 | End: 2021-11-30 | Stop reason: SDUPTHER

## 2021-11-30 DIAGNOSIS — F41.9 ANXIETY: ICD-10-CM

## 2021-11-30 RX ORDER — ALPRAZOLAM 0.5 MG/1
0.5 TABLET ORAL
Qty: 30 TABLET | Refills: 0 | Status: SHIPPED | OUTPATIENT
Start: 2021-11-30 | End: 2021-12-30 | Stop reason: SDUPTHER

## 2021-12-14 DIAGNOSIS — Z00.00 MEDICARE ANNUAL WELLNESS VISIT, SUBSEQUENT: Primary | ICD-10-CM

## 2021-12-14 DIAGNOSIS — E11.9 TYPE 2 DIABETES MELLITUS WITHOUT COMPLICATION, WITHOUT LONG-TERM CURRENT USE OF INSULIN (HCC): ICD-10-CM

## 2021-12-14 DIAGNOSIS — Z13.29 SCREENING FOR THYROID DISORDER: ICD-10-CM

## 2021-12-14 DIAGNOSIS — E78.2 MIXED HYPERLIPIDEMIA: ICD-10-CM

## 2021-12-14 DIAGNOSIS — Z13.6 SCREENING FOR HYPERTENSION: ICD-10-CM

## 2021-12-18 DIAGNOSIS — E78.5 HYPERLIPIDEMIA, UNSPECIFIED HYPERLIPIDEMIA TYPE: ICD-10-CM

## 2021-12-20 RX ORDER — SIMVASTATIN 20 MG
TABLET ORAL
Qty: 90 TABLET | Refills: 3 | Status: SHIPPED | OUTPATIENT
Start: 2021-12-20

## 2021-12-21 ENCOUNTER — RA CDI HCC (OUTPATIENT)
Dept: OTHER | Facility: HOSPITAL | Age: 86
End: 2021-12-21

## 2021-12-27 RX ORDER — METOPROLOL SUCCINATE 25 MG/1
TABLET, EXTENDED RELEASE ORAL
COMMUNITY
Start: 2021-12-07 | End: 2022-05-26 | Stop reason: HOSPADM

## 2021-12-28 ENCOUNTER — OFFICE VISIT (OUTPATIENT)
Dept: FAMILY MEDICINE CLINIC | Facility: CLINIC | Age: 86
End: 2021-12-28
Payer: COMMERCIAL

## 2021-12-28 VITALS
SYSTOLIC BLOOD PRESSURE: 130 MMHG | WEIGHT: 150 LBS | HEART RATE: 71 BPM | OXYGEN SATURATION: 98 % | BODY MASS INDEX: 26.58 KG/M2 | TEMPERATURE: 97.7 F | HEIGHT: 63 IN | DIASTOLIC BLOOD PRESSURE: 70 MMHG

## 2021-12-28 DIAGNOSIS — N18.30 STAGE 3 CHRONIC KIDNEY DISEASE, UNSPECIFIED WHETHER STAGE 3A OR 3B CKD (HCC): ICD-10-CM

## 2021-12-28 DIAGNOSIS — F43.22 ADJUSTMENT REACTION WITH ANXIETY: ICD-10-CM

## 2021-12-28 DIAGNOSIS — E11.9 TYPE 2 DIABETES MELLITUS WITHOUT COMPLICATION, WITHOUT LONG-TERM CURRENT USE OF INSULIN (HCC): Primary | ICD-10-CM

## 2021-12-28 DIAGNOSIS — Z00.00 MEDICARE ANNUAL WELLNESS VISIT, SUBSEQUENT: ICD-10-CM

## 2021-12-28 DIAGNOSIS — L40.9 PSORIASIS: ICD-10-CM

## 2021-12-28 DIAGNOSIS — K57.90 DIVERTICULOSIS: ICD-10-CM

## 2021-12-28 DIAGNOSIS — E78.2 MIXED HYPERLIPIDEMIA: ICD-10-CM

## 2021-12-28 PROBLEM — R07.9 CHEST PAIN: Status: RESOLVED | Noted: 2021-05-13 | Resolved: 2021-12-28

## 2021-12-28 PROBLEM — Z76.89 ENCOUNTER FOR SUPPORT AND COORDINATION OF TRANSITION OF CARE: Status: RESOLVED | Noted: 2021-08-19 | Resolved: 2021-12-28

## 2021-12-28 PROBLEM — R00.0 TACHYCARDIA: Status: RESOLVED | Noted: 2021-05-13 | Resolved: 2021-12-28

## 2021-12-28 LAB
SL AMB  POCT GLUCOSE, UA: NORMAL
SL AMB LEUKOCYTE ESTERASE,UA: NORMAL
SL AMB POCT BILIRUBIN,UA: NORMAL
SL AMB POCT BLOOD,UA: NORMAL
SL AMB POCT CLARITY,UA: CLEAR
SL AMB POCT COLOR,UA: YELLOW
SL AMB POCT KETONES,UA: NORMAL
SL AMB POCT NITRITE,UA: NORMAL
SL AMB POCT PH,UA: 6
SL AMB POCT SPECIFIC GRAVITY,UA: 1.01
SL AMB POCT URINE PROTEIN: NORMAL
SL AMB POCT UROBILINOGEN: NORMAL

## 2021-12-28 PROCEDURE — 81003 URINALYSIS AUTO W/O SCOPE: CPT | Performed by: FAMILY MEDICINE

## 2021-12-28 PROCEDURE — 1170F FXNL STATUS ASSESSED: CPT | Performed by: FAMILY MEDICINE

## 2021-12-28 PROCEDURE — G0439 PPPS, SUBSEQ VISIT: HCPCS | Performed by: FAMILY MEDICINE

## 2021-12-28 PROCEDURE — 3725F SCREEN DEPRESSION PERFORMED: CPT | Performed by: FAMILY MEDICINE

## 2021-12-28 PROCEDURE — 3288F FALL RISK ASSESSMENT DOCD: CPT | Performed by: FAMILY MEDICINE

## 2021-12-28 PROCEDURE — 99215 OFFICE O/P EST HI 40 MIN: CPT | Performed by: FAMILY MEDICINE

## 2021-12-28 PROCEDURE — 1036F TOBACCO NON-USER: CPT | Performed by: FAMILY MEDICINE

## 2021-12-28 PROCEDURE — 1160F RVW MEDS BY RX/DR IN RCRD: CPT | Performed by: FAMILY MEDICINE

## 2021-12-28 PROCEDURE — 36415 COLL VENOUS BLD VENIPUNCTURE: CPT | Performed by: FAMILY MEDICINE

## 2021-12-28 PROCEDURE — 1125F AMNT PAIN NOTED PAIN PRSNT: CPT | Performed by: FAMILY MEDICINE

## 2021-12-29 LAB
ALBUMIN SERPL-MCNC: 4.4 G/DL (ref 3.6–5.1)
ALBUMIN/GLOB SERPL: 1.9 (CALC) (ref 1–2.5)
ALP SERPL-CCNC: 47 U/L (ref 37–153)
ALT SERPL-CCNC: 18 U/L (ref 6–29)
AST SERPL-CCNC: 21 U/L (ref 10–35)
BILIRUB SERPL-MCNC: 0.5 MG/DL (ref 0.2–1.2)
BUN SERPL-MCNC: 21 MG/DL (ref 7–25)
BUN/CREAT SERPL: 23 (CALC) (ref 6–22)
CALCIUM SERPL-MCNC: 9.8 MG/DL (ref 8.6–10.4)
CHLORIDE SERPL-SCNC: 102 MMOL/L (ref 98–110)
CHOLEST SERPL-MCNC: 184 MG/DL
CHOLEST/HDLC SERPL: 3.5 (CALC)
CO2 SERPL-SCNC: 26 MMOL/L (ref 20–32)
CREAT SERPL-MCNC: 0.91 MG/DL (ref 0.6–0.88)
GLOBULIN SER CALC-MCNC: 2.3 G/DL (CALC) (ref 1.9–3.7)
GLUCOSE SERPL-MCNC: 111 MG/DL (ref 65–99)
HBA1C MFR BLD: 6.1 % OF TOTAL HGB
HDLC SERPL-MCNC: 52 MG/DL
LDLC SERPL CALC-MCNC: 98 MG/DL (CALC)
MICROALBUMIN UR-MCNC: <0.2 MG/DL
NONHDLC SERPL-MCNC: 132 MG/DL (CALC)
POTASSIUM SERPL-SCNC: 4.5 MMOL/L (ref 3.5–5.3)
PROT SERPL-MCNC: 6.7 G/DL (ref 6.1–8.1)
SL AMB EGFR AFRICAN AMERICAN: 67 ML/MIN/1.73M2
SL AMB EGFR NON AFRICAN AMERICAN: 58 ML/MIN/1.73M2
SODIUM SERPL-SCNC: 138 MMOL/L (ref 135–146)
TRIGL SERPL-MCNC: 223 MG/DL
TSH SERPL-ACNC: 2.78 MIU/L (ref 0.4–4.5)

## 2021-12-30 DIAGNOSIS — F41.9 ANXIETY: ICD-10-CM

## 2021-12-30 RX ORDER — ALPRAZOLAM 0.5 MG/1
0.5 TABLET ORAL
Qty: 30 TABLET | Refills: 0 | Status: SHIPPED | OUTPATIENT
Start: 2021-12-30 | End: 2022-01-28 | Stop reason: SDUPTHER

## 2022-01-19 DIAGNOSIS — E11.9 TYPE 2 DIABETES MELLITUS WITHOUT COMPLICATION, WITHOUT LONG-TERM CURRENT USE OF INSULIN (HCC): ICD-10-CM

## 2022-01-19 RX ORDER — BLOOD SUGAR DIAGNOSTIC
1 STRIP MISCELLANEOUS 2 TIMES DAILY
Qty: 100 EACH | Refills: 4 | Status: SHIPPED | OUTPATIENT
Start: 2022-01-19

## 2022-01-19 RX ORDER — LANCETS 30 GAUGE
EACH MISCELLANEOUS
Qty: 100 EACH | Refills: 4 | Status: SHIPPED | OUTPATIENT
Start: 2022-01-19

## 2022-01-26 DIAGNOSIS — E11.9 TYPE 2 DIABETES MELLITUS WITHOUT COMPLICATION, WITHOUT LONG-TERM CURRENT USE OF INSULIN (HCC): ICD-10-CM

## 2022-01-28 DIAGNOSIS — F41.9 ANXIETY: ICD-10-CM

## 2022-01-28 RX ORDER — ALPRAZOLAM 0.5 MG/1
0.5 TABLET ORAL
Qty: 30 TABLET | Refills: 0 | Status: SHIPPED | OUTPATIENT
Start: 2022-01-28 | End: 2022-03-01 | Stop reason: SDUPTHER

## 2022-02-21 DIAGNOSIS — F32.A DEPRESSION, UNSPECIFIED DEPRESSION TYPE: ICD-10-CM

## 2022-02-21 RX ORDER — FLUOXETINE HYDROCHLORIDE 20 MG/1
20 CAPSULE ORAL DAILY
Qty: 90 CAPSULE | Refills: 3 | Status: SHIPPED | OUTPATIENT
Start: 2022-02-21

## 2022-03-01 DIAGNOSIS — F41.9 ANXIETY: ICD-10-CM

## 2022-03-01 RX ORDER — ALPRAZOLAM 0.5 MG/1
0.5 TABLET ORAL
Qty: 30 TABLET | Refills: 0 | Status: SHIPPED | OUTPATIENT
Start: 2022-03-01 | End: 2022-03-02 | Stop reason: SDUPTHER

## 2022-03-02 RX ORDER — ALPRAZOLAM 0.5 MG/1
0.5 TABLET ORAL
Qty: 30 TABLET | Refills: 0 | Status: SHIPPED | OUTPATIENT
Start: 2022-03-02 | End: 2022-03-30 | Stop reason: SDUPTHER

## 2022-03-02 NOTE — TELEPHONE ENCOUNTER
Requested medication(s) are due for refill today: Yes  Patient has already received a courtesy refill: No  Other reason request has been forwarded to provider: This refill cannot be delegated - previous eric shaw

## 2022-03-30 DIAGNOSIS — F41.9 ANXIETY: ICD-10-CM

## 2022-03-31 RX ORDER — ALPRAZOLAM 0.5 MG/1
0.5 TABLET ORAL
Qty: 30 TABLET | Refills: 0 | Status: SHIPPED | OUTPATIENT
Start: 2022-03-31 | End: 2022-04-28 | Stop reason: SDUPTHER

## 2022-04-28 DIAGNOSIS — F41.9 ANXIETY: ICD-10-CM

## 2022-04-30 RX ORDER — ALPRAZOLAM 0.5 MG/1
0.5 TABLET ORAL
Qty: 30 TABLET | Refills: 0 | Status: SHIPPED | OUTPATIENT
Start: 2022-04-30 | End: 2022-05-29 | Stop reason: SDUPTHER

## 2022-05-13 DIAGNOSIS — E11.9 TYPE 2 DIABETES MELLITUS WITHOUT COMPLICATION, WITHOUT LONG-TERM CURRENT USE OF INSULIN (HCC): ICD-10-CM

## 2022-05-13 DIAGNOSIS — N18.30 STAGE 3 CHRONIC KIDNEY DISEASE, UNSPECIFIED WHETHER STAGE 3A OR 3B CKD (HCC): ICD-10-CM

## 2022-05-13 DIAGNOSIS — E78.5 HYPERLIPIDEMIA, UNSPECIFIED HYPERLIPIDEMIA TYPE: ICD-10-CM

## 2022-05-13 DIAGNOSIS — E78.2 MIXED HYPERLIPIDEMIA: Primary | ICD-10-CM

## 2022-05-26 ENCOUNTER — OFFICE VISIT (OUTPATIENT)
Dept: FAMILY MEDICINE CLINIC | Facility: CLINIC | Age: 87
End: 2022-05-26
Payer: COMMERCIAL

## 2022-05-26 VITALS
OXYGEN SATURATION: 97 % | SYSTOLIC BLOOD PRESSURE: 136 MMHG | TEMPERATURE: 98.2 F | BODY MASS INDEX: 27.29 KG/M2 | WEIGHT: 154 LBS | DIASTOLIC BLOOD PRESSURE: 70 MMHG | HEIGHT: 63 IN | RESPIRATION RATE: 16 BRPM | HEART RATE: 84 BPM

## 2022-05-26 DIAGNOSIS — R07.81 RIB PAIN ON LEFT SIDE: ICD-10-CM

## 2022-05-26 DIAGNOSIS — S20.212D CONTUSION OF LEFT CHEST WALL, SUBSEQUENT ENCOUNTER: Primary | ICD-10-CM
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
## 2022-05-26 PROCEDURE — 3725F SCREEN DEPRESSION PERFORMED: CPT | Performed by: FAMILY MEDICINE

## 2022-05-26 PROCEDURE — 96372 THER/PROPH/DIAG INJ SC/IM: CPT | Performed by: FAMILY MEDICINE

## 2022-05-26 PROCEDURE — 1036F TOBACCO NON-USER: CPT | Performed by: FAMILY MEDICINE

## 2022-05-26 PROCEDURE — 1160F RVW MEDS BY RX/DR IN RCRD: CPT | Performed by: FAMILY MEDICINE

## 2022-05-26 PROCEDURE — 1003F LEVEL OF ACTIVITY ASSESS: CPT | Performed by: FAMILY MEDICINE

## 2022-05-26 PROCEDURE — 99214 OFFICE O/P EST MOD 30 MIN: CPT | Performed by: FAMILY MEDICINE

## 2022-05-26 RX ORDER — KETOROLAC TROMETHAMINE 30 MG/ML
30 INJECTION, SOLUTION INTRAMUSCULAR; INTRAVENOUS ONCE
Status: COMPLETED | OUTPATIENT
Start: 2022-05-26 | End: 2022-05-26

## 2022-05-26 RX ORDER — NAPROXEN 500 MG/1
500 TABLET ORAL 2 TIMES DAILY WITH MEALS
Qty: 20 TABLET | Refills: 0 | Status: SHIPPED | OUTPATIENT
Start: 2022-05-26

## 2022-05-26 RX ORDER — VITAMIN B COMPLEX
1 CAPSULE ORAL DAILY
COMMUNITY

## 2022-05-26 RX ORDER — TIZANIDINE HYDROCHLORIDE 2 MG/1
2 CAPSULE, GELATIN COATED ORAL 2 TIMES DAILY PRN
Qty: 20 CAPSULE | Refills: 0 | Status: SHIPPED | OUTPATIENT
Start: 2022-05-26

## 2022-05-26 RX ADMIN — KETOROLAC TROMETHAMINE 30 MG: 30 INJECTION, SOLUTION INTRAMUSCULAR; INTRAVENOUS at 14:56

## 2022-05-26 NOTE — PROGRESS NOTES
BMI Counseling: Body mass index is 27 28 kg/m²  The BMI is above normal  Nutrition recommendations include encouraging healthy choices of fruits and vegetables and moderation in carbohydrate intake  Exercise recommendations include exercising 3-5 times per week  No pharmacotherapy was ordered  Rationale for BMI follow-up plan is due to patient being overweight or obese  Assessment/Plan:    No problem-specific Assessment & Plan notes found for this encounter  Diagnoses and all orders for this visit:    Contusion of left chest wall, subsequent encounter  -     ketorolac (TORADOL) 60 mg/2 mL IM injection 30 mg  -     naproxen (Naprosyn) 500 mg tablet; Take 1 tablet (500 mg total) by mouth 2 (two) times a day with meals  -     TiZANidine (ZANAFLEX) 2 MG capsule; Take 1 capsule (2 mg total) by mouth 2 (two) times a day as needed for muscle spasms    Rib pain on left side  -     ketorolac (TORADOL) 60 mg/2 mL IM injection 30 mg  -     naproxen (Naprosyn) 500 mg tablet; Take 1 tablet (500 mg total) by mouth 2 (two) times a day with meals  -     TiZANidine (ZANAFLEX) 2 MG capsule; Take 1 capsule (2 mg total) by mouth 2 (two) times a day as needed for muscle spasms    Fall, subsequent encounter    Other orders  -     b complex vitamins capsule; Take 1 capsule by mouth daily        Patient Instructions   REST  AVOID LIFTING OR PUSHING    ALTERNATE COOL AND WARM COMPRESS TO AREA OF PAIN  MEDICATION AS DIRECTED    CALL Tuesday WITH UPDATE  CALL SOONER IF SYMPTOMS WORSEN        Return in about 2 weeks (around 6/9/2022) for Recheck  Subjective:      Patient ID: Annmarie Triana is a 82 y o  female  Chief Complaint   Patient presents with    Follow-up     Pt here for an ED f/u for a fall           PATIENT IS S/P ER VISIT AFTER SUSTAINING A FALL IN HER HOUSE  R KNEE GAVE OUT ON HER  FELL IN TABLE ONTO L SIDE  DENIES ANY LOC  WENT TO THE ER FOR AN EVALUATION  X RAY AND CT WERE NEGATIVE    PATIENT CONTINUES TO C/O L CHEST PAIN  SL PLEURITIC  DENIES ANY , PALPITATIONS  NO NVD  NO COUGH    DISCUSSED ER FINDINGS AND PAIN MANAGEMENT OPTIONS      The following portions of the patient's history were reviewed and updated as appropriate: allergies, current medications, past family history, past medical history, past social history, past surgical history and problem list     Review of Systems   Constitutional: Positive for fatigue  Negative for chills and fever  HENT: Negative for congestion, ear discharge, ear pain, mouth sores, postnasal drip, sore throat and trouble swallowing  Eyes: Negative for pain, discharge and visual disturbance  Respiratory: Negative for cough, shortness of breath and wheezing  Cardiovascular: Positive for chest pain  Negative for palpitations and leg swelling  Gastrointestinal: Negative for abdominal distention, abdominal pain, blood in stool, diarrhea, nausea, rectal pain and vomiting  Endocrine: Negative for polydipsia, polyphagia and polyuria  Genitourinary: Negative for dysuria, frequency, hematuria and urgency  Musculoskeletal: Positive for arthralgias and gait problem  Negative for joint swelling  Skin: Negative for pallor and rash  Neurological: Negative for dizziness, syncope, speech difficulty, weakness, light-headedness, numbness and headaches  Hematological: Negative for adenopathy  Psychiatric/Behavioral: Negative for behavioral problems, confusion and sleep disturbance  The patient is not nervous/anxious            Current Outpatient Medications   Medication Sig Dispense Refill    ALPRAZolam (XANAX) 0 5 mg tablet Take 1 tablet (0 5 mg total) by mouth daily at bedtime 30 tablet 0    ascorbic acid (VITAMIN C) 250 mg tablet Take 500 mg by mouth daily      b complex vitamins capsule Take 1 capsule by mouth daily      fish oil-omega-3 fatty acids 1000 MG capsule Take by mouth      FLUoxetine (PROzac) 20 mg capsule Take 1 capsule (20 mg total) by mouth daily 90 capsule 3    glucose blood (OneTouch Ultra) test strip Use 1 each 2 (two) times a day Test 100 each 4    Lactobacillus (ACIDOPHILUS PO) Take by mouth daily      Lancets (OneTouch Delica Plus [id number]) MISC TEST TWO TIMES A DAY BEFORE BREAKFAST AND BEFORE DINNER  USE AS INSTRUCTED  100 each 4    metFORMIN (GLUCOPHAGE) 500 mg tablet Take 1 tablet (500 mg total) by mouth 2 (two) times a day 180 tablet 3    metoprolol tartrate (LOPRESSOR) 25 mg tablet 25 mg every 12 (twelve) hours      Misc Natural Products (LUTEIN 20 PO) Take by mouth daily      Multiple Vitamins-Minerals (CENTRUM SILVER 50+WOMEN PO) Take by mouth daily      naproxen (Naprosyn) 500 mg tablet Take 1 tablet (500 mg total) by mouth 2 (two) times a day with meals 20 tablet 0    naproxen sodium (ALEVE) 220 MG tablet Take 220 mg by mouth 2 (two) times a day with meals      ONE TOUCH LANCETS MISC by Does not apply route      polyethylene glycol-propylene glycol (Systane) 0 4-0 3 % both eyes four times daily      RABEprazole (ACIPHEX) 20 MG tablet       simvastatin (ZOCOR) 20 mg tablet TAKE 1 TABLET BY MOUTH  DAILY AT BEDTIME 90 tablet 3    TiZANidine (ZANAFLEX) 2 MG capsule Take 1 capsule (2 mg total) by mouth 2 (two) times a day as needed for muscle spasms 20 capsule 0    VITAMIN E PO Take 180 mg by mouth daily       No current facility-administered medications for this visit  Objective:    /70 (BP Location: Left arm, Patient Position: Sitting, Cuff Size: Adult)   Pulse 84   Temp 98 2 °F (36 8 °C) (Temporal)   Resp 16   Ht 5' 3" (1 6 m)   Wt 69 9 kg (154 lb)   SpO2 97%   BMI 27 28 kg/m²        Physical Exam  Constitutional:       Appearance: She is well-developed  HENT:      Head: Normocephalic and atraumatic  Eyes:      General:         Right eye: No discharge  Left eye: No discharge  Conjunctiva/sclera: Conjunctivae normal       Pupils: Pupils are equal, round, and reactive to light     Neck:      Thyroid: No thyromegaly  Cardiovascular:      Rate and Rhythm: Normal rate and regular rhythm  Heart sounds: Murmur heard  Pulmonary:      Effort: Pulmonary effort is normal  No respiratory distress  Breath sounds: Normal breath sounds  No wheezing or rales  Comments: COARSE BS  MARKED CHEST WALL / RIB TENDERNESS ON L  NO INSTABILITY NOTED  Chest:      Chest wall: Tenderness present  Abdominal:      General: Bowel sounds are normal       Palpations: Abdomen is soft  Tenderness: There is no abdominal tenderness  Musculoskeletal:         General: Tenderness present  Cervical back: Normal range of motion and neck supple  No rigidity or tenderness  Comments: MARKED DJD CHANGES  UNSTEADY GAIT   Lymphadenopathy:      Cervical: No cervical adenopathy  Skin:     General: Skin is warm and dry  Findings: No erythema or rash  Neurological:      General: No focal deficit present  Mental Status: She is alert and oriented to person, place, and time  Psychiatric:         Mood and Affect: Mood normal          Behavior: Behavior normal          Thought Content:  Thought content normal          Judgment: Judgment normal                 Endy Saleh MD

## 2022-05-26 NOTE — PATIENT INSTRUCTIONS
REST  AVOID LIFTING OR PUSHING    ALTERNATE COOL AND WARM COMPRESS TO AREA OF PAIN  MEDICATION AS DIRECTED    CALL Tuesday WITH UPDATE  CALL SOONER IF SYMPTOMS WORSEN

## 2022-05-27 ENCOUNTER — TELEPHONE (OUTPATIENT)
Dept: FAMILY MEDICINE CLINIC | Facility: CLINIC | Age: 87
End: 2022-05-27

## 2022-05-27 NOTE — TELEPHONE ENCOUNTER
Aileen's daughter Chelo Carternoy called stating she read Pat's After Visit Summary  The paperwork said to stop taking Metoprolol, calcium, and gaviscon  Is that because they were duplicates? She is not supposed to stop anything? Chelo Brannon [phone]

## 2022-05-29 DIAGNOSIS — F41.9 ANXIETY: ICD-10-CM

## 2022-06-01 ENCOUNTER — RA CDI HCC (OUTPATIENT)
Dept: OTHER | Facility: HOSPITAL | Age: 87
End: 2022-06-01

## 2022-06-01 RX ORDER — ALPRAZOLAM 0.5 MG/1
0.5 TABLET ORAL
Qty: 30 TABLET | Refills: 0 | Status: SHIPPED | OUTPATIENT
Start: 2022-06-01 | End: 2022-06-28 | Stop reason: SDUPTHER

## 2022-06-01 NOTE — PROGRESS NOTES
[facility]  coding opportunities          Chart Reviewed number of suggestions sent to Provider: 3   E11 22, N18 31  I47 1 see 8/12/21 vascular consult in media tab    Patients Insurance     Medicare Insurance: [address] [place] [id number]

## 2022-06-15 LAB
ALBUMIN SERPL-MCNC: 4 G/DL (ref 3.6–5.1)
ALBUMIN/GLOB SERPL: 1.7 (CALC) (ref 1–2.5)
ALP SERPL-CCNC: 50 U/L (ref 37–153)
ALT SERPL-CCNC: 15 U/L (ref 6–29)
AST SERPL-CCNC: 21 U/L (ref 10–35)
BILIRUB SERPL-MCNC: 0.6 MG/DL (ref 0.2–1.2)
BUN SERPL-MCNC: 15 MG/DL (ref 7–25)
BUN/CREAT SERPL: 16 (CALC) (ref 6–22)
CALCIUM SERPL-MCNC: 9.4 MG/DL (ref 8.6–10.4)
CHLORIDE SERPL-SCNC: 101 MMOL/L (ref 98–110)
CHOLEST SERPL-MCNC: 175 MG/DL
CHOLEST/HDLC SERPL: 3.1 (CALC)
CO2 SERPL-SCNC: 30 MMOL/L (ref 20–32)
CREAT SERPL-MCNC: 0.92 MG/DL (ref 0.6–0.88)
GLOBULIN SER CALC-MCNC: 2.4 G/DL (CALC) (ref 1.9–3.7)
GLUCOSE SERPL-MCNC: 114 MG/DL (ref 65–99)
HDLC SERPL-MCNC: 56 MG/DL
LDLC SERPL CALC-MCNC: 96 MG/DL (CALC)
NONHDLC SERPL-MCNC: 119 MG/DL (CALC)
POTASSIUM SERPL-SCNC: 4.4 MMOL/L (ref 3.5–5.3)
PROT SERPL-MCNC: 6.4 G/DL (ref 6.1–8.1)
SL AMB EGFR AFRICAN AMERICAN: 66 ML/MIN/1.73M2
SL AMB EGFR NON AFRICAN AMERICAN: 57 ML/MIN/1.73M2
SODIUM SERPL-SCNC: 137 MMOL/L (ref 135–146)
TRIGL SERPL-MCNC: 132 MG/DL

## 2022-06-16 RX ORDER — METOPROLOL SUCCINATE 25 MG/1
50 TABLET, EXTENDED RELEASE ORAL DAILY
COMMUNITY
Start: 2022-05-27

## 2022-06-20 ENCOUNTER — OFFICE VISIT (OUTPATIENT)
Dept: FAMILY MEDICINE CLINIC | Facility: CLINIC | Age: 87
End: 2022-06-20
Payer: COMMERCIAL

## 2022-06-20 VITALS
HEIGHT: 63 IN | HEART RATE: 70 BPM | TEMPERATURE: 97.3 F | OXYGEN SATURATION: 98 % | BODY MASS INDEX: 26.58 KG/M2 | SYSTOLIC BLOOD PRESSURE: 128 MMHG | WEIGHT: 150 LBS | DIASTOLIC BLOOD PRESSURE: 60 MMHG | RESPIRATION RATE: 14 BRPM

## 2022-06-20 DIAGNOSIS — L40.9 PSORIASIS: ICD-10-CM

## 2022-06-20 DIAGNOSIS — N18.30 STAGE 3 CHRONIC KIDNEY DISEASE, UNSPECIFIED WHETHER STAGE 3A OR 3B CKD (HCC): ICD-10-CM

## 2022-06-20 DIAGNOSIS — E78.2 MIXED HYPERLIPIDEMIA: ICD-10-CM

## 2022-06-20 DIAGNOSIS — E11.9 TYPE 2 DIABETES MELLITUS WITHOUT COMPLICATION, WITHOUT LONG-TERM CURRENT USE OF INSULIN (HCC): Primary | ICD-10-CM

## 2022-06-20 DIAGNOSIS — K57.90 DIVERTICULOSIS: ICD-10-CM

## 2022-06-20 PROCEDURE — 1160F RVW MEDS BY RX/DR IN RCRD: CPT | Performed by: FAMILY MEDICINE

## 2022-06-20 PROCEDURE — 99214 OFFICE O/P EST MOD 30 MIN: CPT | Performed by: FAMILY MEDICINE

## 2022-06-20 PROCEDURE — 1036F TOBACCO NON-USER: CPT | Performed by: FAMILY MEDICINE

## 2022-06-20 NOTE — PROGRESS NOTES
Assessment/Plan:    Type 2 diabetes mellitus without complication, without long-term current use of insulin (HCC)    Lab Results   Component Value Date    HGBA1C 6 1 (H) 12/28/2021       COMPLIANT WITH MEDICATION  DENIES ANY NUMBNESS, TINGLING IN FEET    - DISCUSSED DIETARY MODIFICATION OF CARBOHYDRATES  - HGB A1C AND URINE STUDIES DUE TODAY  - FOOT CARE  - RV 3 MONTHS        Hyperlipidemia  WATCHING CHOLESTEROL INTAKE  COMPLIANT WITH MEDICATION  NO CONCERNS    - CONTINUE TO MONITOR DIETARY CHOL INTAKE  - CONTINUE CURRENT MEDICATION  - ENCOURAGED PHYSICAL ACTIVITY        Diverticulosis  STABLE       Diagnoses and all orders for this visit:    Type 2 diabetes mellitus without complication, without long-term current use of insulin (HCC)  -     Cancel: POCT hemoglobin A1c    Mixed hyperlipidemia    Psoriasis    Stage 3 chronic kidney disease, unspecified whether stage 3a or 3b CKD ([facility] [address] )    Diverticulosis        Patient Instructions   CONTINUE CURRENT TREATMENT PLAN  MONITOR DIETARY SODIUM, CHOL, AND CARBOHYDRATE INTAKE  ENCOURAGE PHYSICAL ACTIVITY  FOOT CARE    RV 3 M, SOONER PRN      Return in about 6 months (around 12/20/2022) for Annual physical     Subjective:      Patient ID: Alberto Cabrales is a 82 y o  female  Chief Complaint   Patient presents with    Follow-up       PATIENT RETURNS FOR ROUTINE EVALUATION OF PATIENT'S MEDICAL ISSUES    INDIVIDUAL MEDICAL ISSUES WITH THEIR CURRENT STATUS, ASSESSMENT AND PLANS ARE LISTED ABOVE          The following portions of the patient's history were reviewed and updated as appropriate: allergies, current medications, past family history, past medical history, past social history, past surgical history and problem list     Review of Systems   Constitutional: Negative for chills, fatigue and fever  HENT: Negative for congestion, ear discharge, ear pain, mouth sores, postnasal drip, sore throat and trouble swallowing      Eyes: Negative for pain, discharge and visual disturbance  Respiratory: Negative for cough, shortness of breath and wheezing  Cardiovascular: Negative for chest pain, palpitations and leg swelling  Gastrointestinal: Negative for abdominal distention, abdominal pain, blood in stool, diarrhea and nausea  Endocrine: Negative for polydipsia, polyphagia and polyuria  Genitourinary: Negative for dysuria, frequency, hematuria and urgency  Musculoskeletal: Positive for arthralgias  Negative for gait problem and joint swelling  Skin: Negative for pallor and rash  Neurological: Negative for dizziness, syncope, speech difficulty, weakness, light-headedness, numbness and headaches  Hematological: Negative for adenopathy  Psychiatric/Behavioral: Negative for behavioral problems, confusion and sleep disturbance  The patient is not nervous/anxious  Current Outpatient Medications   Medication Sig Dispense Refill    ALPRAZolam (XANAX) 0 5 mg tablet Take 1 tablet (0 5 mg total) by mouth daily at bedtime 30 tablet 0    ascorbic acid (VITAMIN C) 250 mg tablet Take 500 mg by mouth daily      b complex vitamins capsule Take 1 capsule by mouth daily      fish oil-omega-3 fatty acids 1000 MG capsule Take by mouth      FLUoxetine (PROzac) 20 mg capsule Take 1 capsule (20 mg total) by mouth daily 90 capsule 3    glucose blood (OneTouch Ultra) test strip Use 1 each 2 (two) times a day Test 100 each 4    Lactobacillus (ACIDOPHILUS PO) Take by mouth daily      Lancets (OneTouch Delica Plus [id number]) MISC TEST TWO TIMES A DAY BEFORE BREAKFAST AND BEFORE DINNER   USE AS INSTRUCTED  100 each 4    metFORMIN (GLUCOPHAGE) 500 mg tablet Take 1 tablet (500 mg total) by mouth 2 (two) times a day 180 tablet 3    metoprolol succinate (TOPROL-XL) 25 mg 24 hr tablet 25 mg 2 (two) times a day      Multiple Vitamins-Minerals (CENTRUM SILVER 50+WOMEN PO) Take by mouth daily      ONE TOUCH LANCETS MISC by Does not apply route      polyethylene glycol-propylene glycol (Systane) 0 4-0 3 % both eyes four times daily      RABEprazole (ACIPHEX) 20 MG tablet       simvastatin (ZOCOR) 20 mg tablet TAKE 1 TABLET BY MOUTH  DAILY AT BEDTIME 90 tablet 3    VITAMIN E PO Take 180 mg by mouth daily      metoprolol tartrate (LOPRESSOR) 25 mg tablet 25 mg every 12 (twelve) hours (Patient not taking: Reported on 6/20/2022)      Misc Natural Products (LUTEIN 20 PO) Take by mouth daily      naproxen (Naprosyn) 500 mg tablet Take 1 tablet (500 mg total) by mouth 2 (two) times a day with meals (Patient not taking: Reported on 6/20/2022) 20 tablet 0    naproxen sodium (ALEVE) 220 MG tablet Take 220 mg by mouth 2 (two) times a day with meals (Patient not taking: Reported on 6/20/2022)      TiZANidine (ZANAFLEX) 2 MG capsule Take 1 capsule (2 mg total) by mouth 2 (two) times a day as needed for muscle spasms (Patient not taking: Reported on 6/20/2022) 20 capsule 0     No current facility-administered medications for this visit  Objective:    /60   Pulse 70   Temp (!) 97 3 °F (36 3 °C) (Temporal)   Resp 14   Ht 5' 3" (1 6 m)   Wt 68 kg (150 lb)   SpO2 98%   BMI 26 57 kg/m²        Physical Exam  Constitutional:       Appearance: She is well-developed  HENT:      Head: Normocephalic and atraumatic  Eyes:      General:         Right eye: No discharge  Left eye: No discharge  Conjunctiva/sclera: Conjunctivae normal       Pupils: Pupils are equal, round, and reactive to light  Neck:      Thyroid: No thyromegaly  Cardiovascular:      Rate and Rhythm: Normal rate and regular rhythm  Pulses: Pulses are weak  Dorsalis pedis pulses are 0 on the right side and 0 on the left side  Posterior tibial pulses are 1+ on the right side and 1+ on the left side  Heart sounds: Normal heart sounds  No murmur heard  Pulmonary:      Effort: Pulmonary effort is normal  No respiratory distress  Breath sounds: Normal breath sounds   No wheezing or rales  Abdominal:      General: Bowel sounds are normal       Palpations: Abdomen is soft  Tenderness: There is no abdominal tenderness  Musculoskeletal:         General: No tenderness  Normal range of motion  Cervical back: Normal range of motion and neck supple  Feet:      Right foot:      Skin integrity: No ulcer, skin breakdown, erythema, warmth, callus or dry skin  Left foot:      Skin integrity: No ulcer, skin breakdown, erythema, warmth, callus or dry skin  Lymphadenopathy:      Cervical: No cervical adenopathy  Skin:     General: Skin is warm and dry  Findings: No erythema or rash  Neurological:      Mental Status: She is alert and oriented to person, place, and time  Psychiatric:         Behavior: Behavior normal          Thought Content: Thought content normal          Judgment: Judgment normal          Patient's shoes and socks removed  Right Foot/Ankle   Right Foot Inspection  Skin Exam: skin normal and skin intact  No dry skin, no warmth, no callus, no erythema, no maceration, no abnormal color, no pre-ulcer, no ulcer and no callus  Toe Exam: ROM and strength within normal limits  Sensory   Monofilament testing: intact    Vascular  The right DP pulse is 0  The right PT pulse is 1+  Left Foot/Ankle  Left Foot Inspection  Skin Exam: skin normal and skin intact  No dry skin, no warmth, no erythema, no maceration, normal color, no pre-ulcer, no ulcer and no callus  Toe Exam: ROM and strength within normal limits  Sensory   Monofilament testing: intact    Vascular  The left DP pulse is 0  The left PT pulse is 1+       Assign Risk Category  No deformity present  No loss of protective sensation  Weak pulses  Risk: 1         Wesley Santana MD

## 2022-06-20 NOTE — ASSESSMENT & PLAN NOTE
Lab Results   Component Value Date    HGBA1C 6 1 (H) 12/28/2021       COMPLIANT WITH MEDICATION  DENIES ANY NUMBNESS, TINGLING IN FEET    - DISCUSSED DIETARY MODIFICATION OF CARBOHYDRATES  - HGB A1C AND URINE STUDIES DUE TODAY  - FOOT CARE  - RV 3 MONTHS

## 2022-06-25 DIAGNOSIS — F41.9 ANXIETY: ICD-10-CM

## 2022-06-25 NOTE — TELEPHONE ENCOUNTER
Medication Refill Request     ALPRAZolam (XANAX) 0 5 mg tablet    Take 1 tablet (0 5 mg total) by mouth daily at bedtime   Dispense: 30 tablet       Verified pharmacy   [x]  Verified ordering Provider   [x]  Verified enough for 3 days  [x]

## 2022-06-27 RX ORDER — ALPRAZOLAM 0.5 MG/1
0.5 TABLET ORAL
Qty: 30 TABLET | Refills: 0 | Status: CANCELLED | OUTPATIENT
Start: 2022-06-27

## 2022-06-28 DIAGNOSIS — F41.9 ANXIETY: ICD-10-CM

## 2022-06-28 RX ORDER — ALPRAZOLAM 0.5 MG/1
0.5 TABLET ORAL
Qty: 30 TABLET | Refills: 0 | Status: SHIPPED | OUTPATIENT
Start: 2022-06-28 | End: 2022-07-28 | Stop reason: SDUPTHER

## 2022-06-28 RX ORDER — ALPRAZOLAM 0.5 MG/1
0.5 TABLET ORAL
Qty: 30 TABLET | Refills: 0 | Status: CANCELLED | OUTPATIENT
Start: 2022-06-28

## 2022-07-28 DIAGNOSIS — F41.9 ANXIETY: ICD-10-CM

## 2022-07-29 RX ORDER — ALPRAZOLAM 0.5 MG/1
0.5 TABLET ORAL
Qty: 30 TABLET | Refills: 0 | Status: SHIPPED | OUTPATIENT
Start: 2022-07-29 | End: 2022-08-24 | Stop reason: SDUPTHER

## 2022-08-05 ENCOUNTER — PROCEDURE VISIT (OUTPATIENT)
Dept: FAMILY MEDICINE CLINIC | Facility: CLINIC | Age: 87
End: 2022-08-05
Payer: COMMERCIAL

## 2022-08-05 VITALS
SYSTOLIC BLOOD PRESSURE: 126 MMHG | DIASTOLIC BLOOD PRESSURE: 74 MMHG | TEMPERATURE: 98.2 F | OXYGEN SATURATION: 99 % | HEART RATE: 76 BPM | RESPIRATION RATE: 16 BRPM | HEIGHT: 63 IN | WEIGHT: 150 LBS | BODY MASS INDEX: 26.58 KG/M2

## 2022-08-05 DIAGNOSIS — L57.0 ACTINIC KERATOSIS: Primary | ICD-10-CM

## 2022-08-05 PROCEDURE — 11400 EXC TR-EXT B9+MARG 0.5 CM<: CPT | Performed by: FAMILY MEDICINE

## 2022-08-05 PROCEDURE — 99213 OFFICE O/P EST LOW 20 MIN: CPT | Performed by: FAMILY MEDICINE

## 2022-08-05 PROCEDURE — 1160F RVW MEDS BY RX/DR IN RCRD: CPT | Performed by: FAMILY MEDICINE

## 2022-08-05 NOTE — PROGRESS NOTES
Assessment/Plan:    No problem-specific Assessment & Plan notes found for this encounter  Diagnoses and all orders for this visit:    Actinic keratosis        Patient Instructions   KEEP AREA CLEAN AND DRY   WATCH FOR SIGNS OF INFECTION    RV PRN        Return in about 3 months (around 11/5/2022) for Recheck  Subjective:      Patient ID: Enrique Wyatt is a 82 y o  female  Chief Complaint   Patient presents with    remove lesion on left  jaw        PATIENT RETURNS  CONCERNED ABOUT SKIN LESION ON FACE  GETTING BIGGER  NO BLEEDING      The following portions of the patient's history were reviewed and updated as appropriate: allergies, current medications, past family history, past medical history, past social history, past surgical history and problem list     Review of Systems   Constitutional: Negative for fever  HENT: Negative for congestion and sore throat  Eyes: Negative for discharge  Respiratory: Negative for chest tightness  Cardiovascular: Negative for chest pain and palpitations  Gastrointestinal: Negative for abdominal pain, diarrhea, nausea and vomiting  Musculoskeletal: Negative for arthralgias and joint swelling  Neurological: Negative for numbness  Psychiatric/Behavioral: The patient is not nervous/anxious            Current Outpatient Medications   Medication Sig Dispense Refill    ALPRAZolam (XANAX) 0 5 mg tablet Take 1 tablet (0 5 mg total) by mouth daily at bedtime 30 tablet 0    ascorbic acid (VITAMIN C) 250 mg tablet Take 500 mg by mouth daily      b complex vitamins capsule Take 1 capsule by mouth daily      fish oil-omega-3 fatty acids 1000 MG capsule Take by mouth      FLUoxetine (PROzac) 20 mg capsule Take 1 capsule (20 mg total) by mouth daily 90 capsule 3    glucose blood (OneTouch Ultra) test strip Use 1 each 2 (two) times a day Test 100 each 4    Lactobacillus (ACIDOPHILUS PO) Take by mouth daily      Lancets (OneTouch Delica Plus [id number]) MISC TEST TWO TIMES A DAY BEFORE BREAKFAST AND BEFORE DINNER  USE AS INSTRUCTED  100 each 4    metFORMIN (GLUCOPHAGE) 500 mg tablet Take 1 tablet (500 mg total) by mouth 2 (two) times a day 180 tablet 3    metoprolol succinate (TOPROL-XL) 25 mg 24 hr tablet 25 mg 2 (two) times a day      Misc Natural Products (LUTEIN 20 PO) Take by mouth daily      Multiple Vitamins-Minerals (CENTRUM SILVER 50+WOMEN PO) Take by mouth daily      ONE TOUCH LANCETS MISC by Does not apply route      polyethylene glycol-propylene glycol (Systane) 0 4-0 3 % both eyes four times daily      RABEprazole (ACIPHEX) 20 MG tablet       simvastatin (ZOCOR) 20 mg tablet TAKE 1 TABLET BY MOUTH  DAILY AT BEDTIME 90 tablet 3    VITAMIN E PO Take 180 mg by mouth daily      metoprolol tartrate (LOPRESSOR) 25 mg tablet 25 mg every 12 (twelve) hours (Patient not taking: No sig reported)      naproxen (Naprosyn) 500 mg tablet Take 1 tablet (500 mg total) by mouth 2 (two) times a day with meals (Patient not taking: No sig reported) 20 tablet 0    naproxen sodium (ALEVE) 220 MG tablet Take 220 mg by mouth 2 (two) times a day with meals (Patient not taking: No sig reported)      TiZANidine (ZANAFLEX) 2 MG capsule Take 1 capsule (2 mg total) by mouth 2 (two) times a day as needed for muscle spasms (Patient not taking: No sig reported) 20 capsule 0     No current facility-administered medications for this visit         Objective:    /74   Pulse 76   Temp 98 2 °F (36 8 °C) (Temporal)   Resp 16   Ht 5' 3" (1 6 m)   Wt 68 kg (150 lb)   SpO2 99%   BMI 26 57 kg/m²        Physical Exam       0 5 CM LESION L JAW   HYPERKERATOTIC  CONSISTENT WITH AN ACTINIC KERATOSIS VS SCC    Cindy Posadas MD

## 2022-08-05 NOTE — PROGRESS NOTES
Procedures     PROCEDURE DETAILS WERE DISCUSSED    VERBAL CONSENT WAS OBTAINED    AREA WAS PREPPED  SKIN INFILTRATED WITH 1% LIDOCAINE WITH EPI    LESION WAS REMOVED VIA CURETTAGE    AREA WAS CAUTERIZED    MINIMAL BLEEDING  PATIENT TOLERATED PROCEDURE WELL

## 2022-08-12 LAB
CLINICAL INFO: NORMAL
SPECIMEN SOURCE: NORMAL

## 2022-08-24 DIAGNOSIS — F41.9 ANXIETY: ICD-10-CM

## 2022-08-25 RX ORDER — ALPRAZOLAM 0.5 MG/1
0.5 TABLET ORAL
Qty: 30 TABLET | Refills: 0 | Status: SHIPPED | OUTPATIENT
Start: 2022-08-25 | End: 2022-09-27 | Stop reason: SDUPTHER

## 2022-09-08 DIAGNOSIS — E11.9 TYPE 2 DIABETES MELLITUS WITHOUT COMPLICATION, WITHOUT LONG-TERM CURRENT USE OF INSULIN (HCC): ICD-10-CM

## 2022-09-08 RX ORDER — LANCETS 30 GAUGE
EACH MISCELLANEOUS
Qty: 100 EACH | Refills: 4 | Status: SHIPPED | OUTPATIENT
Start: 2022-09-08

## 2022-09-08 RX ORDER — BLOOD SUGAR DIAGNOSTIC
STRIP MISCELLANEOUS
Qty: 100 STRIP | Refills: 4 | Status: SHIPPED | OUTPATIENT
Start: 2022-09-08

## 2022-09-27 DIAGNOSIS — F41.9 ANXIETY: ICD-10-CM

## 2022-09-27 RX ORDER — ALPRAZOLAM 0.5 MG/1
0.5 TABLET ORAL
Qty: 30 TABLET | Refills: 0 | Status: SHIPPED | OUTPATIENT
Start: 2022-09-27 | End: 2022-10-26 | Stop reason: SDUPTHER

## 2022-10-12 PROBLEM — Z00.00 MEDICARE ANNUAL WELLNESS VISIT, SUBSEQUENT: Status: RESOLVED | Noted: 2021-12-28 | Resolved: 2022-10-12

## 2022-10-26 DIAGNOSIS — F41.9 ANXIETY: ICD-10-CM

## 2022-10-26 RX ORDER — ALPRAZOLAM 0.5 MG/1
0.5 TABLET ORAL
Qty: 30 TABLET | Refills: 0 | Status: SHIPPED | OUTPATIENT
Start: 2022-10-26

## 2022-11-19 DIAGNOSIS — E11.9 TYPE 2 DIABETES MELLITUS WITHOUT COMPLICATION, WITHOUT LONG-TERM CURRENT USE OF INSULIN (HCC): ICD-10-CM

## 2022-11-22 LAB
LEFT EYE DIABETIC RETINOPATHY: POSITIVE
RIGHT EYE DIABETIC RETINOPATHY: POSITIVE

## 2022-11-22 NOTE — TELEPHONE ENCOUNTER
Requested medication(s) are due for refill today: Yes  Patient has already received a courtesy refill: No  Other reason request has been forwarded to provider: Due to refill protocol, pt is due for a A1C  Please order along with any other labs you want

## 2022-11-25 DIAGNOSIS — F41.9 ANXIETY: ICD-10-CM

## 2022-11-25 RX ORDER — ALPRAZOLAM 0.5 MG/1
0.5 TABLET ORAL
Qty: 30 TABLET | Refills: 0 | Status: SHIPPED | OUTPATIENT
Start: 2022-11-25

## 2022-12-08 DIAGNOSIS — F32.A DEPRESSION, UNSPECIFIED DEPRESSION TYPE: ICD-10-CM

## 2022-12-09 RX ORDER — FLUOXETINE HYDROCHLORIDE 20 MG/1
CAPSULE ORAL
Qty: 90 CAPSULE | Refills: 3 | Status: SHIPPED | OUTPATIENT
Start: 2022-12-09

## 2022-12-22 DIAGNOSIS — F41.9 ANXIETY: ICD-10-CM

## 2022-12-22 RX ORDER — ALPRAZOLAM 0.5 MG/1
0.5 TABLET ORAL
Qty: 30 TABLET | Refills: 0 | Status: SHIPPED | OUTPATIENT
Start: 2022-12-22

## 2023-01-24 DIAGNOSIS — F41.9 ANXIETY: ICD-10-CM

## 2023-01-24 RX ORDER — ALPRAZOLAM 0.5 MG/1
0.5 TABLET ORAL
Qty: 30 TABLET | Refills: 0 | Status: SHIPPED | OUTPATIENT
Start: 2023-01-24

## 2023-02-23 DIAGNOSIS — F41.9 ANXIETY: ICD-10-CM

## 2023-02-23 RX ORDER — ALPRAZOLAM 0.5 MG/1
0.5 TABLET ORAL
Qty: 30 TABLET | Refills: 0 | Status: SHIPPED | OUTPATIENT
Start: 2023-02-23

## 2023-03-13 DIAGNOSIS — E78.2 MIXED HYPERLIPIDEMIA: ICD-10-CM

## 2023-03-13 DIAGNOSIS — Z00.00 MEDICARE ANNUAL WELLNESS VISIT, SUBSEQUENT: Primary | ICD-10-CM

## 2023-03-13 DIAGNOSIS — E11.9 TYPE 2 DIABETES MELLITUS WITHOUT COMPLICATION, WITHOUT LONG-TERM CURRENT USE OF INSULIN (HCC): ICD-10-CM

## 2023-03-13 DIAGNOSIS — Z13.6 SCREENING FOR HYPERTENSION: ICD-10-CM

## 2023-03-27 DIAGNOSIS — F41.9 ANXIETY: ICD-10-CM

## 2023-03-27 RX ORDER — ALPRAZOLAM 0.5 MG/1
0.5 TABLET ORAL
Qty: 30 TABLET | Refills: 0 | Status: SHIPPED | OUTPATIENT
Start: 2023-03-27

## 2023-04-05 LAB
ALBUMIN SERPL-MCNC: 4.3 G/DL (ref 3.6–5.1)
ALBUMIN/CREAT UR: 14 MCG/MG CREAT
ALBUMIN/GLOB SERPL: 1.9 (CALC) (ref 1–2.5)
ALP SERPL-CCNC: 42 U/L (ref 37–153)
ALT SERPL-CCNC: 15 U/L (ref 6–29)
AST SERPL-CCNC: 19 U/L (ref 10–35)
BILIRUB SERPL-MCNC: 0.6 MG/DL (ref 0.2–1.2)
BUN SERPL-MCNC: 15 MG/DL (ref 7–25)
BUN/CREAT SERPL: ABNORMAL (CALC) (ref 6–22)
CALCIUM SERPL-MCNC: 9.7 MG/DL (ref 8.6–10.4)
CHLORIDE SERPL-SCNC: 100 MMOL/L (ref 98–110)
CHOLEST SERPL-MCNC: 179 MG/DL
CHOLEST/HDLC SERPL: 3.3 (CALC)
CO2 SERPL-SCNC: 28 MMOL/L (ref 20–32)
CREAT SERPL-MCNC: 0.83 MG/DL (ref 0.6–0.95)
CREAT UR-MCNC: 85 MG/DL (ref 20–275)
GFR/BSA.PRED SERPLBLD CYS-BASED-ARV: 69 ML/MIN/1.73M2
GLOBULIN SER CALC-MCNC: 2.3 G/DL (CALC) (ref 1.9–3.7)
GLUCOSE SERPL-MCNC: 106 MG/DL (ref 65–99)
HBA1C MFR BLD: 6.2 % OF TOTAL HGB
HDLC SERPL-MCNC: 54 MG/DL
LDLC SERPL CALC-MCNC: 99 MG/DL (CALC)
MICROALBUMIN UR-MCNC: 1.2 MG/DL
NONHDLC SERPL-MCNC: 125 MG/DL (CALC)
POTASSIUM SERPL-SCNC: 4.2 MMOL/L (ref 3.5–5.3)
PROT SERPL-MCNC: 6.6 G/DL (ref 6.1–8.1)
SODIUM SERPL-SCNC: 137 MMOL/L (ref 135–146)
TRIGL SERPL-MCNC: 163 MG/DL

## 2023-04-12 PROBLEM — I47.10 SVT (SUPRAVENTRICULAR TACHYCARDIA): Status: ACTIVE | Noted: 2023-04-12

## 2023-04-12 PROBLEM — I47.1 SVT (SUPRAVENTRICULAR TACHYCARDIA) (HCC): Status: ACTIVE | Noted: 2023-04-12

## 2023-04-13 PROBLEM — M25.461 PAIN AND SWELLING OF RIGHT KNEE: Status: ACTIVE | Noted: 2023-04-13

## 2023-04-13 PROBLEM — D09.9 SQUAMOUS CELL CARCINOMA IN SITU: Status: ACTIVE | Noted: 2023-04-13

## 2023-04-13 PROBLEM — N39.41 URGE INCONTINENCE OF URINE: Status: ACTIVE | Noted: 2023-04-13

## 2023-04-13 PROBLEM — M25.561 PAIN AND SWELLING OF RIGHT KNEE: Status: ACTIVE | Noted: 2023-04-13

## 2023-04-21 DIAGNOSIS — F41.9 ANXIETY: ICD-10-CM

## 2023-04-25 RX ORDER — ALPRAZOLAM 0.5 MG/1
0.5 TABLET ORAL
Qty: 30 TABLET | Refills: 0 | Status: SHIPPED | OUTPATIENT
Start: 2023-04-25

## 2023-05-22 DIAGNOSIS — F41.9 ANXIETY: ICD-10-CM

## 2023-05-22 RX ORDER — ALPRAZOLAM 0.5 MG/1
0.5 TABLET ORAL
Qty: 30 TABLET | Refills: 0 | Status: SHIPPED | OUTPATIENT
Start: 2023-05-22

## 2023-06-16 DIAGNOSIS — E11.9 TYPE 2 DIABETES MELLITUS WITHOUT COMPLICATION, WITHOUT LONG-TERM CURRENT USE OF INSULIN (HCC): Primary | ICD-10-CM

## 2023-06-16 DIAGNOSIS — E78.2 MIXED HYPERLIPIDEMIA: ICD-10-CM

## 2023-06-21 DIAGNOSIS — F41.9 ANXIETY: ICD-10-CM

## 2023-06-21 RX ORDER — ALPRAZOLAM 0.5 MG/1
TABLET ORAL
Qty: 30 TABLET | Refills: 0 | Status: SHIPPED | OUTPATIENT
Start: 2023-06-21

## 2023-07-06 LAB
ALBUMIN SERPL-MCNC: 4 G/DL (ref 3.6–5.1)
ALBUMIN/GLOB SERPL: 1.7 (CALC) (ref 1–2.5)
ALP SERPL-CCNC: 44 U/L (ref 37–153)
ALT SERPL-CCNC: 15 U/L (ref 6–29)
AST SERPL-CCNC: 18 U/L (ref 10–35)
BILIRUB SERPL-MCNC: 0.5 MG/DL (ref 0.2–1.2)
BUN SERPL-MCNC: 20 MG/DL (ref 7–25)
BUN/CREAT SERPL: ABNORMAL (CALC) (ref 6–22)
CALCIUM SERPL-MCNC: 9.5 MG/DL (ref 8.6–10.4)
CHLORIDE SERPL-SCNC: 100 MMOL/L (ref 98–110)
CHOLEST SERPL-MCNC: 217 MG/DL
CHOLEST/HDLC SERPL: 3.7 (CALC)
CO2 SERPL-SCNC: 30 MMOL/L (ref 20–32)
CREAT SERPL-MCNC: 0.93 MG/DL (ref 0.6–0.95)
GFR/BSA.PRED SERPLBLD CYS-BASED-ARV: 60 ML/MIN/1.73M2
GLOBULIN SER CALC-MCNC: 2.3 G/DL (CALC) (ref 1.9–3.7)
GLUCOSE SERPL-MCNC: 107 MG/DL (ref 65–99)
HBA1C MFR BLD: 6 % OF TOTAL HGB
HDLC SERPL-MCNC: 59 MG/DL
LDLC SERPL CALC-MCNC: 135 MG/DL (CALC)
NONHDLC SERPL-MCNC: 158 MG/DL (CALC)
POTASSIUM SERPL-SCNC: 4.5 MMOL/L (ref 3.5–5.3)
PROT SERPL-MCNC: 6.3 G/DL (ref 6.1–8.1)
SODIUM SERPL-SCNC: 136 MMOL/L (ref 135–146)
TRIGL SERPL-MCNC: 120 MG/DL

## 2023-07-14 ENCOUNTER — PROCEDURE VISIT (OUTPATIENT)
Dept: FAMILY MEDICINE CLINIC | Facility: CLINIC | Age: 88
End: 2023-07-14
Payer: COMMERCIAL

## 2023-07-14 VITALS
WEIGHT: 147 LBS | SYSTOLIC BLOOD PRESSURE: 120 MMHG | HEIGHT: 63 IN | BODY MASS INDEX: 26.05 KG/M2 | HEART RATE: 80 BPM | RESPIRATION RATE: 16 BRPM | DIASTOLIC BLOOD PRESSURE: 66 MMHG | TEMPERATURE: 97.5 F | OXYGEN SATURATION: 97 %

## 2023-07-14 DIAGNOSIS — I48.91 NEW ONSET A-FIB (HCC): ICD-10-CM

## 2023-07-14 DIAGNOSIS — E78.2 MIXED HYPERLIPIDEMIA: ICD-10-CM

## 2023-07-14 DIAGNOSIS — E11.9 TYPE 2 DIABETES MELLITUS WITHOUT COMPLICATION, WITHOUT LONG-TERM CURRENT USE OF INSULIN (HCC): ICD-10-CM

## 2023-07-14 DIAGNOSIS — L98.9 SKIN LESION: Primary | ICD-10-CM

## 2023-07-14 PROCEDURE — 99214 OFFICE O/P EST MOD 30 MIN: CPT | Performed by: NURSE PRACTITIONER

## 2023-07-14 RX ORDER — APIXABAN 5 MG/1
TABLET, FILM COATED ORAL
COMMUNITY
Start: 2023-07-10

## 2023-07-14 NOTE — PROGRESS NOTES
Assessment/Plan:    1. Skin lesion  -     Shave lesion  -     Lesion Destruction  -     Pathology Report  -     Pathology Report    2. Type 2 diabetes mellitus without complication, without long-term current use of insulin [facility])  Assessment & Plan:    Lab Results   Component Value Date    HGBA1C 6.0 (H) 07/05/2023         3. New onset a-fib [facility])  Assessment & Plan:  Controlled afib at recent cardiology visit. Pt is to start eliquis daily tomorrow. 4. Mixed hyperlipidemia  Assessment & Plan:  Pt has Dc'd simvastatin r/t myalgias. Cardiology following. Will hold statin therapy, start eliquis and follow up for further intervention. Return in about 3 months (around 10/14/2023) for Diabetic Follow Up. Subjective:      Patient ID: Александр Tomas is a 82 y.o. female. Chief Complaint   Patient presents with   • Skin Lesion     Pt here for a removal of two lesions. Alon Harris is an 82year old female with diabetes and hyperlipidemia, who presents to the office for diabetic follow up and removal of skin lesions in various areas on her chin and face. No new acute complaints today. The following portions of the patient's history were reviewed and updated as appropriate: allergies, current medications, past family history, past medical history, past social history, past surgical history and problem list.    Review of Systems   Constitutional: Negative for chills, fatigue and fever. Respiratory: Negative for cough, chest tightness and shortness of breath. Cardiovascular: Negative for chest pain.    Skin:        mx skin lesions         Current Outpatient Medications   Medication Sig Dispense Refill   • ascorbic acid (VITAMIN C) 250 mg tablet Take 500 mg by mouth daily     • b complex vitamins capsule Take 1 capsule by mouth daily     • Calcium Carbonate (CALTRATE 600 PO) Take by mouth in the morning     • fish oil-omega-3 fatty acids 1000 MG capsule Take by mouth     • FLUoxetine (PROzac) 20 mg capsule TAKE 1 CAPSULE BY MOUTH  DAILY 90 capsule 3   • Lactobacillus (ACIDOPHILUS PO) Take by mouth daily     • Lancets (OneTouch Delica Plus [id number]) MISC TEST TWO TIMES A DAY BEFORE BREAKFAST AND BEFORE DINNER 100 each 4   • metFORMIN (GLUCOPHAGE) 500 mg tablet TAKE 1 TABLET BY MOUTH  TWICE DAILY 180 tablet 3   • metoprolol succinate (TOPROL-XL) 25 mg 24 hr tablet Take 50 mg by mouth daily     • Misc Natural Products (LUTEIN 20 PO) Take by mouth daily     • Multiple Vitamins-Minerals (CENTRUM SILVER 50+WOMEN PO) Take by mouth daily     • ONE TOUCH LANCETS MISC by Does not apply route     • OneTouch Ultra test strip TEST TWO TIMES A  strip 4   • polyethylene glycol-propylene glycol (Systane) 0.4-0.3 % both eyes four times daily     • RABEprazole (ACIPHEX) 20 MG tablet      • VITAMIN E PO Take 180 mg by mouth daily     • Eliquis 5 MG  (Patient not taking: Reported on 7/14/2023)     • simvastatin (ZOCOR) 20 mg tablet TAKE 1 TABLET BY MOUTH  DAILY AT BEDTIME (Patient not taking: Reported on 7/14/2023) 90 tablet 3     No current facility-administered medications for this visit. Objective:    /66 (BP Location: Left arm, Patient Position: Sitting, Cuff Size: Adult)   Pulse 80   Temp 97.5 °F (36.4 °C) (Temporal)   Resp 16   Ht 5' 3" (1.6 m)   Wt 66.7 kg (147 lb)   SpO2 97%   BMI 26.04 kg/m²        Physical Exam  Constitutional:       General: She is not in acute distress. Appearance: She is well-developed. She is not diaphoretic. HENT:      Head: Normocephalic and atraumatic. Eyes:      General:         Right eye: No discharge. Left eye: No discharge. Conjunctiva/sclera: Conjunctivae normal.   Neck:      Thyroid: No thyromegaly. Cardiovascular:      Rate and Rhythm: Normal rate and regular rhythm. Pulses: no weak pulses          Dorsalis pedis pulses are 2+ on the right side and 2+ on the left side.         Posterior tibial pulses are 2+ on the right side and 2+ on the left side. Heart sounds: Normal heart sounds. Pulmonary:      Effort: Pulmonary effort is normal. No respiratory distress. Breath sounds: Normal breath sounds. No decreased breath sounds, wheezing, rhonchi or rales. Musculoskeletal:      Cervical back: Normal range of motion and neck supple. Feet:      Right foot:      Skin integrity: No ulcer, skin breakdown, erythema, warmth, callus or dry skin. Left foot:      Skin integrity: No ulcer, skin breakdown, erythema, warmth, callus or dry skin. Lymphadenopathy:      Cervical: No cervical adenopathy. Skin:     General: Skin is warm and dry. Findings: No rash. Neurological:      Mental Status: She is alert and oriented to person, place, and time. Psychiatric:         Behavior: Behavior normal.         Thought Content: Thought content normal.         Judgment: Judgment normal.           Patient's shoes and socks removed. Right Foot/Ankle   Right Foot Inspection  Skin Exam: skin normal and skin intact. No dry skin, no warmth, no callus, no erythema, no maceration, no abnormal color, no pre-ulcer, no ulcer and no callus. Toe Exam: ROM and strength within normal limits. Sensory   Vibration: intact  Proprioception: intact  Monofilament testing: intact    Vascular  Capillary refills: < 3 seconds  The right DP pulse is 2+. The right PT pulse is 2+. Left Foot/Ankle  Left Foot Inspection  Skin Exam: skin normal and skin intact. No dry skin, no warmth, no erythema, no maceration, normal color, no pre-ulcer, no ulcer and no callus. Toe Exam: ROM and strength within normal limits. Sensory   Vibration: intact  Proprioception: intact  Monofilament testing: intact    Vascular  Capillary refills: < 3 seconds  The left DP pulse is 2+. The left PT pulse is 2+.      Assign Risk Category  No deformity present  No loss of protective sensation  No weak pulses  Risk: [address] [facility],  [address] Benetta Najjar

## 2023-07-14 NOTE — PROGRESS NOTES
Lesion Destruction    Date/Time: 7/14/2023 11:20 AM    Performed by: EVANGELISTA Baird  Authorized by: EVANGELISTA Baird  Universal Protocol:  Consent given by: patient  Patient understanding: patient states understanding of the procedure being performed  Patient identity confirmed: verbally with patient      Procedure Details - Lesion Destruction:     Number of Lesions:  2  Lesion 1:     Body area:  Head/neck    Head/neck location:  Neck    Malignancy: malignancy unknown      Destruction method: cryotherapy    Lesion 2:     Body area:  [facility]    Head/neck location:  Neck    Malignancy: malignancy unknown      Destruction method: cryotherapy

## 2023-07-14 NOTE — PROGRESS NOTES
Shave lesion    Date/Time: 7/14/2023 11:20 AM    Performed by: EVANGELISTA Parks  Authorized by: EVANGELISTA Parks  Universal Protocol:  Consent given by: patient  Patient identity confirmed: verbally with patient      Number of Lesions: 2  Lesion 1:     Body area: head/neck    Head/neck location: chin    Malignancy: malignancy unknown      Destruction method: shave removal    Lesion 2:     Body area: head/neck    Head/neck location: L ear    Malignancy: malignancy unknown      Destruction method: shave removal

## 2023-07-14 NOTE — ASSESSMENT & PLAN NOTE
Pt has Dc'd simvastatin r/t myalgias. Cardiology following. Will hold statin therapy, start eliquis and follow up for further intervention.

## 2023-07-20 ENCOUNTER — TELEPHONE (OUTPATIENT)
Dept: FAMILY MEDICINE CLINIC | Facility: CLINIC | Age: 88
End: 2023-07-20

## 2023-07-20 DIAGNOSIS — F41.9 ANXIETY: Primary | ICD-10-CM

## 2023-07-20 LAB
CLINICAL INFO: NORMAL
CLINICAL INFO: NORMAL
PATH REPORT.FINAL DX SPEC: NORMAL
PATH REPORT.FINAL DX SPEC: NORMAL
PATH REPORT.MICROSCOPIC SPEC OTHER STN: NORMAL
SPECIMEN SOURCE: NORMAL
SPECIMEN SOURCE: NORMAL

## 2023-07-20 RX ORDER — ALPRAZOLAM 0.5 MG/1
0.5 TABLET ORAL 3 TIMES DAILY PRN
Qty: 30 TABLET | Refills: 0 | Status: SHIPPED | OUTPATIENT
Start: 2023-07-20 | End: 2023-07-22 | Stop reason: SDUPTHER

## 2023-07-20 NOTE — TELEPHONE ENCOUNTER
Aileen left the following message on voicemail. I noticed that alprazolam was not on her current med list.  I called and spoke to [facility] and she stated that she is still on it. Hi, this is Estefany Laughter, telephone [phone]. I'm calling for a refill on Alprazolam 0.5 milligram's YOB: 1935 Thank you. Only has 6 left      She did see her cardiologist and he changed some of her medications. She is now on.   She has AFIB    Eliquis  5 mg 2 x day am & pm (took off aspirin) - New  Metolprol 50 mg 2 in the am (new directions)  Metformin for diabetes  Rabeprazole  Fluoxetine 20 mg  Xanax     Stopped simvastatin        SR in [facility]

## 2023-07-21 DIAGNOSIS — F41.9 ANXIETY: ICD-10-CM

## 2023-07-21 RX ORDER — ALPRAZOLAM 0.5 MG/1
0.5 TABLET ORAL
Qty: 30 TABLET | Refills: 0 | OUTPATIENT
Start: 2023-07-21

## 2023-07-21 NOTE — TELEPHONE ENCOUNTER
Requested medication(s) are due for refill today: No  Patient has already received a courtesy refill: No  Other reason request has been forwarded to provider: [address] [place] [address] Street

## 2023-07-22 DIAGNOSIS — F41.9 ANXIETY: ICD-10-CM

## 2023-07-22 NOTE — TELEPHONE ENCOUNTER
Patient is calling in regarding her request for ALPRAZolam Chriskrys Reeder) 0.5 mg tablet     Which was supposed to go to her local [address] [place] [address] in [place] and it went to [facility] instead. She only has enough medication for 1 day.     Please re-sbumit to correct pharmacy

## 2023-07-24 RX ORDER — ALPRAZOLAM 0.5 MG/1
0.5 TABLET ORAL 3 TIMES DAILY PRN
Qty: 30 TABLET | Refills: 0 | Status: SHIPPED | OUTPATIENT
Start: 2023-07-24

## 2023-07-24 NOTE — TELEPHONE ENCOUNTER
Aileen called and stated that she called [facility] and  they cancelled the rx due to them being out of stock    Can you please send to [address] [place] [address] in [facility] are you able to send this in for her.   She is down to one pill

## 2023-08-11 DIAGNOSIS — E11.9 TYPE 2 DIABETES MELLITUS WITHOUT COMPLICATION, WITHOUT LONG-TERM CURRENT USE OF INSULIN (HCC): Primary | ICD-10-CM

## 2023-08-20 DIAGNOSIS — F41.9 ANXIETY: ICD-10-CM

## 2023-08-20 RX ORDER — ALPRAZOLAM 0.5 MG/1
0.5 TABLET ORAL 3 TIMES DAILY PRN
Qty: 30 TABLET | Refills: 0 | Status: CANCELLED | OUTPATIENT
Start: 2023-08-20

## 2023-08-20 NOTE — TELEPHONE ENCOUNTER
Medication Refill Request     Name ALPRAZolam Lucian Juneau)   Dose/Frequency   0.5 mg tablet Take 1 tablet (0.5 mg total) by mouth 3 (three) times a day as needed for anxiety     Quantity 30  Verified pharmacy   [x]  Verified ordering Provider   [x]  Does patient have enough for the next 3 days?  Yes [x] No []

## 2023-08-21 DIAGNOSIS — F41.9 ANXIETY: ICD-10-CM

## 2023-08-22 ENCOUNTER — TELEPHONE (OUTPATIENT)
Dept: FAMILY MEDICINE CLINIC | Facility: CLINIC | Age: 88
End: 2023-08-22

## 2023-08-22 RX ORDER — ALPRAZOLAM 0.5 MG/1
0.5 TABLET ORAL 3 TIMES DAILY PRN
Qty: 30 TABLET | Refills: 0 | Status: SHIPPED | OUTPATIENT
Start: 2023-08-22

## 2023-08-22 NOTE — TELEPHONE ENCOUNTER
Spoke to Jyoti Ruffin and relayed Dr. Ana Rosa Whaley message. She was very thankful!!!   No further action required

## 2023-08-22 NOTE — TELEPHONE ENCOUNTER
Daughter called and stated that mom has an appt in October. She received an order for just A1C. They are asking if this can be done in the office as a finger stick. Sometimes its difficult to get mom to the lab?

## 2023-09-19 DIAGNOSIS — F41.9 ANXIETY: ICD-10-CM

## 2023-09-19 RX ORDER — ALPRAZOLAM 0.5 MG/1
0.5 TABLET ORAL 3 TIMES DAILY PRN
Qty: 30 TABLET | Refills: 0 | Status: SHIPPED | OUTPATIENT
Start: 2023-09-19

## 2023-09-19 NOTE — TELEPHONE ENCOUNTER
Reason for call:   [x] Refill   [] Prior Auth  [] Other:     Office:   [x] PCP/Provider -   [] Speciality/Provider -     Medication: xanax     Dose/Frequency: 0.5 mg prn    Quantity: 30 tablets    Pharmacy: [address]     Does the patient have enough for 3 days?    [] Yes   [x] No - Send as HP to POD

## 2023-09-20 ENCOUNTER — TELEPHONE (OUTPATIENT)
Age: 88
End: 2023-09-20

## 2023-09-20 NOTE — TELEPHONE ENCOUNTER
Patient daughter called wanting to speak to Dr. Caio Horner. She stated that she is concerned over her mothers well being. She said that she recently discussed about the xanax and that her mother has been taking it for well over 30 years. She is aware that her mother cannot stop the benzo due to negative side effect that will arise. However, her mother is becoming increasingly  worse. She stated that patient has been having visual hallucinations like seeing smoke and seeing bats at night and now starting to have really bad auditory hallucination from hearing the window tap at night like if someone is throwing rocks, doorbell ringing, loud arguing, or hearing her daughter talk and the daughter is no where in the house. Both daughter and patient think that she might be sleep walking because certain objects in the house have been moved around. She stated that her brother used to sleep walk as well, that it runs int he family. Grandson that periodically stays in the basement said that he hears his grandmother walking around the house at night. Gus Lewis stated that all of this started getting worse in July so she asked her mother what happened in July. Patient put her cat down so that was a little traumatic for her. As per patient,  cardiologist told patient to start taking her Prozac at night before bed because it was making her sleepy during the day. Leigha Russell  Patient daughter wants to know what she can do before this issue becomes a danger to her mother.   Please call daughter Jannine Klinefelter

## 2023-09-20 NOTE — TELEPHONE ENCOUNTER
I THINK WHAT WOULD BE BEST IF WE ALL SAT DOWN HERE IN THE OFFICE AND DEVISE A PLAN OF ACTION    LETS TRY AND SET SOMETHING UP IN THE NEXT 2 WEEKS    THANKS

## 2023-09-21 NOTE — TELEPHONE ENCOUNTER
Spoke to Aileen's daughter. She has an appt with Khai Garcia on 10/12. She will discuss things with her. I tried to get her in sooner but the daughter could not make it the times offered.

## 2023-10-12 ENCOUNTER — OFFICE VISIT (OUTPATIENT)
Dept: FAMILY MEDICINE CLINIC | Facility: CLINIC | Age: 88
End: 2023-10-12
Payer: COMMERCIAL

## 2023-10-12 VITALS
OXYGEN SATURATION: 97 % | SYSTOLIC BLOOD PRESSURE: 148 MMHG | BODY MASS INDEX: 26.05 KG/M2 | WEIGHT: 147 LBS | RESPIRATION RATE: 14 BRPM | TEMPERATURE: 95.6 F | HEART RATE: 70 BPM | DIASTOLIC BLOOD PRESSURE: 72 MMHG | HEIGHT: 63 IN

## 2023-10-12 DIAGNOSIS — F43.22 ADJUSTMENT REACTION WITH ANXIETY: Primary | ICD-10-CM

## 2023-10-12 DIAGNOSIS — I48.91 NEW ONSET A-FIB (HCC): ICD-10-CM

## 2023-10-12 DIAGNOSIS — E11.9 TYPE 2 DIABETES MELLITUS WITHOUT COMPLICATION, WITHOUT LONG-TERM CURRENT USE OF INSULIN (HCC): ICD-10-CM

## 2023-10-12 DIAGNOSIS — R07.9 CHEST PAIN, UNSPECIFIED TYPE: ICD-10-CM

## 2023-10-12 DIAGNOSIS — Z23 ENCOUNTER FOR IMMUNIZATION: ICD-10-CM

## 2023-10-12 LAB — SL AMB POCT HEMOGLOBIN AIC: 6 (ref ?–6.5)

## 2023-10-12 PROCEDURE — 83036 HEMOGLOBIN GLYCOSYLATED A1C: CPT | Performed by: NURSE PRACTITIONER

## 2023-10-12 PROCEDURE — 99214 OFFICE O/P EST MOD 30 MIN: CPT | Performed by: NURSE PRACTITIONER

## 2023-10-12 RX ORDER — PANTOPRAZOLE SODIUM 40 MG/1
TABLET, DELAYED RELEASE ORAL
COMMUNITY
Start: 2023-10-10 | End: 2023-10-12

## 2023-10-12 RX ORDER — FLUOXETINE 10 MG/1
10 CAPSULE ORAL DAILY
Qty: 90 CAPSULE | Refills: 0 | Status: SHIPPED | OUTPATIENT
Start: 2023-10-12

## 2023-10-12 RX ORDER — PITAVASTATIN CALCIUM 2.09 MG/1
TABLET, FILM COATED ORAL
COMMUNITY
Start: 2023-08-10

## 2023-10-12 NOTE — PROGRESS NOTES
Assessment/Plan:    1. Adjustment reaction with anxiety  Assessment & Plan:  Pt's daughter requesting dose decrease and we discussed benefits of keeping dosing the same. Orders:  -     FLUoxetine (PROzac) 10 mg capsule; Take 1 capsule (10 mg total) by mouth daily    2. Type 2 diabetes mellitus without complication, without long-term current use of insulin ([address])  Assessment & Plan:  Pt is doing well overall. Encourage diabetic diet, exercise as tolerated. Recheck 3 months. Lab Results   Component Value Date    HGBA1C 6.0 10/12/2023       Orders:  -     POCT hemoglobin A1c    3. Chest pain, unspecified type  -     POCT ECG    4. Encounter for immunization  -     influenza vaccine, high-dose, PF 0.7 mL (FLUZONE HIGH-DOSE)    5. New onset a-fib ([address])  Assessment & Plan:  Pt recently increased dosing of metoprolol per cardiology recommendation. Suspect this may be leading increased daytime fatigue. Advise fluoxetine dosing be changed back to AM. Alprazolam remain at PM.   Discuss beta blocker dosing with cardiology. Patient Instructions   Take Claritin daily - monitor for improvement in itchy eye symptoms. Return in about 2 weeks (around 10/26/2023), or if symptoms worsen or fail to improve. Subjective:      Patient ID: Jaspreet Solitario is a 82 y.o. female. Chief Complaint   Patient presents with    Follow-up     Pt here for 3 mos DM check       Justyna Chirinos is an 82year old female who presents to the office, accompanied by her daughter, for evaluation and management of sleep disturbance. Pt's daughter assisted in providing the history. Reports that pt has switched fluoxetine dosing to nighttime due to feeling fatigued during the day. Pt's daughter reports that the patient has been walking around and talking in her sleep. Reports that she continues to be fatigued during the day. Pt's daughter concerned that pt is taking fluoxetine and xanax at the same time at night.  Pt reports she has been on both of these medications for over 20 years. States that she recently had afib and was increased on metoprolol dosing. Pt also reports some chest pain, chronic, intermittent. The following portions of the patient's history were reviewed and updated as appropriate: allergies, current medications, past family history, past medical history, past social history, past surgical history and problem list.    Review of Systems   Constitutional:  Positive for fatigue. Negative for chills and fever. Respiratory:  Negative for cough, chest tightness and shortness of breath. Cardiovascular:  Negative for chest pain. Psychiatric/Behavioral:  Positive for dysphoric mood and sleep disturbance. The patient is nervous/anxious.           Current Outpatient Medications   Medication Sig Dispense Refill    ALPRAZolam (XANAX) 0.5 mg tablet Take 1 tablet (0.5 mg total) by mouth 3 (three) times a day as needed for anxiety (Patient taking differently: Take 0.5 mg by mouth daily at bedtime as needed for anxiety) 30 tablet 0    ascorbic acid (VITAMIN C) 250 mg tablet Take 500 mg by mouth daily      b complex vitamins capsule Take 1 capsule by mouth daily      Eliquis 5 MG       fish oil-omega-3 fatty acids 1000 MG capsule Take by mouth      FLUoxetine (PROzac) 10 mg capsule Take 1 capsule (10 mg total) by mouth daily 90 capsule 0    Lactobacillus (ACIDOPHILUS PO) Take by mouth daily      Lancets (OneTouch Delica Plus [id number]) MISC TEST TWO TIMES A DAY BEFORE BREAKFAST AND BEFORE DINNER 100 each 4    Livalo 2 MG       metFORMIN (GLUCOPHAGE) 500 mg tablet TAKE 1 TABLET BY MOUTH  TWICE DAILY 180 tablet 3    metoprolol succinate (TOPROL-XL) 25 mg 24 hr tablet Take 50 mg by mouth daily      Misc Natural Products (LUTEIN 20 PO) Take by mouth daily      Multiple Vitamins-Minerals (CENTRUM SILVER 50+WOMEN PO) Take by mouth daily      ONE TOUCH LANCETS MISC by Does not apply route      OneTouch Ultra test strip TEST TWO TIMES A DAY 100 strip 4    polyethylene glycol-propylene glycol (Systane) 0.4-0.3 % both eyes four times daily      RABEprazole (ACIPHEX) 20 MG tablet       VITAMIN E PO Take 180 mg by mouth daily      simvastatin (ZOCOR) 20 mg tablet TAKE 1 TABLET BY MOUTH  DAILY AT BEDTIME 90 tablet 3     No current facility-administered medications for this visit. Objective:    /72 (BP Location: Left arm, Patient Position: Sitting, Cuff Size: Large)   Pulse 70   Temp (!) 95.6 °F (35.3 °C) (Temporal)   Resp 14   Ht 5' 3" (1.6 m)   Wt 66.7 kg (147 lb)   SpO2 97%   BMI 26.04 kg/m²        Physical Exam  Vitals reviewed. Constitutional:       General: She is not in acute distress. Appearance: Normal appearance. She is well-developed. She is not diaphoretic. HENT:      Head: Normocephalic and atraumatic. Eyes:      General:         Right eye: No discharge. Left eye: No discharge. Conjunctiva/sclera: Conjunctivae normal.   Neck:      Thyroid: No thyromegaly. Cardiovascular:      Rate and Rhythm: Normal rate and regular rhythm. Heart sounds: Normal heart sounds. Pulmonary:      Effort: Pulmonary effort is normal. No respiratory distress. Breath sounds: Normal breath sounds. No decreased breath sounds, wheezing, rhonchi or rales. Musculoskeletal:      Cervical back: Normal range of motion and neck supple. Lymphadenopathy:      Cervical: No cervical adenopathy. Skin:     General: Skin is warm and dry. Findings: No rash. Neurological:      Mental Status: She is alert and oriented to person, place, and time. Psychiatric:         Behavior: Behavior normal.         Thought Content:  Thought content normal.         Judgment: Judgment normal.                EVANGELISTA Ramos

## 2023-10-17 PROCEDURE — 93000 ELECTROCARDIOGRAM COMPLETE: CPT | Performed by: NURSE PRACTITIONER

## 2023-10-17 NOTE — ASSESSMENT & PLAN NOTE
Pt is doing well overall. Encourage diabetic diet, exercise as tolerated. Recheck 3 months.      Lab Results   Component Value Date    HGBA1C 6.0 10/12/2023

## 2023-10-17 NOTE — ASSESSMENT & PLAN NOTE
Pt recently increased dosing of metoprolol per cardiology recommendation. Suspect this may be leading increased daytime fatigue. Advise fluoxetine dosing be changed back to AM. Alprazolam remain at PM.   Discuss beta blocker dosing with cardiology.

## 2023-10-18 DIAGNOSIS — F41.9 ANXIETY: ICD-10-CM

## 2023-10-18 RX ORDER — ALPRAZOLAM 0.5 MG/1
0.5 TABLET ORAL 3 TIMES DAILY PRN
Qty: 30 TABLET | Refills: 0 | Status: SHIPPED | OUTPATIENT
Start: 2023-10-18

## 2023-10-18 NOTE — TELEPHONE ENCOUNTER
Refill must be reviewed and completed by the office or provider.  The refill is unable to be approved by the medication management team.

## 2023-10-18 NOTE — TELEPHONE ENCOUNTER
Reason for call:   [x] Refill   [] Prior Auth  [] Other:     Office:   [x] PCP/Provider -   [] Specialty/Provider -     Medication: Rishi Banegas    Dose/Frequency: 0.5 MG    Quantity: 30    Pharmacy: [address] [place] [address]     Does the patient have enough for 3 days?    [x] Yes   [] No - Send as HP to POD

## 2023-10-26 DIAGNOSIS — E11.9 TYPE 2 DIABETES MELLITUS WITHOUT COMPLICATION, WITHOUT LONG-TERM CURRENT USE OF INSULIN (HCC): ICD-10-CM

## 2023-11-17 DIAGNOSIS — F41.9 ANXIETY: ICD-10-CM

## 2023-11-17 NOTE — TELEPHONE ENCOUNTER
Reason for call:   [x] Refill   [] Prior Auth  [] Other:     Office:   [x] PCP/Provider - Armando  [] Specialty/Provider -     Medication: Alprazolam 0.5mg    Dose/Frequency: 1 tab 3 times a day prn    Quantity: 30    [facility]    Does the patient have enough for 3 days?    [x] Yes   [] No - Send as HP to POD

## 2023-11-20 RX ORDER — ALPRAZOLAM 0.5 MG/1
0.5 TABLET ORAL 3 TIMES DAILY PRN
Qty: 30 TABLET | Refills: 0 | Status: SHIPPED | OUTPATIENT
Start: 2023-11-20

## 2023-11-28 LAB
LEFT EYE DIABETIC RETINOPATHY: NORMAL
RIGHT EYE DIABETIC RETINOPATHY: NORMAL

## 2023-12-18 DIAGNOSIS — F41.9 ANXIETY: ICD-10-CM

## 2023-12-18 RX ORDER — ALPRAZOLAM 0.5 MG/1
0.5 TABLET ORAL 3 TIMES DAILY PRN
Qty: 30 TABLET | Refills: 0 | Status: SHIPPED | OUTPATIENT
Start: 2023-12-18

## 2023-12-18 NOTE — TELEPHONE ENCOUNTER
The pods state that she needs an appt.  She had one in October and is not due for her AWV until April.    Can you fill this for her?

## 2023-12-18 NOTE — TELEPHONE ENCOUNTER
Reason for call:   [x] Refill   [] Prior Auth  [] Other:     Office:   [x] PCP/Provider -   [] Specialty/Provider -     Medication: XANAX    Dose/Frequency: 0.5    Quantity: 30    Pharmacy: [facility] [place], [place]    Does the patient have enough for 3 days?   [] Yes   [x] No - Send as HP to POD

## 2023-12-30 DIAGNOSIS — E11.9 TYPE 2 DIABETES MELLITUS WITHOUT COMPLICATION, WITHOUT LONG-TERM CURRENT USE OF INSULIN (HCC): ICD-10-CM

## 2023-12-31 DIAGNOSIS — E11.9 TYPE 2 DIABETES MELLITUS WITHOUT COMPLICATION, WITHOUT LONG-TERM CURRENT USE OF INSULIN (HCC): ICD-10-CM

## 2024-01-02 DIAGNOSIS — F43.22 ADJUSTMENT REACTION WITH ANXIETY: ICD-10-CM

## 2024-01-02 RX ORDER — LANCETS 30 GAUGE
EACH MISCELLANEOUS
Qty: 100 EACH | Refills: 5 | Status: SHIPPED | OUTPATIENT
Start: 2024-01-02

## 2024-01-02 RX ORDER — BLOOD SUGAR DIAGNOSTIC
STRIP MISCELLANEOUS
Qty: 100 STRIP | Refills: 4 | Status: SHIPPED | OUTPATIENT
Start: 2024-01-02

## 2024-01-02 NOTE — TELEPHONE ENCOUNTER
Reason for call:   [x] Refill   [] Prior Auth  [] Other:     Office:   [x] PCP/Provider - Billie Cota  [] Specialty/Provider -     Medication: fluoxetine     Dose/Frequency: 10 mg daily    Quantity: 90D w refill    [facility]    Does the patient have enough for 3 days?   [x] Yes   [] No - Send as HP to POD

## 2024-01-03 RX ORDER — FLUOXETINE 10 MG/1
10 CAPSULE ORAL DAILY
Qty: 90 CAPSULE | Refills: 0 | Status: SHIPPED | OUTPATIENT
Start: 2024-01-03

## 2024-01-17 DIAGNOSIS — F41.9 ANXIETY: ICD-10-CM

## 2024-01-17 RX ORDER — ALPRAZOLAM 0.5 MG/1
0.5 TABLET ORAL
Qty: 30 TABLET | Refills: 0 | Status: SHIPPED | OUTPATIENT
Start: 2024-01-17

## 2024-01-17 NOTE — TELEPHONE ENCOUNTER
Reason for call:   [x] Refill   [] Prior Auth  [] Other:     Office:   [x] PCP/Provider - Billie Cota  [] Specialty/Provider -     Medication:   Alprazolam 0.5 mg, 1 hs, 30      Pharmacy:   [facility]    Does the patient have enough for 3 days?   [x] Yes   [] No - Send as HP to POD

## 2024-02-15 DIAGNOSIS — F41.9 ANXIETY: ICD-10-CM

## 2024-02-15 NOTE — TELEPHONE ENCOUNTER
Reason for call: Pt requesting to have the alprazolam sent to pharm today, she does not have a car and have other meds to  that need to be picked up today, her transport will be picking up the other meds today.     Sending to pod at high priority  [x] Refill   [] Prior Auth  [] Other:     Office:   [x] PCP/Provider - Dr Roberts/ Billie  [] Specialty/Provider -     Medication: alprazolam     Dose/Frequency: 0.5 mg Daily as needed    Quantity: 30D w refill    Pharmacy: [facility] on file    Does the patient have enough for 3 days?   [] Yes   [x] No - Send as HP to POD

## 2024-02-16 RX ORDER — ALPRAZOLAM 0.5 MG/1
TABLET ORAL
Qty: 30 TABLET | Refills: 0 | Status: SHIPPED | OUTPATIENT
Start: 2024-02-16

## 2024-02-16 RX ORDER — ALPRAZOLAM 0.5 MG/1
0.5 TABLET ORAL
Qty: 30 TABLET | Refills: 1 | OUTPATIENT
Start: 2024-02-16

## 2024-02-16 NOTE — TELEPHONE ENCOUNTER
Requested medication(s) are due for refill today: No  Patient has already received a courtesy refill: No  Other reason request has been forwarded to provider: PT WAS GIVEN 1 REFILL - PLEASE REFUSE

## 2024-02-16 NOTE — TELEPHONE ENCOUNTER
I sent a message to Nadeem, she has a refill on the last month's rx already. She should be able to call the pharmacy and request that they get the refill ready. She can request this every 2 months instead of monthly.

## 2024-02-16 NOTE — TELEPHONE ENCOUNTER
Requested medication(s) are due for refill today: Yes  Patient has already received a courtesy refill: No  Other reason request has been forwarded to provider: This refill cannot be deleageted

## 2024-03-18 DIAGNOSIS — F41.9 ANXIETY: ICD-10-CM

## 2024-03-18 RX ORDER — ALPRAZOLAM 0.5 MG/1
0.5 TABLET ORAL
Qty: 30 TABLET | Refills: 0 | Status: SHIPPED | OUTPATIENT
Start: 2024-03-18

## 2024-03-18 NOTE — TELEPHONE ENCOUNTER
Reason for call:   [x] Refill   [] Prior Auth  [] Other:     Office:   [x] PCP/Provider -   [] Specialty/Provider -     Medication: ALPRAZolam (XANAX) 0.5 mg tablet     Dose/Frequency: TAKE ONE TABLET BY MOUTH EVERY DAY AT BEDTIME AS NEEDED FOR ANXIETY     Quantity: #30      Pharmacy: [id number] - [address] [phone]     Does the patient have enough for 3 days?   [] Yes   [x] No - Send as HP to POD

## 2024-03-21 ENCOUNTER — RA CDI HCC (OUTPATIENT)
Dept: OTHER | Facility: HOSPITAL | Age: 89
End: 2024-03-21

## 2024-03-21 NOTE — PROGRESS NOTES
I47.1  HCC coding opportunities          Chart Reviewed number of suggestions sent to Provider: 1     Patients Insurance     Medicare Insurance: [employer]vantage

## 2024-03-26 DIAGNOSIS — E11.9 TYPE 2 DIABETES MELLITUS WITHOUT COMPLICATION, WITHOUT LONG-TERM CURRENT USE OF INSULIN (HCC): ICD-10-CM

## 2024-03-28 RX ORDER — PANTOPRAZOLE SODIUM 40 MG
40 TABLET, DELAYED RELEASE (ENTERIC COATED) ORAL DAILY
COMMUNITY
Start: 2024-01-26

## 2024-03-28 RX ORDER — HYDROCODONE/ACETAMINOPHEN 5 MG-500MG
TABLET ORAL
COMMUNITY
End: 2024-03-29

## 2024-03-28 RX ORDER — TRIAMCINOLONE ACETONIDE 1 MG/G
CREAM TOPICAL
COMMUNITY
Start: 2023-12-27

## 2024-03-28 RX ORDER — FAMOTIDINE 40 MG/1
40 TABLET, FILM COATED ORAL 2 TIMES DAILY
COMMUNITY
Start: 2024-01-26

## 2024-03-28 RX ORDER — PSYLLIUM SEED (WITH DEXTROSE)
POWDER (GRAM) ORAL
COMMUNITY
End: 2024-03-29 | Stop reason: HOSPADM

## 2024-03-29 ENCOUNTER — OFFICE VISIT (OUTPATIENT)
Dept: FAMILY MEDICINE CLINIC | Facility: CLINIC | Age: 89
End: 2024-03-29
Payer: COMMERCIAL

## 2024-03-29 VITALS
RESPIRATION RATE: 16 BRPM | BODY MASS INDEX: 26.05 KG/M2 | HEART RATE: 69 BPM | WEIGHT: 147 LBS | OXYGEN SATURATION: 98 % | HEIGHT: 63 IN | DIASTOLIC BLOOD PRESSURE: 82 MMHG | TEMPERATURE: 97.1 F | SYSTOLIC BLOOD PRESSURE: 140 MMHG

## 2024-03-29 DIAGNOSIS — N18.31 TYPE 2 DIABETES MELLITUS WITH STAGE 3A CHRONIC KIDNEY DISEASE, WITHOUT LONG-TERM CURRENT USE OF INSULIN (HCC): ICD-10-CM

## 2024-03-29 DIAGNOSIS — I48.91 NEW ONSET A-FIB (HCC): ICD-10-CM

## 2024-03-29 DIAGNOSIS — E11.9 TYPE 2 DIABETES MELLITUS WITHOUT COMPLICATION, WITHOUT LONG-TERM CURRENT USE OF INSULIN (HCC): Primary | ICD-10-CM

## 2024-03-29 DIAGNOSIS — G47.9 SLEEP DISTURBANCE: ICD-10-CM

## 2024-03-29 DIAGNOSIS — E11.22 TYPE 2 DIABETES MELLITUS WITH STAGE 3A CHRONIC KIDNEY DISEASE, WITHOUT LONG-TERM CURRENT USE OF INSULIN (HCC): ICD-10-CM

## 2024-03-29 DIAGNOSIS — E78.01 FAMILIAL HYPERCHOLESTEROLEMIA: ICD-10-CM

## 2024-03-29 DIAGNOSIS — N18.30 STAGE 3 CHRONIC KIDNEY DISEASE, UNSPECIFIED WHETHER STAGE 3A OR 3B CKD (HCC): ICD-10-CM

## 2024-03-29 LAB — SL AMB POCT HEMOGLOBIN AIC: 6 (ref ?–6.5)

## 2024-03-29 PROCEDURE — 99214 OFFICE O/P EST MOD 30 MIN: CPT | Performed by: NURSE PRACTITIONER

## 2024-03-29 PROCEDURE — 83036 HEMOGLOBIN GLYCOSYLATED A1C: CPT | Performed by: NURSE PRACTITIONER

## 2024-03-29 NOTE — PROGRESS NOTES
Assessment/Plan:    1. Type 2 diabetes mellitus without complication, without long-term current use of insulin (HCC)  Assessment & Plan:  Doing well overall.  Encourage low sugar, low carb diet.  Recheck 6 months.   Lab Results   Component Value Date    HGBA1C 6.0 03/29/2024       Orders:  -     POCT hemoglobin A1c  -     Comprehensive metabolic panel; Future; Expected date: 06/29/2024  -     ALBUMIN, RANDOM URINE W/CREATININE; Future  -     Comprehensive metabolic panel  -     ALBUMIN, RANDOM URINE W/CREATININE    2. New onset a-fib ([facility])  Assessment & Plan:  Pt is following up with cardiology.  NSR in office today.  Stable, no changes.       3. Stage 3 chronic kidney disease, unspecified whether stage 3a or 3b CKD ([facility])  Assessment & Plan:  Lab Results   Component Value Date    EGFR 60 07/05/2023    EGFR 69 04/04/2023    CREATININE 0.93 07/05/2023    CREATININE 0.83 04/04/2023    CREATININE 0.92 (H) 06/15/2022   Stable, no issues at this time.     Orders:  -     Comprehensive metabolic panel; Future; Expected date: 06/29/2024  -     Comprehensive metabolic panel    4. Type 2 diabetes mellitus with stage 3a chronic kidney disease, without long-term current use of insulin ([facility])  Assessment & Plan:  Doing well overall.  Encourage low sugar, low carb diet.  Recheck 6 months.   Lab Results   Component Value Date    HGBA1C 6.0 03/29/2024       Orders:  -     Comprehensive metabolic panel; Future; Expected date: 06/29/2024  -     Hemoglobin A1C With EAG; Future; Expected date: 06/29/2024  -     Comprehensive metabolic panel  -     Hemoglobin A1C With EAG    5. Sleep disturbance  -     TSH, 3rd generation; Future; Expected date: 06/29/2024  -     TSH, 3rd generation    6. Familial hypercholesterolemia  -     Lipid panel; Future; Expected date: 06/29/2024  -     Lipid panel            There are no Patient Instructions on file for this visit.    Return in about 6 months (around 9/29/2024) for Diabetic Follow  Up.    Subjective:      Patient ID: Radha Cortes is a 88 y.o. female.    Chief Complaint   Patient presents with    Follow-up     Pt here for 6 month f/u       Radha is an 88 year old female with diabetes, hyperlipidemia, afib, anxiety, sleep disturbance, CKD III and psoriasis who presents to the office for a 6 month follow up. Pt is accompanied by her daughter who assisted in providing the history. Denies chest pain or shortness of breath. No new acute complaints at this time.         The following portions of the patient's history were reviewed and updated as appropriate: allergies, current medications, past family history, past medical history, past social history, past surgical history and problem list.    Review of Systems   Constitutional:  Negative for chills, fatigue and fever.   Respiratory:  Negative for cough, chest tightness and shortness of breath.    Cardiovascular:  Negative for chest pain.   Psychiatric/Behavioral:  Positive for sleep disturbance.          Current Outpatient Medications   Medication Sig Dispense Refill    ALPRAZolam (XANAX) 0.5 mg tablet Take 1 tablet (0.5 mg total) by mouth daily at bedtime as needed for anxiety 30 tablet 0    b complex vitamins capsule Take 1 capsule by mouth daily      Eliquis 5 MG Take 5 mg by mouth daily      FLUoxetine (PROzac) 10 mg capsule Take 1 capsule (10 mg total) by mouth daily 90 capsule 0    Lancets (OneTouch Delica Plus [id number]) MISC TEST TWO TIMES A DAY BEFORE BREAKFAST AND BEFORE DINNER 100 each 5    metFORMIN (GLUCOPHAGE) 500 mg tablet TAKE 1 TABLET BY MOUTH TWICE  DAILY 180 tablet 1    metoprolol succinate (TOPROL-XL) 25 mg 24 hr tablet Take 50 mg by mouth daily      ONE TOUCH LANCETS MISC by Does not apply route      OneTouch Ultra test strip TEST TWO TIMES A  strip 4    Pepcid 40 MG tablet Take 40 mg by mouth 2 (two) times a day      polyethylene glycol-propylene glycol (Systane) 0.4-0.3 % both eyes four times daily       "triamcinolone (KENALOG) 0.1 % cream       Protonix 40 MG tablet Take 40 mg by mouth daily       No current facility-administered medications for this visit.       Objective:    /82 (BP Location: Left arm, Patient Position: Sitting, Cuff Size: Large)   Pulse 69   Temp (!) 97.1 °F (36.2 °C) (Temporal)   Resp 16   Ht 5' 3\" (1.6 m)   Wt 66.7 kg (147 lb)   SpO2 98%   BMI 26.04 kg/m²        Physical Exam  Vitals reviewed.   Constitutional:       Appearance: Normal appearance.   HENT:      Head: Normocephalic and atraumatic.   Cardiovascular:      Rate and Rhythm: Normal rate and regular rhythm.      Heart sounds: Normal heart sounds.   Pulmonary:      Effort: Pulmonary effort is normal.      Breath sounds: Normal breath sounds.   Neurological:      Mental Status: She is alert and oriented to person, place, and time.   Psychiatric:         Mood and Affect: Mood normal.                EVANGELISTA Choi  "

## 2024-04-01 ENCOUNTER — TELEPHONE (OUTPATIENT)
Dept: ADMINISTRATIVE | Facility: OTHER | Age: 89
End: 2024-04-01

## 2024-04-01 NOTE — LETTER
Diabetic Eye Exam Form    Date Requested: 24  Patient: Radha Cortes  Patient : 1935   Referring Provider: EVANGELISTA Choi      DIABETIC Eye Exam Date _______________________________      Type of Exam MUST be documented for Diabetic Eye Exams. Please CHECK ONE.     Retinal Exam       Dilated Retinal Exam       OCT       Optomap-Iris Exam      Fundus Photography       Left Eye - Please check Retinopathy or No Retinopathy        Exam did show retinopathy    Exam did not show retinopathy       Right Eye - Please check Retinopathy or No Retinopathy       Exam did show retinopathy    Exam did not show retinopathy       Comments __________________________________________________________    Practice Providing Exam ______________________________________________    Exam Performed By (print name) _______________________________________      Provider Signature ___________________________________________________      These reports are needed for  compliance.  Please fax this completed form and a copy of the Diabetic Eye Exam report to our office located at [address] [place] [address] as soon as possible via Fax [phone] bryant [place]: Phone [phone]  We thank you for your assistance in treating our mutual patient.

## 2024-04-01 NOTE — TELEPHONE ENCOUNTER
----- Message from Nadeem Moore sent at 3/29/2024 11:29 AM EDT -----  Regarding: CARE GAP REQUST - DM EYE  03/29/24 11:29 AM    Hello, our patient attached above has had Diabetic Eye Exam completed/performed. Please assist in updating the patient chart by making an External outreach to [facility] facility located in [place], [place]. The date of service is 2023.    Thank you,  Nadeem Moore, [facility]  [facility]

## 2024-04-01 NOTE — TELEPHONE ENCOUNTER
Upon review of the In Basket request and the patient's chart, initial outreach has been made via fax to facility. Please see Contacts section for details.     Thank you  Tre Schmidt MA

## 2024-04-01 NOTE — LETTER
Diabetic Eye Exam Form    Date Requested: 24  Patient: Radha Cortes  Patient : 1935   Referring Provider: EVANGELISTA Choi      DIABETIC Eye Exam Date _______________________________      Type of Exam MUST be documented for Diabetic Eye Exams. Please CHECK ONE.     Retinal Exam       Dilated Retinal Exam       OCT       Optomap-Iris Exam      Fundus Photography       Left Eye - Please check Retinopathy or No Retinopathy        Exam did show retinopathy    Exam did not show retinopathy       Right Eye - Please check Retinopathy or No Retinopathy       Exam did show retinopathy    Exam did not show retinopathy       Comments __________________________________________________________    Practice Providing Exam ______________________________________________    Exam Performed By (print name) _______________________________________      Provider Signature ___________________________________________________      These reports are needed for  compliance.  Please fax this completed form and a copy of the Diabetic Eye Exam report to our office located at [address] [place] [address] as soon as possible via Fax [phone] bryant [place]: Phone [phone]  We thank you for your assistance in treating our mutual patient.

## 2024-04-02 NOTE — ASSESSMENT & PLAN NOTE
Lab Results   Component Value Date    EGFR 60 07/05/2023    EGFR 69 04/04/2023    CREATININE 0.93 07/05/2023    CREATININE 0.83 04/04/2023    CREATININE 0.92 (H) 06/15/2022   Stable, no issues at this time.

## 2024-04-02 NOTE — ASSESSMENT & PLAN NOTE
Doing well overall.  Encourage low sugar, low carb diet.  Recheck 6 months.   Lab Results   Component Value Date    HGBA1C 6.0 03/29/2024

## 2024-04-04 DIAGNOSIS — F43.22 ADJUSTMENT REACTION WITH ANXIETY: ICD-10-CM

## 2024-04-04 RX ORDER — FLUOXETINE 10 MG/1
10 CAPSULE ORAL DAILY
Qty: 90 CAPSULE | Refills: 1 | Status: SHIPPED | OUTPATIENT
Start: 2024-04-04

## 2024-04-04 NOTE — TELEPHONE ENCOUNTER
Reason for call:   [x] Refill   [] Prior Auth  [] Other:     Office:   [x] PCP/Provider - Beata Edgar   [] Specialty/Provider -     Medication: Fluoxetine     Dose/Frequency: 10mg -1 capsule daily     Quantity: 90    [facility]     Does the patient have enough for 3 days?   [x] Yes   [] No - Send as HP to POD

## 2024-04-05 NOTE — TELEPHONE ENCOUNTER
As a follow-up, a second attempt has been made for outreach via fax to facility. Please see Contacts section for details.    Thank you  Tre Schmidt MA

## 2024-04-09 NOTE — TELEPHONE ENCOUNTER
Upon review of the In Basket request we were able to locate, review, and update the patient chart as requested for Diabetic Eye Exam.    Any additional questions or concerns should be emailed to the Practice Liaisons via the appropriate education email address, please do not reply via In Basket.    Thank you  Tre Schmidt MA

## 2024-04-09 NOTE — TELEPHONE ENCOUNTER
As a final attempt, a third outreach has been made via telephone call to facility. Please see Contacts section for details. This encounter will be closed and completed by end of day. Should we receive the requested information because of previous outreach attempts, the requested patient's chart will be updated appropriately.     Thank you  Tre Schmidt MA

## 2024-04-16 DIAGNOSIS — F41.9 ANXIETY: ICD-10-CM

## 2024-04-16 RX ORDER — ALPRAZOLAM 0.5 MG/1
0.5 TABLET ORAL
Qty: 30 TABLET | Refills: 0 | Status: SHIPPED | OUTPATIENT
Start: 2024-04-16

## 2024-04-16 NOTE — TELEPHONE ENCOUNTER
Requested medication(s) are due for refill today: Yes  Patient has already received a courtesy refill: No  Other reason request has been forwarded to provider: this refill cannot be delegated

## 2024-04-16 NOTE — TELEPHONE ENCOUNTER
Reason for call:   [x] Refill   [] Prior Auth  [] Other:     Office:   [x] PCP/Provider - [place]  [] Specialty/Provider -     Medication: ALPRAZolam (XANAX) 0.5 mg tablet     Dose/Frequency: 0.5 mg, Oral, Daily at bedtime PRN     Quantity: 30    Pharmacy: [facility][id number] - [address]     Does the patient have enough for 3 days?   [x] Yes   [] No - Send as HP to POD

## 2024-05-16 DIAGNOSIS — F41.9 ANXIETY: ICD-10-CM

## 2024-05-16 RX ORDER — ALPRAZOLAM 0.5 MG/1
0.5 TABLET ORAL
Qty: 30 TABLET | Refills: 0 | Status: SHIPPED | OUTPATIENT
Start: 2024-05-16

## 2024-05-16 NOTE — TELEPHONE ENCOUNTER
Reason for call:   [x] Refill   [] Prior Auth  [] Other:     Office:   [x] PCP/Provider -  EVANGELISTA Choi   [] Specialty/Provider -     Medication: ALPRAZolam (XANAX) 0.5 mg tablet     Dose/Frequency: Take 1 tablet (0.5 mg total) by mouth daily at bedtime as needed for anxiety     Quantity: 30    Pharmacy: [facility] [address] [place] [address] [address] [phone]     Does the patient have enough for 3 days?   [] Yes   [x] No - Send as HP to POD

## 2024-06-17 DIAGNOSIS — F41.9 ANXIETY: ICD-10-CM

## 2024-06-17 RX ORDER — ALPRAZOLAM 0.5 MG/1
0.5 TABLET ORAL
Qty: 30 TABLET | Refills: 0 | Status: SHIPPED | OUTPATIENT
Start: 2024-06-17

## 2024-06-17 NOTE — TELEPHONE ENCOUNTER
Reason for call:   [x] Refill   [] Prior Auth  [] Other:     Office:   [x] PCP/Provider - Beata lau   [] Specialty/Provider -     Medication: Xanax    Dose/Frequency: 0.5mg daily at bedtime     Quantity: 30    [facility]     Does the patient have enough for 3 days?   [] Yes   [x] No - Send as HP to POD

## 2024-06-26 ENCOUNTER — RA CDI HCC (OUTPATIENT)
Dept: OTHER | Facility: HOSPITAL | Age: 89
End: 2024-06-26

## 2024-07-02 LAB
ALBUMIN SERPL-MCNC: 4.2 G/DL (ref 3.6–5.1)
ALBUMIN/CREAT UR: 5 MG/G CREAT
ALBUMIN/GLOB SERPL: 2.2 (CALC) (ref 1–2.5)
ALP SERPL-CCNC: 40 U/L (ref 37–153)
ALT SERPL-CCNC: 15 U/L (ref 6–29)
AST SERPL-CCNC: 19 U/L (ref 10–35)
BILIRUB SERPL-MCNC: 0.5 MG/DL (ref 0.2–1.2)
BUN SERPL-MCNC: 15 MG/DL (ref 7–25)
BUN/CREAT SERPL: 16 (CALC) (ref 6–22)
CALCIUM SERPL-MCNC: 9.4 MG/DL (ref 8.6–10.4)
CHLORIDE SERPL-SCNC: 102 MMOL/L (ref 98–110)
CHOLEST SERPL-MCNC: 164 MG/DL
CHOLEST/HDLC SERPL: 3 (CALC)
CO2 SERPL-SCNC: 30 MMOL/L (ref 20–32)
CREAT SERPL-MCNC: 0.96 MG/DL (ref 0.6–0.95)
CREAT UR-MCNC: 80 MG/DL (ref 20–275)
EST. AVERAGE GLUCOSE BLD GHB EST-MCNC: 143 MG/DL
EST. AVERAGE GLUCOSE BLD GHB EST-SCNC: 7.9 MMOL/L
GFR/BSA.PRED SERPLBLD CYS-BASED-ARV: 58 ML/MIN/1.73M2
GLOBULIN SER CALC-MCNC: 1.9 G/DL (CALC) (ref 1.9–3.7)
GLUCOSE SERPL-MCNC: 116 MG/DL (ref 65–99)
HBA1C MFR BLD: 6.6 % OF TOTAL HGB
HDLC SERPL-MCNC: 54 MG/DL
LDLC SERPL CALC-MCNC: 82 MG/DL (CALC)
MICROALBUMIN UR-MCNC: 0.4 MG/DL
NONHDLC SERPL-MCNC: 110 MG/DL (CALC)
POTASSIUM SERPL-SCNC: 4.8 MMOL/L (ref 3.5–5.3)
PROT SERPL-MCNC: 6.1 G/DL (ref 6.1–8.1)
SODIUM SERPL-SCNC: 137 MMOL/L (ref 135–146)
TRIGL SERPL-MCNC: 184 MG/DL
TSH SERPL-ACNC: 3.18 MIU/L (ref 0.4–4.5)

## 2024-07-03 ENCOUNTER — OFFICE VISIT (OUTPATIENT)
Dept: FAMILY MEDICINE CLINIC | Facility: CLINIC | Age: 89
End: 2024-07-03
Payer: COMMERCIAL

## 2024-07-03 VITALS
TEMPERATURE: 97.9 F | RESPIRATION RATE: 16 BRPM | OXYGEN SATURATION: 99 % | WEIGHT: 145 LBS | DIASTOLIC BLOOD PRESSURE: 60 MMHG | HEART RATE: 63 BPM | HEIGHT: 62 IN | BODY MASS INDEX: 26.68 KG/M2 | SYSTOLIC BLOOD PRESSURE: 119 MMHG

## 2024-07-03 DIAGNOSIS — F43.22 ADJUSTMENT REACTION WITH ANXIETY: ICD-10-CM

## 2024-07-03 DIAGNOSIS — N39.46 MIXED STRESS AND URGE URINARY INCONTINENCE: ICD-10-CM

## 2024-07-03 DIAGNOSIS — Z23 NEED FOR SHINGLES VACCINE: ICD-10-CM

## 2024-07-03 DIAGNOSIS — N18.31 TYPE 2 DIABETES MELLITUS WITH STAGE 3A CHRONIC KIDNEY DISEASE, WITHOUT LONG-TERM CURRENT USE OF INSULIN (HCC): ICD-10-CM

## 2024-07-03 DIAGNOSIS — E11.22 TYPE 2 DIABETES MELLITUS WITH STAGE 3A CHRONIC KIDNEY DISEASE, WITHOUT LONG-TERM CURRENT USE OF INSULIN (HCC): ICD-10-CM

## 2024-07-03 DIAGNOSIS — Z00.00 MEDICARE ANNUAL WELLNESS VISIT, SUBSEQUENT: Primary | ICD-10-CM

## 2024-07-03 DIAGNOSIS — I48.91 NEW ONSET A-FIB (HCC): ICD-10-CM

## 2024-07-03 DIAGNOSIS — E78.2 MIXED HYPERLIPIDEMIA: ICD-10-CM

## 2024-07-03 DIAGNOSIS — N18.30 STAGE 3 CHRONIC KIDNEY DISEASE, UNSPECIFIED WHETHER STAGE 3A OR 3B CKD (HCC): ICD-10-CM

## 2024-07-03 PROCEDURE — G0439 PPPS, SUBSEQ VISIT: HCPCS | Performed by: NURSE PRACTITIONER

## 2024-07-03 PROCEDURE — 99214 OFFICE O/P EST MOD 30 MIN: CPT | Performed by: NURSE PRACTITIONER

## 2024-07-03 RX ORDER — PITAVASTATIN CALCIUM 2.09 MG/1
2 TABLET, FILM COATED ORAL
COMMUNITY
Start: 2024-05-17

## 2024-07-03 RX ORDER — ZOSTER VACCINE RECOMBINANT, ADJUVANTED 50 MCG/0.5
0.5 KIT INTRAMUSCULAR ONCE
Qty: 1 EACH | Refills: 1 | Status: SHIPPED | OUTPATIENT
Start: 2024-07-03 | End: 2024-07-03

## 2024-07-03 NOTE — ASSESSMENT & PLAN NOTE
Lab Results   Component Value Date    EGFR 58 (L) 07/01/2024    EGFR 60 07/05/2023    EGFR 69 04/04/2023    CREATININE 0.96 (H) 07/01/2024    CREATININE 0.93 07/05/2023    CREATININE 0.83 04/04/2023   Stable, no issues at this time.

## 2024-07-03 NOTE — ASSESSMENT & PLAN NOTE
Continue medications as directed.  Discussed diabetic diet in detail with patient in the office.  Recheck 3 months.     Lab Results   Component Value Date    HGBA1C 6.6 (H) 07/01/2024

## 2024-07-03 NOTE — PATIENT INSTRUCTIONS

## 2024-07-03 NOTE — PROGRESS NOTES
Ambulatory Visit  Name: Radha Cortes      : 1935      MRN: [id number]  Encounter Provider: EVANGELISTA Choi  Encounter Date: 7/3/2024   Encounter department: [facility]    Assessment & Plan   1. Medicare annual wellness visit, subsequent  Comments:  Age appropriate screenings and recommendations discussed. Fasting labs reviewed.  2. Type 2 diabetes mellitus with stage 3a chronic kidney disease, without long-term current use of insulin (HCC)  Assessment & Plan:  Continue medications as directed.  Discussed diabetic diet in detail with patient in the office.  Recheck 3 months.     Lab Results   Component Value Date    HGBA1C 6.6 (H) 2024     3. New onset a-fib ([facility])  Assessment & Plan:  Following up with cardiology  4. Stage 3 chronic kidney disease, unspecified whether stage 3a or 3b CKD ([facility])  Assessment & Plan:  Lab Results   Component Value Date    EGFR 58 (L) 2024    EGFR 60 2023    EGFR 69 2023    CREATININE 0.96 (H) 2024    CREATININE 0.93 2023    CREATININE 0.83 2023   Stable, no issues at this time.  5. Adjustment reaction with anxiety  Assessment & Plan:  Doing well overall. Continue medications as directed.   6. Mixed hyperlipidemia  7. Mixed stress and urge urinary incontinence  8. Need for shingles vaccine  -     Zoster Vac Recomb Adjuvanted (Shingrix) 50 MCG/0.5ML SUSR; Inject 0.5 mL into a muscle once for 1 dose Repeat dose in 2 to 6 months      Depression Screening and Follow-up Plan: Patient was screened for depression during today's encounter. They screened negative with a PHQ-2 score of 2.    Urinary Incontinence Plan of Care: counseling topics discussed: use restroom every 2 hours.       Preventive health issues were discussed with patient, and age appropriate screening tests were ordered as noted in patient's After Visit Summary. Personalized health advice and appropriate referrals for health education or  preventive services given if needed, as noted in patient's After Visit Summary.    History of Present Illness     Radha is an 88 year old female with diabetes, hyperlipidemia, anxiety, CKD, sleep disturbance, SVT and afib, who presents to the office for medicare annual wellness. Denies chest pain, shortness of breath, n/v/d.        Patient Care Team:  EVANGELISTA Choi as PCP - General (Family Medicine)  Alvaro Aguiar MD (Gastroenterology)  Clay Brock (Ophthalmology)  Laura Maradiaga MD (Gynecology)  Zeferino Delgado (Cardiology)  Hosea Butts MD (Ophthalmology)    Review of Systems   Constitutional:  Negative for diaphoresis, fatigue and fever.   HENT:  Negative for ear pain and hearing loss.    Eyes:  Negative for pain and visual disturbance.   Respiratory:  Negative for chest tightness and shortness of breath.    Cardiovascular:  Negative for chest pain, palpitations and leg swelling.   Gastrointestinal:  Negative for abdominal pain, constipation and diarrhea.   Genitourinary:  Negative for difficulty urinating.   Musculoskeletal:  Negative for arthralgias and myalgias.   Skin:  Negative for rash.   Neurological:  Negative for dizziness, numbness and headaches.   Psychiatric/Behavioral:  Negative for sleep disturbance. The patient is nervous/anxious.      Medical History Reviewed by provider this encounter:  Tobacco  Allergies  Meds  Problems  Med Hx  Surg Hx  Fam Hx       Annual Wellness Visit Questionnaire   Radha is here for her Subsequent Wellness visit. Last Medicare Wellness visit information reviewed, patient interviewed and updates made to the record today.      Health Risk Assessment:   Patient rates overall health as very good. Patient feels that their physical health rating is same. Patient is satisfied with their life. Eyesight was rated as slightly worse. Hearing was rated as slightly worse. Patient feels that their emotional and mental health rating is same. Patients states  they are never, rarely angry. Patient states they are always unusually tired/fatigued. Pain experienced in the last 7 days has been some. Patient's pain rating has been 3/10. Patient states that she has experienced no weight loss or gain in last 6 months.     Depression Screening:   PHQ-2 Score: 2      Fall Risk Screening:   In the past year, patient has experienced: no history of falling in past year      Urinary Incontinence Screening:   Patient has leaked urine accidently in the last six months.     Home Safety:  Patient has trouble with stairs inside or outside of their home. Patient has working smoke alarms and has working carbon monoxide detector. Home safety hazards include: none.     Nutrition:   Current diet is Regular.     Medications:   Patient is currently taking over-the-counter supplements. OTC medications include: see medication list. Patient is able to manage medications.     Activities of Daily Living (ADLs)/Instrumental Activities of Daily Living (IADLs):   Walk and transfer into and out of bed and chair?: Yes  Dress and groom yourself?: Yes    Bathe or shower yourself?: Yes    Feed yourself? Yes  Do your laundry/housekeeping?: Yes  Manage your money, pay your bills and track your expenses?: Yes  Make your own meals?: Yes    Do your own shopping?: Yes    Previous Hospitalizations:   Any hospitalizations or ED visits within the last 12 months?: Yes    How many hospitalizations have you had in the last year?: 1-2    Advance Care Planning:   Living will: Yes    Durable POA for healthcare: Yes    Advanced directive: Yes    Advanced directive counseling given: No    Five wishes given: No    Provider agrees with end of life decisions: Yes      Cognitive Screening:   Provider or family/friend/caregiver concerned regarding cognition?: No    PREVENTIVE SCREENINGS      Cardiovascular Screening:    General: Screening Not Indicated and History Lipid Disorder      Diabetes Screening:     General: Screening Not  "Indicated and History Diabetes      Colorectal Cancer Screening:     General: Screening Not Indicated      Cervical Cancer Screening:    General: Screening Not Indicated      Lung Cancer Screening:     General: Screening Not Indicated    Screening, Brief Intervention, and Referral to Treatment (SBIRT)    Screening  Typical number of drinks in a day: 0  Typical number of drinks in a week: 0  Interpretation: Low risk drinking behavior.    Single Item Drug Screening:  How often have you used an illegal drug (including marijuana) or a prescription medication for non-medical reasons in the past year? never    Single Item Drug Screen Score: 0  Interpretation: Negative screen for possible drug use disorder    Other Counseling Topics:   Car/seat belt/driving safety, skin self-exam, sunscreen and calcium and vitamin D intake and regular weightbearing exercise.     Social Determinants of Health     Financial Resource Strain: Low Risk  (4/12/2023)    Overall Financial Resource Strain (CARDIA)     Difficulty of Paying Living Expenses: Not hard at all   Food Insecurity: No Food Insecurity (7/3/2024)    Hunger Vital Sign     Worried About Running Out of Food in the Last Year: Never true     Ran Out of Food in the Last Year: Never true   Transportation Needs: No Transportation Needs (7/3/2024)    PRAPARE - Transportation     Lack of Transportation (Medical): No     Lack of Transportation (Non-Medical): No   Housing Stability: Low Risk  (7/3/2024)    Housing Stability Vital Sign     Unable to Pay for Housing in the Last Year: No     Number of Times Moved in the Last Year: 1     Homeless in the Last Year: No   Utilities: Not At Risk (7/3/2024)    [facility] Utilities     Threatened with loss of utilities: No     No results found.    Objective     /60 (BP Location: Left arm, Patient Position: Sitting, Cuff Size: Standard)   Pulse 63   Temp 97.9 °F (36.6 °C) (Temporal)   Resp 16   Ht 5' 2\" (1.575 m)   Wt 65.8 kg (145 lb)   SpO2 " 99%   BMI 26.52 kg/m²     Physical Exam  Vitals reviewed.   Constitutional:       Appearance: Normal appearance.   HENT:      Head: Normocephalic and atraumatic.   Neck:      Vascular: No carotid bruit.   Cardiovascular:      Rate and Rhythm: Normal rate and regular rhythm.      Pulses: Normal pulses.      Heart sounds: Normal heart sounds, S1 normal and S2 normal.   Pulmonary:      Effort: Pulmonary effort is normal.      Breath sounds: Normal breath sounds.   Musculoskeletal:      Cervical back: Full passive range of motion without pain.      Right lower leg: No edema.      Left lower leg: No edema.   Neurological:      Mental Status: She is alert and oriented to person, place, and time.   Psychiatric:         Mood and Affect: Mood normal.       Administrative Statements   I have spent a total time of 30 minutes in caring for this patient on the day of the visit/encounter including Instructions for management, Patient and family education, Risk factor reductions, Impressions, and Counseling / Coordination of care.

## 2024-07-15 DIAGNOSIS — F41.9 ANXIETY: ICD-10-CM

## 2024-07-15 RX ORDER — ALPRAZOLAM 0.5 MG/1
0.5 TABLET ORAL
Qty: 30 TABLET | Refills: 0 | Status: SHIPPED | OUTPATIENT
Start: 2024-07-15

## 2024-07-15 NOTE — TELEPHONE ENCOUNTER
Reason for call:   [x] Refill   [] Prior Auth  [] Other:     Office:   [x] PCP/Provider -  EVANGELISTA Choi - [facility]   [] Specialty/Provider -     Medication: ALPRAZolam (XANAX) 0.5 mg tablet       Take 1 tablet (0.5 mg total) by mouth daily at bedtime as needed for anxiety       Pharmacy:  [facility] [id number] - [place] [address] [address]     Does the patient have enough for 3 days?   [x] Yes   [] No - Send as HP to POD

## 2024-08-02 ENCOUNTER — NURSE TRIAGE (OUTPATIENT)
Age: 89
End: 2024-08-02

## 2024-08-02 NOTE — TELEPHONE ENCOUNTER
"Reason for Disposition  • New or worsening memory (forgetfulness) problems    Answer Assessment - Initial Assessment Questions  1. MAIN CONCERN OR SYMPTOM:  \"What is your main concern right now?\" \"What questions do you have?\" \"What's the main symptom you're worried about?\" (e.g., confusion, memory loss)      DAUGHTER CALLED AND STATED PATIENT IS SMELLING DIFFERENT SCENTS (SMOKE)  2. ONSET:  \"When did the symptom start (or worsen)?\" (minutes, hours, days, weeks)      SINCE DECEMBER   3. BETTER-SAME-WORSE: \"Are you getting better, staying the same, or getting worse compared to the day you were discharged?\"      GETTING WORSE   4. DIAGNOSIS: \"Was patient diagnosed with dementia by a doctor?\" If Yes, ask: \"When?\" (e.g., days, months, years ago)      NO  5. MEDICATION: \"Has there been any change in medications recently?\" (e.g., narcotics, antihistamines, benzodiazepines, etc.)      NO   6. OTHER SYMPTOMS: \"Are there any other symptoms?\" (e.g., fever, cough, pain, falling)     PATIENT DENIES ALL ABOVE SYMPTOMS    7. SUPPORT: Document living circumstances and support (e.g., family, nursing home)      YES,DAUGHTER CALLED AND STATED IS SMELLING DIFFERENT SCENTS(SMOKE)  THINKS NEIGHBOR IS OUT TO GET HER,POSSIBLE SUN DOWNERS  PATIENT CALLED DAUGHTER LAST NIGHT AND STATED SHE SMELLS SMOKE ,DAUGHTER AND SON IN LAW WENT TO THE HOUSE,DAUGHTER STATED NO SMELL WAS DETECTED.PATIENT HAS BEEN HAVING VIVID DREAMS ,SEEING BATS IN THE HOUSE  PATIENT LIVES ALONE  DAUGHTER IS ON VACATION NEXT WEEK   APPT SCHEDULED 8/14    Protocols used: Dementia Symptoms and Questions-ADULT-    "

## 2024-08-06 ENCOUNTER — RA CDI HCC (OUTPATIENT)
Dept: OTHER | Facility: HOSPITAL | Age: 89
End: 2024-08-06

## 2024-08-09 ENCOUNTER — TELEPHONE (OUTPATIENT)
Dept: ADMINISTRATIVE | Facility: OTHER | Age: 89
End: 2024-08-09

## 2024-08-09 NOTE — TELEPHONE ENCOUNTER
08/09/24 12:12 PM    Patient contacted to bring Advance Directive, POLST, or Living Will document to next scheduled pcp visit.VBI Department spoke with patient/ caregiver.    Thank you.  Tre Schmidt MA  [facility]

## 2024-08-14 ENCOUNTER — TELEPHONE (OUTPATIENT)
Dept: FAMILY MEDICINE CLINIC | Facility: CLINIC | Age: 89
End: 2024-08-14

## 2024-08-14 ENCOUNTER — TELEPHONE (OUTPATIENT)
Age: 89
End: 2024-08-14

## 2024-08-14 ENCOUNTER — OFFICE VISIT (OUTPATIENT)
Dept: FAMILY MEDICINE CLINIC | Facility: CLINIC | Age: 89
End: 2024-08-14
Payer: COMMERCIAL

## 2024-08-14 VITALS
SYSTOLIC BLOOD PRESSURE: 148 MMHG | DIASTOLIC BLOOD PRESSURE: 80 MMHG | HEART RATE: 72 BPM | RESPIRATION RATE: 16 BRPM | HEIGHT: 62 IN | WEIGHT: 143 LBS | TEMPERATURE: 97 F | OXYGEN SATURATION: 97 % | BODY MASS INDEX: 26.31 KG/M2

## 2024-08-14 DIAGNOSIS — N18.31 TYPE 2 DIABETES MELLITUS WITH STAGE 3A CHRONIC KIDNEY DISEASE, WITHOUT LONG-TERM CURRENT USE OF INSULIN (HCC): ICD-10-CM

## 2024-08-14 DIAGNOSIS — E78.2 MIXED HYPERLIPIDEMIA: ICD-10-CM

## 2024-08-14 DIAGNOSIS — F41.9 ANXIETY: ICD-10-CM

## 2024-08-14 DIAGNOSIS — R41.82 ALTERED MENTAL STATUS, UNSPECIFIED ALTERED MENTAL STATUS TYPE: Primary | ICD-10-CM

## 2024-08-14 DIAGNOSIS — I48.91 NEW ONSET A-FIB (HCC): ICD-10-CM

## 2024-08-14 DIAGNOSIS — R43.1 ABNORMAL SMELL: ICD-10-CM

## 2024-08-14 DIAGNOSIS — E11.22 TYPE 2 DIABETES MELLITUS WITH STAGE 3A CHRONIC KIDNEY DISEASE, WITHOUT LONG-TERM CURRENT USE OF INSULIN (HCC): ICD-10-CM

## 2024-08-14 PROCEDURE — G2211 COMPLEX E/M VISIT ADD ON: HCPCS | Performed by: FAMILY MEDICINE

## 2024-08-14 PROCEDURE — 99214 OFFICE O/P EST MOD 30 MIN: CPT | Performed by: FAMILY MEDICINE

## 2024-08-14 RX ORDER — ALPRAZOLAM 0.5 MG
0.5 TABLET ORAL
Qty: 30 TABLET | Refills: 0 | Status: SHIPPED | OUTPATIENT
Start: 2024-08-14

## 2024-08-14 NOTE — TELEPHONE ENCOUNTER
[facility] calling to state Radha has an apt on August 30th for MRI of brain.  They need an authorization.    NPI [id number]    Please fax to [phone]  Call for any questions [phone]    Thank you

## 2024-08-14 NOTE — TELEPHONE ENCOUNTER
MRI brain w & wo  Appt 8/21/24  [facility] [address] [place] [address] [phone]  NPI [id number]  Please call daughter Lisa [phone]

## 2024-08-14 NOTE — PROGRESS NOTES
Ambulatory Visit  Name: Radha Cortes      : 1935      MRN: [id number]  Encounter Provider: Ghanshyam Roberts MD  Encounter Date: 2024   Encounter department: [facility]    Assessment & Plan   1. Altered mental status, unspecified altered mental status type  -     MRI brain w wo contrast; Future; Expected date: 2024  2. Abnormal smell  -     MRI brain w wo contrast; Future; Expected date: 2024  3. Type 2 diabetes mellitus with stage 3a chronic kidney disease, without long-term current use of insulin (HCC)  -     MRI brain w wo contrast; Future; Expected date: 2024  4. New onset a-fib (HCC)  -     MRI brain w wo contrast; Future; Expected date: 2024  5. Mixed hyperlipidemia         History of Present Illness     PATIENT COMPLAINS OF ABNORMAL SENSORY SENSATION  X 6-7 MONTHS  GETTING WORSE  USUALLY AT NIGHT  BURNING / ACRID SMELL  KEEPS HER AWAKE AT NIGHT  NOT PRESENT DURING THE DAY  ? TIMES SEEING THINGS THAT ARE NOT THERE ACCORDING TO DAUGHTER    DENIES ANY CP, SOB, PALPITATIONS  NOTES NO WEAKNESS OR SPEECH DIFFICULTY  HX OF ATRIAL FIBRILLATION ON ANTICOAGULATION    REVIEW OF OLD CT SCAN SHOWS SOME WHITE MATTER CHANGES  NO RECENT ILLNESS OR TRAUMA      Review of Systems   Constitutional:  Negative for chills, fatigue and fever.   HENT:  Negative for congestion, ear discharge, ear pain, mouth sores, postnasal drip, sore throat and trouble swallowing.    Eyes:  Negative for pain, discharge and visual disturbance.   Respiratory:  Negative for cough, shortness of breath and wheezing.    Cardiovascular:  Negative for chest pain, palpitations and leg swelling.   Gastrointestinal:  Negative for abdominal distention, abdominal pain, blood in stool, diarrhea and nausea.   Endocrine: Negative for polydipsia, polyphagia and polyuria.   Genitourinary:  Negative for dysuria, frequency, hematuria and urgency.   Musculoskeletal:  Positive for arthralgias. Negative  for gait problem and joint swelling.   Skin:  Negative for pallor and rash.   Neurological:  Negative for dizziness, syncope, speech difficulty, weakness, light-headedness, numbness and headaches.   Hematological:  Negative for adenopathy.   Psychiatric/Behavioral:  Positive for hallucinations and sleep disturbance. Negative for behavioral problems and confusion. The patient is nervous/anxious.      Past Medical History:   Diagnosis Date   • Hypertension    • Osteoarthritis      Past Surgical History:   Procedure Laterality Date   • CATARACT EXTRACTION, BILATERAL Bilateral     Left: 11/4/19  Rgiht 12/16/19   • NO PAST SURGERIES       Family History   Problem Relation Age of Onset   • Heart attack Father    • Heart disease Sister    • Diabetes Sister    • Breast cancer Sister    • Heart disease Brother    • Substance Abuse Neg Hx    • Mental illness Neg Hx      Social History     Tobacco Use   • Smoking status: Never     Passive exposure: Never   • Smokeless tobacco: Never   Vaping Use   • Vaping status: Never Used   Substance and Sexual Activity   • Alcohol use: No   • Drug use: No   • Sexual activity: Not Currently     Current Outpatient Medications on File Prior to Visit   Medication Sig   • ALPRAZolam (XANAX) 0.5 mg tablet Take 1 tablet (0.5 mg total) by mouth daily at bedtime as needed for anxiety   • b complex vitamins capsule Take 1 capsule by mouth daily   • Eliquis 5 MG Take 5 mg by mouth 2 (two) times a day 1 in AM, 1 in PM   • FLUoxetine (PROzac) 10 mg capsule Take 1 capsule (10 mg total) by mouth daily   • Lancets (OneTouch Delica Plus [id number]) MISC TEST TWO TIMES A DAY BEFORE BREAKFAST AND BEFORE DINNER   • metFORMIN (GLUCOPHAGE) 500 mg tablet TAKE 1 TABLET BY MOUTH TWICE  DAILY   • metoprolol succinate (TOPROL-XL) 25 mg 24 hr tablet Take 25 mg by mouth 2 (two) times a day   • OneTouch Ultra test strip TEST TWO TIMES A DAY   • Pepcid 40 MG tablet Take 40 mg by mouth 2 (two) times a day   • pitavastatin  "(LIVALO) 2 mg Take 2 mg by mouth daily with dinner   • polyethylene glycol-propylene glycol (Systane) 0.4-0.3 % both eyes four times daily   • triamcinolone (KENALOG) 0.1 % cream as needed   • ONE TOUCH LANCETS MISC by Does not apply route (Patient not taking: Reported on 8/14/2024)   • [DISCONTINUED] Protonix 40 MG tablet Take 40 mg by mouth daily (Patient not taking: Reported on 7/3/2024)     Allergies   Allergen Reactions   • Sulfamethoxazole-Trimethoprim Vomiting     BACTRIM     Immunization History   Administered Date(s) Administered   • COVID-19 MODERNA VACC 0.5 ML IM 04/07/2021, 05/05/2021, 11/22/2021   • INFLUENZA 10/13/2019, 09/24/2020, 10/29/2021   • Influenza Split High Dose Preservative Free IM 10/22/2015, 09/14/2016, 09/06/2017   • Influenza, high dose seasonal 0.7 mL 10/16/2018, 09/24/2020, 10/29/2021   • Influenza, seasonal, injectable 10/29/2014   • Pneumococcal Conjugate 13-Valent 03/08/2017   • Pneumococcal Polysaccharide PPV23 10/21/2014   • TD (adult) Preservative Free 06/07/2018   • Tdap 11/21/2008     Objective     /80 (BP Location: Left arm, Patient Position: Sitting, Cuff Size: Large)   Pulse 72   Temp (!) 97 °F (36.1 °C) (Temporal)   Resp 16   Ht 5' 2\" (1.575 m)   Wt 64.9 kg (143 lb)   SpO2 97%   BMI 26.16 kg/m²     Physical Exam  Constitutional:       General: She is not in acute distress.     Appearance: Normal appearance. She is well-developed and normal weight. She is not ill-appearing.   HENT:      Head: Normocephalic and atraumatic.      Nose: Nose normal.      Mouth/Throat:      Mouth: Mucous membranes are moist.   Eyes:      General:         Right eye: No discharge.         Left eye: No discharge.      Conjunctiva/sclera: Conjunctivae normal.      Pupils: Pupils are equal, round, and reactive to light.   Neck:      Thyroid: No thyromegaly.   Cardiovascular:      Rate and Rhythm: Normal rate and regular rhythm.      Heart sounds: Normal heart sounds. No murmur " heard.  Pulmonary:      Effort: Pulmonary effort is normal. No respiratory distress.      Breath sounds: Normal breath sounds. No wheezing or rales.   Abdominal:      General: Bowel sounds are normal.      Palpations: Abdomen is soft.      Tenderness: There is no abdominal tenderness.   Musculoskeletal:         General: No tenderness.      Cervical back: Normal range of motion and neck supple.      Comments: MODERATE DJD CHANGES     Lymphadenopathy:      Cervical: No cervical adenopathy.   Skin:     General: Skin is warm and dry.      Findings: No erythema or rash.   Neurological:      General: No focal deficit present.      Mental Status: She is alert and oriented to person, place, and time.      Cranial Nerves: No cranial nerve deficit.      Sensory: No sensory deficit.      Motor: No weakness.      Coordination: Coordination normal.      Gait: Gait abnormal.      Deep Tendon Reflexes: Reflexes normal.   Psychiatric:         Behavior: Behavior normal.         Thought Content: Thought content normal.         Judgment: Judgment normal.

## 2024-08-15 ENCOUNTER — TELEPHONE (OUTPATIENT)
Age: 89
End: 2024-08-15

## 2024-08-15 NOTE — TELEPHONE ENCOUNTER
The patient called because she received a call and wants to know if it was someone from the office.    Please contact the patient

## 2024-08-20 DIAGNOSIS — E11.9 TYPE 2 DIABETES MELLITUS WITHOUT COMPLICATION, WITHOUT LONG-TERM CURRENT USE OF INSULIN (HCC): ICD-10-CM

## 2024-08-30 DIAGNOSIS — E11.9 TYPE 2 DIABETES MELLITUS WITHOUT COMPLICATION, WITHOUT LONG-TERM CURRENT USE OF INSULIN (HCC): ICD-10-CM

## 2024-08-30 RX ORDER — BLOOD SUGAR DIAGNOSTIC
STRIP MISCELLANEOUS
Qty: 100 STRIP | Refills: 4 | Status: SHIPPED | OUTPATIENT
Start: 2024-08-30

## 2024-08-30 NOTE — TELEPHONE ENCOUNTER
Patient called requesting refill for OneTouch Ultra test strip . Patient made aware medication was refilled on 1/2/24 for 100 strip with 4 refills to [facility][id number] - [place], [place] - [address] [place] [address]  pharmacy. Patient instructed to contact the pharmacy to obtain refills of medication. Patient verbalized understanding.

## 2024-09-11 ENCOUNTER — TELEPHONE (OUTPATIENT)
Age: 89
End: 2024-09-11

## 2024-09-12 NOTE — TELEPHONE ENCOUNTER
PLEASE CALL PAT    REVIEWED MRI REPORT  NO EVIDENCE OF A STROKE  DOES SHOW SOME CHRONIC VASCULAR CHANGES DUE TO AGING WHICH CAN EFFECT MEMORY    NO REAL CONCERNS  WE CAN DISCUSS THE NEXT TIME SHE COMES IN    THANKS

## 2024-09-13 DIAGNOSIS — F41.9 ANXIETY: ICD-10-CM

## 2024-09-13 NOTE — TELEPHONE ENCOUNTER
Reason for call:   [x] Refill   [] Prior Auth  [] Other:     Office:   [x] PCP/Provider -   [] Specialty/Provider -     Medication: ALPRAZolam (XANAX) 0.5 mg tablet  Take 1 tablet (0.5 mg total) by mouth daily at bedtime as needed for anxiety         Pharmacy: [facility] [address][id number] - [place] [address] [address]      Does the patient have enough for 3 days?   [] Yes   [x] No - Send as HP to POD

## 2024-09-15 RX ORDER — ALPRAZOLAM 0.5 MG
0.5 TABLET ORAL
Qty: 30 TABLET | Refills: 0 | Status: SHIPPED | OUTPATIENT
Start: 2024-09-15

## 2024-09-19 ENCOUNTER — TELEPHONE (OUTPATIENT)
Age: 89
End: 2024-09-19

## 2024-09-19 NOTE — TELEPHONE ENCOUNTER
This study was ordered by Dr. Roberts at his last visit with her. The MRI looks OK but should discuss with Dr. Roberts in detail. She is scheduled to be seen on 10/3. Does she want to discuss sooner?

## 2024-09-19 NOTE — TELEPHONE ENCOUNTER
Spoke to Aileen and relayed Billie's message.      She stated that she did get a call from the office regarding the MRI results.  She even told me about the results.    I looked at the  9/11 telephone call and there was the results.    I checked the communication consent form and there is none.  It needs to be update.  I explained the Lisa that I am calling her back to acknowledge her call, but cannot speak to her.  She said she was on the form because she does everything for her mom  (There is no form)    Aileen has an appt on 10/3.  I told her we will make sure she completes the form.

## 2024-09-19 NOTE — TELEPHONE ENCOUNTER
Patients daughter Lisa is calling in for MRI of the head results.  This needs to be reviewed by Billie.      Lisa would like a call back.

## 2024-09-26 ENCOUNTER — RA CDI HCC (OUTPATIENT)
Dept: OTHER | Facility: HOSPITAL | Age: 89
End: 2024-09-26

## 2024-10-02 ENCOUNTER — TELEPHONE (OUTPATIENT)
Dept: ADMINISTRATIVE | Facility: OTHER | Age: 89
End: 2024-10-02

## 2024-10-02 NOTE — TELEPHONE ENCOUNTER
10/02/24 10:26 AM    Patient contacted to bring Advance Directive, POLST, or Living Will document to next scheduled pcp visit.[facility] Department spoke with patient/ caregiver.    Thank you.  Chioma Zamarripa  [facility]

## 2024-10-03 ENCOUNTER — OFFICE VISIT (OUTPATIENT)
Dept: FAMILY MEDICINE CLINIC | Facility: CLINIC | Age: 89
End: 2024-10-03
Payer: COMMERCIAL

## 2024-10-03 VITALS
RESPIRATION RATE: 16 BRPM | SYSTOLIC BLOOD PRESSURE: 126 MMHG | DIASTOLIC BLOOD PRESSURE: 76 MMHG | HEIGHT: 62 IN | OXYGEN SATURATION: 97 % | BODY MASS INDEX: 25.76 KG/M2 | WEIGHT: 140 LBS | TEMPERATURE: 97.6 F | HEART RATE: 83 BPM

## 2024-10-03 DIAGNOSIS — R41.3 MEMORY IMPAIRMENT: ICD-10-CM

## 2024-10-03 DIAGNOSIS — Z23 ENCOUNTER FOR IMMUNIZATION: ICD-10-CM

## 2024-10-03 DIAGNOSIS — N18.31 TYPE 2 DIABETES MELLITUS WITH STAGE 3A CHRONIC KIDNEY DISEASE, WITHOUT LONG-TERM CURRENT USE OF INSULIN (HCC): Primary | ICD-10-CM

## 2024-10-03 DIAGNOSIS — R44.3 HALLUCINATIONS: ICD-10-CM

## 2024-10-03 DIAGNOSIS — E11.22 TYPE 2 DIABETES MELLITUS WITH STAGE 3A CHRONIC KIDNEY DISEASE, WITHOUT LONG-TERM CURRENT USE OF INSULIN (HCC): Primary | ICD-10-CM

## 2024-10-03 LAB — SL AMB POCT HEMOGLOBIN AIC: 6.2 (ref ?–6.5)

## 2024-10-03 PROCEDURE — 83036 HEMOGLOBIN GLYCOSYLATED A1C: CPT | Performed by: NURSE PRACTITIONER

## 2024-10-03 PROCEDURE — G0008 ADMIN INFLUENZA VIRUS VAC: HCPCS

## 2024-10-03 PROCEDURE — 90662 IIV NO PRSV INCREASED AG IM: CPT

## 2024-10-03 PROCEDURE — 99214 OFFICE O/P EST MOD 30 MIN: CPT | Performed by: NURSE PRACTITIONER

## 2024-10-03 NOTE — Clinical Note
Please notify pt's daughter, that I have placed an order for neuro for pt to be evaluated further in regards to symptoms discussed at previous appointment. Before you call, can you call me on teams?

## 2024-10-03 NOTE — PROGRESS NOTES
Assessment/Plan:    1. Type 2 diabetes mellitus with stage 3a chronic kidney disease, without long-term current use of insulin (HCC)  Assessment & Plan:  Pt doing well overall.  Continue diabetic diet and exercise as tolerated.   Recheck 3 months.     Lab Results   Component Value Date    HGBA1C 6.2 10/03/2024     Orders:  -     POCT hemoglobin A1c  2. Encounter for immunization  -     influenza vaccine, high-dose, PF 0.5 mL (Fluzone High Dose)  3. Hallucinations  Assessment & Plan:  Suspect pt is having olfactory, visual and auditory hallucinations. Pt's daughter states that she does not smell, hear or see the things that her mother is experiencing.   Discussed in length with pt and pt's daughter.  We discussed fall risks and safety precautions. Reviewed recent MRI.  May consider medication management.   Consult neurology for further evaluation of dementia.   Orders:  -     Ambulatory Referral to Neurology; Future  4. Memory impairment  -     Ambulatory Referral to Neurology; Future          There are no Patient Instructions on file for this visit.    No follow-ups on file.    Subjective:      Patient ID: Radha Cortes is a 89 y.o. female.    Chief Complaint   Patient presents with    Diabetes     3 month re-check A1C and annual foot exam RH       Radha is an 89 year old female with diabetes II, anxiety, depression, hyperlipidemia, CKD III and afib who presents to the office, accompanied by her daughter, for evaluation and management of diabetes. Pt's daughter assisted in providing the history. Reports that she has been smelling a foul odor in her home causing her to get up in the middle of the night to change her sheets and pillow cases. States that the smell is not only in her home, but she feels that the smell is on her. Reports that she also hears voices and sometimes sees things. States that she hears people walking in her house. Pt's daughter reports that her mother has thought that her grandson was  stealing things out of her home. Reports increasing stress with family relationships due to symptoms. Pt does admit to forgetfulness. Denies any financial issue or difficulty paying her bills.         The following portions of the patient's history were reviewed and updated as appropriate: allergies, current medications, past family history, past medical history, past social history, past surgical history and problem list.    Review of Systems   Constitutional:  Negative for chills, fatigue and fever.   Respiratory:  Negative for cough, chest tightness and shortness of breath.    Cardiovascular:  Negative for chest pain.   Psychiatric/Behavioral:  Positive for confusion.         Olfactory, vision and auditory hallucinations         Current Outpatient Medications   Medication Sig Dispense Refill    ALPRAZolam (XANAX) 0.5 mg tablet Take 1 tablet (0.5 mg total) by mouth daily at bedtime as needed for anxiety 30 tablet 0    b complex vitamins capsule Take 1 capsule by mouth daily      Eliquis 5 MG Take 5 mg by mouth 2 (two) times a day 1 in AM, 1 in PM      Lancets (OneTouch Delica Plus Mnduvb27O) MISC TEST TWO TIMES A DAY BEFORE BREAKFAST AND BEFORE DINNER 100 each 5    metFORMIN (GLUCOPHAGE) 500 mg tablet TAKE 1 TABLET BY MOUTH TWICE  DAILY 180 tablet 1    metoprolol succinate (TOPROL-XL) 25 mg 24 hr tablet Take 25 mg by mouth 2 (two) times a day      OneTouch Ultra test strip TEST TWO TIMES A  strip 4    Pepcid 40 MG tablet Take 40 mg by mouth 2 (two) times a day      pitavastatin (LIVALO) 2 mg Take 2 mg by mouth daily with dinner      polyethylene glycol-propylene glycol (Systane) 0.4-0.3 % both eyes four times daily      triamcinolone (KENALOG) 0.1 % cream as needed      FLUoxetine (PROzac) 10 mg capsule TAKE ONE CAPSULE BY MOUTH EVERY DAY 90 capsule 1    ONE TOUCH LANCETS MISC by Does not apply route (Patient not taking: Reported on 10/3/2024)       No current facility-administered medications for this  "visit.       Objective:    /76 (BP Location: Left arm, Patient Position: Sitting, Cuff Size: Standard)   Pulse 83   Temp 97.6 °F (36.4 °C) (Temporal)   Resp 16   Ht 5' 2\" (1.575 m)   Wt 63.5 kg (140 lb)   SpO2 97%   BMI 25.61 kg/m²        Physical Exam  Vitals reviewed.   Constitutional:       Appearance: Normal appearance.   HENT:      Head: Normocephalic and atraumatic.   Cardiovascular:      Rate and Rhythm: Normal rate and regular rhythm.      Pulses: no weak pulses.           Dorsalis pedis pulses are 2+ on the right side and 2+ on the left side.        Posterior tibial pulses are 2+ on the right side and 2+ on the left side.      Heart sounds: Normal heart sounds.   Pulmonary:      Effort: Pulmonary effort is normal.      Breath sounds: Normal breath sounds.   Feet:      Right foot:      Skin integrity: No ulcer, skin breakdown, erythema, warmth, callus or dry skin.      Left foot:      Skin integrity: No ulcer, skin breakdown, erythema, warmth, callus or dry skin.   Neurological:      Mental Status: She is alert and oriented to person, place, and time.   Psychiatric:         Mood and Affect: Mood normal.         Speech: Speech normal.         Behavior: Behavior normal.         Thought Content: Thought content normal.           Patient's shoes and socks removed.    Right Foot/Ankle   Right Foot Inspection  Skin Exam: skin normal and skin intact. No dry skin, no warmth, no callus, no erythema, no maceration, no abnormal color, no pre-ulcer, no ulcer and no callus.     Toe Exam: ROM and strength within normal limits.     Sensory   Vibration: intact  Proprioception: intact  Monofilament testing: intact    Vascular  Capillary refills: < 3 seconds  The right DP pulse is 2+. The right PT pulse is 2+.     Left Foot/Ankle  Left Foot Inspection  Skin Exam: skin normal and skin intact. No dry skin, no warmth, no erythema, no maceration, normal color, no pre-ulcer, no ulcer and no callus.     Toe Exam: ROM " and strength within normal limits.     Sensory   Vibration: intact  Proprioception: intact  Monofilament testing: intact    Vascular  Capillary refills: < 3 seconds  The left DP pulse is 2+. The left PT pulse is 2+.     Assign Risk Category  No deformity present  No loss of protective sensation  No weak pulses  Risk: 0         EVANGELISTA Choi

## 2024-10-05 DIAGNOSIS — F43.22 ADJUSTMENT REACTION WITH ANXIETY: ICD-10-CM

## 2024-10-05 RX ORDER — FLUOXETINE 10 MG/1
10 CAPSULE ORAL DAILY
Qty: 90 CAPSULE | Refills: 1 | Status: SHIPPED | OUTPATIENT
Start: 2024-10-05

## 2024-10-10 ENCOUNTER — TELEPHONE (OUTPATIENT)
Dept: OTHER | Facility: OTHER | Age: 89
End: 2024-10-10

## 2024-10-10 PROBLEM — R44.3 HALLUCINATIONS: Status: ACTIVE | Noted: 2024-10-10

## 2024-10-10 NOTE — ASSESSMENT & PLAN NOTE
Suspect pt is having olfactory, visual and auditory hallucinations. Pt's daughter states that she does not smell, hear or see the things that her mother is experiencing.   Discussed in length with pt and pt's daughter.  We discussed fall risks and safety precautions. Reviewed recent MRI.  May consider medication management.   Consult neurology for further evaluation of dementia.

## 2024-10-10 NOTE — TELEPHONE ENCOUNTER
Patient's daughter was advised of that referral to [facility] was placed, triage RN provided the information of the Neurology practice. Patient's daughter stated that she would prefer patient to go to neurology practice outside of [facility].

## 2024-10-10 NOTE — ASSESSMENT & PLAN NOTE
Pt doing well overall.  Continue diabetic diet and exercise as tolerated.   Recheck 3 months.     Lab Results   Component Value Date    HGBA1C 6.2 10/03/2024

## 2024-10-10 NOTE — PROGRESS NOTES
Spoke to Billie - Billie did not think that the Emergency contact is the daughter that was here in the office with Pat last week.    I think? That I asked her on check out if she was Lisa and I think she said yes.  I looked at the communication consent for that was completed last week was done by the daughter that was here.    LM for Lisa, relaying Billie's information

## 2024-10-14 DIAGNOSIS — F41.9 ANXIETY: ICD-10-CM

## 2024-10-14 RX ORDER — ALPRAZOLAM 0.5 MG
0.5 TABLET ORAL
Qty: 30 TABLET | Refills: 0 | Status: SHIPPED | OUTPATIENT
Start: 2024-10-14

## 2024-10-14 NOTE — TELEPHONE ENCOUNTER
Reason for call:   [x] Refill   [] Prior Auth  [] Other:     Office: [facility]    [x] PCP/Provider -  Ghanshyam Roberts   [] Specialty/Provider -     Medication: alprazolam     Dose/Frequency: 0.5 mg/ daily PRN     Quantity: 30 day supply     Pharmacy: [facility]     Does the patient have enough for 3 days?   [x] Yes   [] No - Send as HP to POD

## 2024-11-14 DIAGNOSIS — F41.9 ANXIETY: ICD-10-CM

## 2024-11-14 RX ORDER — ALPRAZOLAM 0.5 MG
0.5 TABLET ORAL
Qty: 30 TABLET | Refills: 0 | Status: SHIPPED | OUTPATIENT
Start: 2024-11-14

## 2024-11-14 NOTE — TELEPHONE ENCOUNTER
Reason for call:   [x] Refill   [] Prior Auth  [] Other:     Office:   [x] PCP/Provider - Billie Cota  [] Specialty/Provider -     Medication: alprazolam     Dose/Frequency: 0.5 mg take daily at bedtime as needed    Quantity: 30    Pharmacy: [facility]    Does the patient have enough for 3 days?   [x] Yes - pt has 3 pills left but her daughter is going into [facility] so she'd like this sent today if possible since her daughter will be in town  [] No - Send as HP to POD

## 2024-12-13 DIAGNOSIS — F41.9 ANXIETY: ICD-10-CM

## 2024-12-13 RX ORDER — ALPRAZOLAM 0.5 MG
TABLET ORAL
Qty: 30 TABLET | Refills: 0 | Status: SHIPPED | OUTPATIENT
Start: 2024-12-13

## 2024-12-31 DIAGNOSIS — E11.9 TYPE 2 DIABETES MELLITUS WITHOUT COMPLICATION, WITHOUT LONG-TERM CURRENT USE OF INSULIN (HCC): ICD-10-CM

## 2025-01-01 RX ORDER — LANCETS 30 GAUGE
EACH MISCELLANEOUS
Qty: 100 EACH | Refills: 5 | Status: SHIPPED | OUTPATIENT
Start: 2025-01-01

## 2025-01-02 ENCOUNTER — TELEPHONE (OUTPATIENT)
Dept: FAMILY MEDICINE CLINIC | Facility: CLINIC | Age: 89
End: 2025-01-02

## 2025-01-02 NOTE — TELEPHONE ENCOUNTER
Radha called as we were walking out of the office on Tuesday 12/31/24    She is requesting last OV Notes, lab work and MRI if there is one    She stated that they have requested before.  I informed her that I am not seeing a message in her chart requesting this information    Informed her that we were on the way out of the office and will fax on Thursday when I return

## 2025-01-14 DIAGNOSIS — F41.9 ANXIETY: ICD-10-CM

## 2025-01-14 RX ORDER — ALPRAZOLAM 0.5 MG
0.5 TABLET ORAL
Qty: 30 TABLET | Refills: 0 | Status: SHIPPED | OUTPATIENT
Start: 2025-01-14

## 2025-01-14 NOTE — TELEPHONE ENCOUNTER
Reason for call:   [x] Refill   [] Prior Auth  [] Other:     Office:   [x] PCP/Provider - [facility]  Authorized By: Ghanshyam Roberts MD    [] Specialty/Provider -     Medication: ALPRAZolam (XANAX) 0.5 mg tablet    Dose/Frequency: TAKE ONE TABLET BY MOUTH EVERY DAY AT BEDTIME AS NEEDED FOR ANXIETY,    Quantity: 30 tablet    Pharmacy: [id number] - [address]     Does the patient have enough for 3 days?   [] Yes   [x] No - Send as HP to POD

## 2025-01-15 DIAGNOSIS — E11.9 TYPE 2 DIABETES MELLITUS WITHOUT COMPLICATION, WITHOUT LONG-TERM CURRENT USE OF INSULIN (HCC): ICD-10-CM

## 2025-01-16 ENCOUNTER — RA CDI HCC (OUTPATIENT)
Dept: OTHER | Facility: HOSPITAL | Age: 89
End: 2025-01-16

## 2025-01-23 ENCOUNTER — OFFICE VISIT (OUTPATIENT)
Dept: FAMILY MEDICINE CLINIC | Facility: CLINIC | Age: 89
End: 2025-01-23
Payer: COMMERCIAL

## 2025-01-23 VITALS
DIASTOLIC BLOOD PRESSURE: 90 MMHG | TEMPERATURE: 94.7 F | HEIGHT: 62 IN | RESPIRATION RATE: 22 BRPM | SYSTOLIC BLOOD PRESSURE: 142 MMHG | OXYGEN SATURATION: 97 % | HEART RATE: 72 BPM | BODY MASS INDEX: 26.87 KG/M2 | WEIGHT: 146 LBS

## 2025-01-23 DIAGNOSIS — F43.22 ADJUSTMENT REACTION WITH ANXIETY: ICD-10-CM

## 2025-01-23 DIAGNOSIS — E11.9 TYPE 2 DIABETES MELLITUS WITHOUT COMPLICATION, WITHOUT LONG-TERM CURRENT USE OF INSULIN (HCC): ICD-10-CM

## 2025-01-23 DIAGNOSIS — N18.31 TYPE 2 DIABETES MELLITUS WITH STAGE 3A CHRONIC KIDNEY DISEASE, WITHOUT LONG-TERM CURRENT USE OF INSULIN (HCC): ICD-10-CM

## 2025-01-23 DIAGNOSIS — E78.2 MIXED HYPERLIPIDEMIA: Primary | ICD-10-CM

## 2025-01-23 DIAGNOSIS — I48.91 NEW ONSET A-FIB (HCC): ICD-10-CM

## 2025-01-23 DIAGNOSIS — K21.9 GASTROESOPHAGEAL REFLUX DISEASE WITHOUT ESOPHAGITIS: ICD-10-CM

## 2025-01-23 DIAGNOSIS — E11.22 TYPE 2 DIABETES MELLITUS WITH STAGE 3A CHRONIC KIDNEY DISEASE, WITHOUT LONG-TERM CURRENT USE OF INSULIN (HCC): ICD-10-CM

## 2025-01-23 LAB — SL AMB POCT HEMOGLOBIN AIC: 6.2 (ref ?–6.5)

## 2025-01-23 PROCEDURE — 83036 HEMOGLOBIN GLYCOSYLATED A1C: CPT | Performed by: STUDENT IN AN ORGANIZED HEALTH CARE EDUCATION/TRAINING PROGRAM

## 2025-01-23 PROCEDURE — 99214 OFFICE O/P EST MOD 30 MIN: CPT | Performed by: STUDENT IN AN ORGANIZED HEALTH CARE EDUCATION/TRAINING PROGRAM

## 2025-01-23 PROCEDURE — G2211 COMPLEX E/M VISIT ADD ON: HCPCS | Performed by: STUDENT IN AN ORGANIZED HEALTH CARE EDUCATION/TRAINING PROGRAM

## 2025-01-23 RX ORDER — FLUOXETINE 10 MG/1
10 CAPSULE ORAL DAILY
Qty: 90 CAPSULE | Refills: 1 | Status: SHIPPED | OUTPATIENT
Start: 2025-01-23

## 2025-01-23 RX ORDER — PITAVASTATIN CALCIUM 2.09 MG/1
2 TABLET, FILM COATED ORAL
Qty: 90 TABLET | Refills: 1 | Status: SHIPPED | OUTPATIENT
Start: 2025-01-23

## 2025-01-23 RX ORDER — FAMOTIDINE 40 MG/1
40 TABLET, FILM COATED ORAL 2 TIMES DAILY
Qty: 180 TABLET | Refills: 0 | Status: SHIPPED | OUTPATIENT
Start: 2025-01-23

## 2025-01-23 RX ORDER — LANCETS 33 GAUGE
EACH MISCELLANEOUS
Qty: 200 EACH | Refills: 3 | Status: SHIPPED | OUTPATIENT
Start: 2025-01-23

## 2025-01-23 RX ORDER — BLOOD-GLUCOSE METER
KIT MISCELLANEOUS
Qty: 1 KIT | Refills: 0 | Status: SHIPPED | OUTPATIENT
Start: 2025-01-23

## 2025-01-23 RX ORDER — BLOOD SUGAR DIAGNOSTIC
STRIP MISCELLANEOUS
Qty: 200 EACH | Refills: 3 | Status: SHIPPED | OUTPATIENT
Start: 2025-01-23

## 2025-01-23 NOTE — ASSESSMENT & PLAN NOTE
-Continue Pitavastatin 2 mg daily  Orders:  •  pitavastatin (LIVALO) 2 mg; Take 1 tablet (2 mg total) by mouth daily with dinner

## 2025-01-23 NOTE — PROGRESS NOTES
Name: Radha Cortes      : 1935      MRN: [id number]  Encounter Provider: Meeta Medel MD  Encounter Date: 2025   Encounter department: [facility]  :  Assessment & Plan  Mixed hyperlipidemia  -Continue Pitavastatin 2 mg daily  Orders:  •  pitavastatin (LIVALO) 2 mg; Take 1 tablet (2 mg total) by mouth daily with dinner    Type 2 diabetes mellitus with stage 3a chronic kidney disease, without long-term current use of insulin (HCC)    Lab Results   Component Value Date    HGBA1C 6.2 2025     -Continue metformin 500 mg BID  Orders:  •  POCT hemoglobin A1c  •  Blood Glucose Monitoring Suppl (OneTouch Verio Reflect) w/Device KIT; Check blood sugars twice daily. Please substitute with appropriate alternative as covered by patient's insurance. Dx: E11.65  •  glucose blood (OneTouch Verio) test strip; Check blood sugars twice daily. Please substitute with appropriate alternative as covered by patient's insurance. Dx: E11.65  •  OneTouch Delica Lancets 33G MISC; Check blood sugars twice daily. Please substitute with appropriate alternative as covered by patient's insurance. Dx: E11.65    New onset a-fib (HCC)  -Continue Elliquis 5 mg BID       Type 2 diabetes mellitus without complication, without long-term current use of insulin (HCC)    Lab Results   Component Value Date    HGBA1C 6.2 2025     Orders:  •  metFORMIN (GLUCOPHAGE) 500 mg tablet; Take 1 tablet (500 mg total) by mouth 2 (two) times a day    Adjustment reaction with anxiety    Orders:  •  FLUoxetine (PROzac) 10 mg capsule; Take 1 capsule (10 mg total) by mouth daily    Gastroesophageal reflux disease without esophagitis    Orders:  •  Pepcid 40 MG tablet; Take 1 tablet (40 mg total) by mouth 2 (two) times a day           History of Present Illness   HPI    Patient presents for A1c follow up. She is stable on medications and has no complaints.     Review of Systems   Constitutional:  Negative for activity  "change, chills, diaphoresis, fatigue and fever.   HENT:  Negative for congestion, postnasal drip, rhinorrhea and sore throat.    Respiratory:  Negative for cough, shortness of breath and wheezing.    Cardiovascular:  Negative for chest pain, palpitations and leg swelling.   Gastrointestinal:  Negative for abdominal pain, constipation, diarrhea, nausea and vomiting.   Musculoskeletal:  Negative for myalgias.   Skin:  Negative for rash.   Neurological:  Negative for weakness, light-headedness and headaches.   Psychiatric/Behavioral:  The patient is not nervous/anxious.        Objective   /90 (BP Location: Left arm, Patient Position: Sitting, Cuff Size: Large)   Pulse 72   Temp (!) 94.7 °F (34.8 °C) (Temporal)   Resp 22   Ht 5' 2\" (1.575 m)   Wt 66.2 kg (146 lb)   SpO2 97%   BMI 26.70 kg/m²      Physical Exam  Constitutional:       Appearance: Normal appearance.   HENT:      Head: Normocephalic and atraumatic.   Cardiovascular:      Rate and Rhythm: Normal rate and regular rhythm.      Pulses: Normal pulses.      Heart sounds: Normal heart sounds.   Pulmonary:      Effort: Pulmonary effort is normal.      Breath sounds: Normal breath sounds.   Neurological:      General: No focal deficit present.      Mental Status: She is alert and oriented to person, place, and time.   Psychiatric:         Mood and Affect: Mood normal.         Behavior: Behavior normal.         Thought Content: Thought content normal.         Judgment: Judgment normal.         "

## 2025-01-23 NOTE — ASSESSMENT & PLAN NOTE
Lab Results   Component Value Date    HGBA1C 6.2 01/23/2025     -Continue metformin 500 mg BID  Orders:  •  POCT hemoglobin A1c  •  Blood Glucose Monitoring Suppl (OneTouch Verio Reflect) w/Device KIT; Check blood sugars twice daily. Please substitute with appropriate alternative as covered by patient's insurance. Dx: E11.65  •  glucose blood (OneTouch Verio) test strip; Check blood sugars twice daily. Please substitute with appropriate alternative as covered by patient's insurance. Dx: E11.65  •  OneTouch Delica Lancets 33G MISC; Check blood sugars twice daily. Please substitute with appropriate alternative as covered by patient's insurance. Dx: E11.65

## 2025-02-11 DIAGNOSIS — F41.9 ANXIETY: ICD-10-CM

## 2025-02-11 NOTE — TELEPHONE ENCOUNTER
Reason for call:   [x] Refill   [] Prior Auth  [] Other:     Office:   [x] PCP/Provider - [facility]   [] Specialty/Provider -     Medication: ALPRAZolam (XANAX) 0.5 mg tab     Dose/Frequency: 0.5 mg, Daily at bedtime PRN     Quantity: 30    [facility]acy: [id number]    Does the patient have enough for 3 days?   [] Yes   [x] No - Send as HP to POD

## 2025-02-12 RX ORDER — ALPRAZOLAM 0.5 MG
0.5 TABLET ORAL
Qty: 30 TABLET | Refills: 0 | Status: SHIPPED | OUTPATIENT
Start: 2025-02-12

## 2025-03-12 DIAGNOSIS — F41.9 ANXIETY: ICD-10-CM

## 2025-03-12 RX ORDER — ALPRAZOLAM 0.5 MG
0.5 TABLET ORAL
Qty: 30 TABLET | Refills: 0 | Status: SHIPPED | OUTPATIENT
Start: 2025-03-12

## 2025-03-12 NOTE — TELEPHONE ENCOUNTER
Reason for call:   [x] Refill   [] Prior Auth  [] Other:     Office:   [x] PCP/Provider -   [] Specialty/Provider -     Medication:     ALPRAZolam (XANAX) 0.5 mg Take 1 tablet (0.5 mg total) by mouth daily at bedtime as needed for anxiety        Pharmacy: [id number] - [place], [place]     Local Pharmacy   Does the patient have enough for 3 days?   [] Yes   [x] No - Send as HP to POD    Mail Away Pharmacy   Does the patient have enough for 10 days?   [] Yes   [] No - Send as HP to POD

## 2025-03-21 ENCOUNTER — TELEPHONE (OUTPATIENT)
Age: 89
End: 2025-03-21

## 2025-03-21 NOTE — TELEPHONE ENCOUNTER
PA Pepcid 40 MG SUBMITTED     to [employer]     via    []CMM-KEY:    [x]Surescripts-Case ID # [id number]  []Availity-Auth ID #  NDC #    []Faxed to plan   []Other website    []Phone call Case ID #      []PA sent as URGENT    All office notes, labs and other pertaining documents and studies sent. Clinical questions answered. Awaiting determination from insurance company.     Turnaround time for your insurance to make a decision on your Prior Authorization can take 7-21 business days.

## 2025-03-21 NOTE — TELEPHONE ENCOUNTER
PA Pepcid 40 MG APPROVED         Patient advised by          []MyChart Message  []Phone call   []LMOM  []L/M to call office as no active Communication consent on file  []Unable to leave detailed message as VM not approved on Communication consent       Pharmacy advised by    [x]Fax  []Phone call  []Secure Chat    Specialty Pharmacy    []

## 2025-03-27 DIAGNOSIS — F43.22 ADJUSTMENT REACTION WITH ANXIETY: ICD-10-CM

## 2025-03-27 RX ORDER — FLUOXETINE 10 MG/1
10 CAPSULE ORAL DAILY
Qty: 90 CAPSULE | Refills: 1 | Status: SHIPPED | OUTPATIENT
Start: 2025-03-27

## 2025-04-11 DIAGNOSIS — F41.9 ANXIETY: ICD-10-CM

## 2025-04-11 NOTE — TELEPHONE ENCOUNTER
Reason for call:   [x] Refill   [] Prior Auth  [] Other:     Office:   [x] PCP/Provider - [facility]  Authorized By: Meeta Medel MD    [] Specialty/Provider -     Medication: ALPRAZolam (XANAX) 0.5 mg tablet    Dose/Frequency: Take 1 tablet (0.5 mg total) by mouth daily at bedtime as needed for anxiety    Quantity: 30 tablet    Pharmacy: [id number] - [address]     Local Pharmacy   Does the patient have enough for 3 days?   [x] Yes   [] No - Send as HP to POD    Mail Away Pharmacy   Does the patient have enough for 10 days?   [] Yes   [] No - Send as HP to POD

## 2025-04-14 ENCOUNTER — APPOINTMENT (OUTPATIENT)
Dept: RADIOLOGY | Facility: CLINIC | Age: 89
End: 2025-04-14
Attending: STUDENT IN AN ORGANIZED HEALTH CARE EDUCATION/TRAINING PROGRAM
Payer: COMMERCIAL

## 2025-04-14 ENCOUNTER — OFFICE VISIT (OUTPATIENT)
Dept: FAMILY MEDICINE CLINIC | Facility: CLINIC | Age: 89
End: 2025-04-14
Payer: COMMERCIAL

## 2025-04-14 VITALS
DIASTOLIC BLOOD PRESSURE: 70 MMHG | RESPIRATION RATE: 16 BRPM | HEART RATE: 72 BPM | OXYGEN SATURATION: 97 % | WEIGHT: 144.6 LBS | SYSTOLIC BLOOD PRESSURE: 130 MMHG | HEIGHT: 62 IN | BODY MASS INDEX: 26.61 KG/M2 | TEMPERATURE: 98.2 F

## 2025-04-14 DIAGNOSIS — B02.9 HERPES ZOSTER WITHOUT COMPLICATION: Primary | ICD-10-CM

## 2025-04-14 DIAGNOSIS — G89.29 CHRONIC RIGHT-SIDED LOW BACK PAIN WITHOUT SCIATICA: ICD-10-CM

## 2025-04-14 DIAGNOSIS — M54.2 NECK PAIN: ICD-10-CM

## 2025-04-14 DIAGNOSIS — M54.50 CHRONIC RIGHT-SIDED LOW BACK PAIN WITHOUT SCIATICA: ICD-10-CM

## 2025-04-14 PROCEDURE — 72040 X-RAY EXAM NECK SPINE 2-3 VW: CPT

## 2025-04-14 PROCEDURE — 72110 X-RAY EXAM L-2 SPINE 4/>VWS: CPT

## 2025-04-14 PROCEDURE — 99213 OFFICE O/P EST LOW 20 MIN: CPT | Performed by: STUDENT IN AN ORGANIZED HEALTH CARE EDUCATION/TRAINING PROGRAM

## 2025-04-14 PROCEDURE — G2211 COMPLEX E/M VISIT ADD ON: HCPCS | Performed by: STUDENT IN AN ORGANIZED HEALTH CARE EDUCATION/TRAINING PROGRAM

## 2025-04-14 RX ORDER — VALACYCLOVIR HYDROCHLORIDE 1 G/1
1000 TABLET, FILM COATED ORAL EVERY 8 HOURS
Qty: 21 TABLET | Refills: 0 | Status: SHIPPED | OUTPATIENT
Start: 2025-04-14 | End: 2025-04-21

## 2025-04-14 RX ORDER — ALPRAZOLAM 0.5 MG
0.5 TABLET ORAL
Qty: 30 TABLET | Refills: 0 | Status: SHIPPED | OUTPATIENT
Start: 2025-04-14

## 2025-04-14 NOTE — PROGRESS NOTES
Name: Radha Cortes      : 1935      MRN: [id number]  Encounter Provider: Meeta Medel MD  Encounter Date: 2025   Encounter department: [facility]  :  Assessment & Plan  Herpes zoster without complication  - Vesicular rash distributed in a dermatomal pattern along the right thoracic/lumbar region of the back, not crossing the midline  Orders:  •  valACYclovir (VALTREX) 1,000 mg tablet; Take 1 tablet (1,000 mg total) by mouth every 8 (eight) hours for 7 days    Chronic right-sided low back pain without sciatica  -Continue with tylenol and heating pad  Orders:  •  EXTERNAL Referral to Home Health; Future  •  XR spine lumbar minimum 4 views non injury; Future    Neck pain    Orders:  •  XR spine cervical 2 or 3 vw injury; Future           History of Present Illness   HPI    Patient reports right sided back pain, that has been on and off but worsening these past couple days. She is having some muscle spasms. Mobility is stable, she's uses a cane to ambulate. She has been taking tylenol and heating pad. She notes these have been helping.  Patient presents with daughter who notes that she fell in February and hit her head.  She was taken to the hospital and CT of head was negative and concussion was ruled out. Patient is anticoagulated with eliquis 5 mg BID.     Review of Systems   Constitutional:  Negative for activity change, chills, diaphoresis, fatigue and fever.   HENT:  Negative for congestion, postnasal drip, rhinorrhea and sore throat.    Respiratory:  Negative for cough, shortness of breath and wheezing.    Cardiovascular:  Negative for chest pain, palpitations and leg swelling.   Gastrointestinal:  Negative for abdominal pain, constipation, diarrhea, nausea and vomiting.   Musculoskeletal:  Positive for arthralgias. Negative for myalgias.   Skin:  Positive for rash.   Neurological:  Negative for weakness, light-headedness and headaches.   Psychiatric/Behavioral:  The  "patient is not nervous/anxious.        Objective   /70   Pulse 72   Temp 98.2 °F (36.8 °C)   Resp 16   Ht 5' 2\" (1.575 m)   Wt 65.6 kg (144 lb 9.6 oz)   SpO2 97%   BMI 26.45 kg/m²      Physical Exam  Constitutional:       Appearance: Normal appearance.   HENT:      Head: Normocephalic and atraumatic.   Cardiovascular:      Rate and Rhythm: Normal rate and regular rhythm.      Pulses: Normal pulses.      Heart sounds: Normal heart sounds.   Skin:     Comments: Erythematous, vesicular rash distributed in a dermatomal pattern along the right thoracic/lumbar region of the back, not crossing the midline   Neurological:      General: No focal deficit present.      Mental Status: She is alert and oriented to person, place, and time.         "

## 2025-04-18 ENCOUNTER — TELEPHONE (OUTPATIENT)
Age: 89
End: 2025-04-18

## 2025-04-18 DIAGNOSIS — B02.29 POSTHERPETIC NEURALGIA: Primary | ICD-10-CM

## 2025-04-18 RX ORDER — LIDOCAINE 50 MG/G
OINTMENT TOPICAL AS NEEDED
Qty: 50 G | Refills: 2 | Status: SHIPPED | OUTPATIENT
Start: 2025-04-18

## 2025-04-18 NOTE — TELEPHONE ENCOUNTER
Patients daughter Lisa calling stating that patient was seen on 4/14 by Dr. Medel and diagnosed with shingles.  Lisa states that the patient is in pain due to shingles and would like to know if there is a topical cream or ointment that can be prescribed to alleviate this pain.  Does not want oral medication because may cause drowsiness and fall risk for patient.    Please have Dr. Medel review and call patients daughter Lisa if medication is being sent to pharmacy  Thank you!    Daisha Mane

## 2025-04-18 NOTE — TELEPHONE ENCOUNTER
Spoke with Lisa, Daughter, informed her that an ointment was called in. Lisa will pick it up today.  No further action at this time.

## 2025-04-21 NOTE — TELEPHONE ENCOUNTER
Patient should complete course of valtrex for shingles to reduce viral replication, limit rash duration, and decrease the risk of postherpetic neuralgia.  Shingles related neuropathic pain is common even after rash resolution. May send lidocaine patches to pharmacy for pain control.

## 2025-04-21 NOTE — TELEPHONE ENCOUNTER
Patients daughter calling stating that patient is uncomfortable still and in pain.  Would like to know if patient should continue with the valacyclovir or if continued treatment is needed at this point.  She would like to keep her mom comfortable as much a possible.    Please call patients daughter Lisa

## 2025-04-21 NOTE — TELEPHONE ENCOUNTER
Lisa wants to know if a second course of Valtrex might be helpful at this point. She took the last one today. It did wind up being a lot worse that when you saw her last Monday.

## 2025-04-21 NOTE — TELEPHONE ENCOUNTER
PA for lidocaine 5% ointment SUBMITTED to [employer]    via    []CMM-KEY:   [x]Surescripts-Case ID #[id number]    []Availity-Auth ID # NDC #   []Faxed to plan   []Other website   []Phone call Case ID #     [x]PA sent as URGENT    All office notes, labs and other pertaining documents and studies sent. Clinical questions answered. Awaiting determination from insurance company.     Turnaround time for your insurance to make a decision on your Prior Authorization can take 7-21 business days.

## 2025-04-22 NOTE — TELEPHONE ENCOUNTER
PA for LIDOCAINE 5% OINTMENT APPROVED     Date(s) approved 4/21/25-7/21/25    Case # [id number]     Patient advised by          [x][employer] Message  []Phone call   []LMOM  []L/M to call office as no active Communication consent on file  []Unable to leave detailed message as VM not approved on Communication consent       Pharmacy advised by    []Fax  [x]Phone call PATIENT PICKED UP  []Secure Chat    Specialty Pharmacy    []     Approval letter scanned into Media Yes

## 2025-05-05 ENCOUNTER — TELEPHONE (OUTPATIENT)
Age: 89
End: 2025-05-05

## 2025-05-05 NOTE — TELEPHONE ENCOUNTER
Pt daughter called in requesting a call back from the office in regards to a concern that she has about pt. Please advise.

## 2025-05-06 ENCOUNTER — RESULTS FOLLOW-UP (OUTPATIENT)
Dept: FAMILY MEDICINE CLINIC | Facility: CLINIC | Age: 89
End: 2025-05-06

## 2025-05-06 NOTE — TELEPHONE ENCOUNTER
Patients daughter called back regarding previous message.  She is frustrated that she has not gotten a call back.    At this time warm transferred to the office for assistance.

## 2025-05-06 NOTE — TELEPHONE ENCOUNTER
Spoke to Lisa.  She is very concerned about her mother.  She had Fall #2 on 4/27/25.  She also  is upset that she has not received results of her xray that she had on 4/17/25.  I noticed they were resulted today by Dr. Medel.  I read her the results.  Stated she is concerned for her safety.        She stated that she spoke to Dr. Medel at the last appt and she wants to get home PT for her mom and she has not heard anything heard anything from anyone.    I stated that [facility] does not come to [place].  I faxed all information to [facility] on 4/14/2025.      She should call them to make sure they are able to provide the service of home PT.  She stated that if they could not do PT at the home she will go to an outpatient facility. She will call back to let us know if she is going to go to out patient.  We will need a new order and fax Pat's results of her xray    OVIDIOI - I spoke to Lisa after Blanca spoke to her.  Our 2 messages are flip flopped.

## 2025-05-06 NOTE — RESULT ENCOUNTER NOTE
Tricia (Daughter Lisa) to please return my call.  Please give patient/patients daughter message from Dr. Whitlock when she returns my call.  Thank You,  Wendy/JUNO

## 2025-05-06 NOTE — TELEPHONE ENCOUNTER
Patient daughter called back informed of results. NFA at this time   
Patients daughter informed of results per Susy.  No further action required, Wendy/JUNO  
Spine appears normal, l hip internal rotation, tenderness to palpation over L hip/L femur

## 2025-05-07 NOTE — TELEPHONE ENCOUNTER
Faxed again.  Will follow up to make sure they received it     Refill request for LISINOPRIL 20 MG TABLET, ATORVASTATIN 80 MG TABLET medication.      Name of [address] [place] [address]      Last visit - 2-4-2022     Pending visit - 9-    Last refill - 3-      Medication Contract signed -   Last Pradeep hicks-         Additional Comments

## 2025-05-07 NOTE — TELEPHONE ENCOUNTER
Spoke to Manasa at [facility] to confirm that they received the fax. She stated they did and sent it to the Intake Department.    Spoke to daughter Lisa and informed her of the above information.

## 2025-05-07 NOTE — TELEPHONE ENCOUNTER
Patient daughter Lisa calling stating Tala did not get fax and they need a diagnosis before they can start Home physical therapy.  Lisa is getting frustrated and would like to make sure this gets done today.  Spoke with office and Susy is assisting another patient.  Told Lisa, Susy would call her back.    PLEASE CALL LISA AFTER 330.  Thank you

## 2025-05-12 DIAGNOSIS — F41.9 ANXIETY: ICD-10-CM

## 2025-05-12 RX ORDER — ALPRAZOLAM 0.5 MG
0.5 TABLET ORAL
Qty: 30 TABLET | Refills: 0 | Status: SHIPPED | OUTPATIENT
Start: 2025-05-12

## 2025-05-12 NOTE — TELEPHONE ENCOUNTER
Reason for call:   [x] Refill   [] Prior Auth  [] Other:     Office:   [x] PCP/Provider - Meeta Medel MD   [] Specialty/Provider -     Medication:     ALPRAZolam (XANAX) 0.5 mg tablet       Dose/Frequency:     Take 1 tablet (0.5 mg total) by mouth daily at bedtime as needed for anxiety       Quantity: 30    Pharmacy: [id number] - [place] [address] [phone]     Local Pharmacy   Does the patient have enough for 3 days?   [] Yes   [x] No - Send as HP to POD    Mail Away Pharmacy   Does the patient have enough for 10 days?   [] Yes   [] No - Send as HP to POD      Patient also called requesting refill for one touch delica plus lancets. Patient made aware medication was refilled on  01/01/2025 for 100 with 5 refills to [facility] pharmacy. Patient instructed to contact the pharmacy and speak with someone directly to obtain refills of medication. Patient advised to call back for refill if their pharmacy is unable to assist them. Patient verbalized understanding.

## 2025-05-20 ENCOUNTER — TELEPHONE (OUTPATIENT)
Age: 89
End: 2025-05-20

## 2025-05-20 NOTE — TELEPHONE ENCOUNTER
TYE calling stating that they need a diagnosis code for the physical therapy ordered otherwise medicare wont cover KANDIS Zamarripa

## 2025-06-02 DIAGNOSIS — K21.9 GASTROESOPHAGEAL REFLUX DISEASE WITHOUT ESOPHAGITIS: ICD-10-CM

## 2025-06-02 DIAGNOSIS — Z00.00 MEDICARE ANNUAL WELLNESS VISIT, SUBSEQUENT: Primary | ICD-10-CM

## 2025-06-02 DIAGNOSIS — E11.22 TYPE 2 DIABETES MELLITUS WITH STAGE 3A CHRONIC KIDNEY DISEASE, WITHOUT LONG-TERM CURRENT USE OF INSULIN (HCC): ICD-10-CM

## 2025-06-02 DIAGNOSIS — E78.2 MIXED HYPERLIPIDEMIA: ICD-10-CM

## 2025-06-02 DIAGNOSIS — N18.31 TYPE 2 DIABETES MELLITUS WITH STAGE 3A CHRONIC KIDNEY DISEASE, WITHOUT LONG-TERM CURRENT USE OF INSULIN (HCC): ICD-10-CM

## 2025-06-10 DIAGNOSIS — F41.9 ANXIETY: ICD-10-CM

## 2025-06-10 NOTE — TELEPHONE ENCOUNTER
Reason for call:   [x] Refill   [] Prior Auth  [] Other:     Office:   [x] PCP/Provider -   Ordering Department: [facility]  Authorized By: Ghanshyam Roberts MD  [] Specialty/Provider -     Medication: ALPRAZolam (XANAX) 0.5 mg tablet    Dose/Frequency: Take 1 tablet (0.5 mg total) by mouth daily at bedtime as needed for anxiety     Quantity: 30    Pharmacy: [facility] [id number] [phone]  [place]      Local Pharmacy   Does the patient have enough for 3 days?   [] Yes   [x] No - Send as HP to POD    Mail Away Pharmacy   Does the patient have enough for 10 days?   [] Yes   [x] No - Send as HP to POD

## 2025-06-11 RX ORDER — ALPRAZOLAM 0.5 MG
0.5 TABLET ORAL
Qty: 30 TABLET | Refills: 0 | Status: SHIPPED | OUTPATIENT
Start: 2025-06-11

## 2025-06-17 DIAGNOSIS — E11.9 TYPE 2 DIABETES MELLITUS WITHOUT COMPLICATION, WITHOUT LONG-TERM CURRENT USE OF INSULIN (HCC): ICD-10-CM

## 2025-07-01 DIAGNOSIS — F41.9 ANXIETY: ICD-10-CM

## 2025-07-01 DIAGNOSIS — F43.22 ADJUSTMENT REACTION WITH ANXIETY: ICD-10-CM

## 2025-07-01 NOTE — TELEPHONE ENCOUNTER
Reason for call:   [x] Refill   [] Prior Auth  [] Other:     Office:   [x] PCP/Provider - Lizy  [] Specialty/Provider -     Medication: Fluoxetine 10mg     Dose/Frequency: 1 cap daily     Quantity: 90    Pharmacy: [facility] - [address] [phone]     Mail Away Pharmacy   Does the patient have enough for 10 days?   [x] Yes   [] No - Send as HP to POD

## 2025-07-02 LAB
ALBUMIN SERPL-MCNC: 4.1 G/DL (ref 3.6–5.1)
ALBUMIN/CREAT UR: 6 MG/G CREAT
ALBUMIN/GLOB SERPL: 2 (CALC) (ref 1–2.5)
ALP SERPL-CCNC: 39 U/L (ref 37–153)
ALT SERPL-CCNC: 16 U/L (ref 6–29)
AST SERPL-CCNC: 19 U/L (ref 10–35)
BASOPHILS # BLD AUTO: 69 CELLS/UL (ref 0–200)
BASOPHILS NFR BLD AUTO: 1.1 %
BILIRUB SERPL-MCNC: 0.5 MG/DL (ref 0.2–1.2)
BUN SERPL-MCNC: 16 MG/DL (ref 7–25)
BUN/CREAT SERPL: ABNORMAL (CALC) (ref 6–22)
CALCIUM SERPL-MCNC: 9.6 MG/DL (ref 8.6–10.4)
CHLORIDE SERPL-SCNC: 102 MMOL/L (ref 98–110)
CHOLEST SERPL-MCNC: 158 MG/DL
CHOLEST/HDLC SERPL: 2.9 (CALC)
CO2 SERPL-SCNC: 31 MMOL/L (ref 20–32)
CREAT SERPL-MCNC: 0.91 MG/DL (ref 0.6–0.95)
CREAT UR-MCNC: 52 MG/DL (ref 20–275)
EOSINOPHIL # BLD AUTO: 321 CELLS/UL (ref 15–500)
EOSINOPHIL NFR BLD AUTO: 5.1 %
ERYTHROCYTE [DISTWIDTH] IN BLOOD BY AUTOMATED COUNT: 13.1 % (ref 11–15)
EST. AVERAGE GLUCOSE BLD GHB EST-MCNC: 134 MG/DL
EST. AVERAGE GLUCOSE BLD GHB EST-SCNC: 7.4 MMOL/L
GFR/BSA.PRED SERPLBLD CYS-BASED-ARV: 61 ML/MIN/1.73M2
GLOBULIN SER CALC-MCNC: 2.1 G/DL (CALC) (ref 1.9–3.7)
GLUCOSE SERPL-MCNC: 108 MG/DL (ref 65–99)
HBA1C MFR BLD: 6.3 %
HCT VFR BLD AUTO: 42.2 % (ref 35–45)
HDLC SERPL-MCNC: 55 MG/DL
HGB BLD-MCNC: 13.5 G/DL (ref 11.7–15.5)
LDLC SERPL CALC-MCNC: 78 MG/DL (CALC)
LYMPHOCYTES # BLD AUTO: 1613 CELLS/UL (ref 850–3900)
LYMPHOCYTES NFR BLD AUTO: 25.6 %
MCH RBC QN AUTO: 30.8 PG (ref 27–33)
MCHC RBC AUTO-ENTMCNC: 32 G/DL (ref 32–36)
MCV RBC AUTO: 96.3 FL (ref 80–100)
MICROALBUMIN UR-MCNC: 0.3 MG/DL
MONOCYTES # BLD AUTO: 630 CELLS/UL (ref 200–950)
MONOCYTES NFR BLD AUTO: 10 %
NEUTROPHILS # BLD AUTO: 3667 CELLS/UL (ref 1500–7800)
NEUTROPHILS NFR BLD AUTO: 58.2 %
NONHDLC SERPL-MCNC: 103 MG/DL (CALC)
PLATELET # BLD AUTO: 222 THOUSAND/UL (ref 140–400)
PMV BLD REES-ECKER: 11.1 FL (ref 7.5–12.5)
POTASSIUM SERPL-SCNC: 4.5 MMOL/L (ref 3.5–5.3)
PROT SERPL-MCNC: 6.2 G/DL (ref 6.1–8.1)
RBC # BLD AUTO: 4.38 MILLION/UL (ref 3.8–5.1)
SODIUM SERPL-SCNC: 139 MMOL/L (ref 135–146)
TRIGL SERPL-MCNC: 145 MG/DL
WBC # BLD AUTO: 6.3 THOUSAND/UL (ref 3.8–10.8)

## 2025-07-02 RX ORDER — FLUOXETINE 10 MG/1
10 CAPSULE ORAL DAILY
Qty: 90 CAPSULE | Refills: 1 | Status: SHIPPED | OUTPATIENT
Start: 2025-07-02

## 2025-07-10 ENCOUNTER — OFFICE VISIT (OUTPATIENT)
Dept: FAMILY MEDICINE CLINIC | Facility: CLINIC | Age: 89
End: 2025-07-10
Payer: COMMERCIAL

## 2025-07-10 VITALS
BODY MASS INDEX: 26.31 KG/M2 | SYSTOLIC BLOOD PRESSURE: 148 MMHG | DIASTOLIC BLOOD PRESSURE: 62 MMHG | HEART RATE: 72 BPM | WEIGHT: 143 LBS | HEIGHT: 62 IN | RESPIRATION RATE: 12 BRPM | TEMPERATURE: 97 F | OXYGEN SATURATION: 95 %

## 2025-07-10 DIAGNOSIS — E11.9 TYPE 2 DIABETES MELLITUS WITHOUT COMPLICATION, WITHOUT LONG-TERM CURRENT USE OF INSULIN (HCC): ICD-10-CM

## 2025-07-10 DIAGNOSIS — Z00.00 MEDICARE ANNUAL WELLNESS VISIT, SUBSEQUENT: Primary | ICD-10-CM

## 2025-07-10 DIAGNOSIS — F41.9 ANXIETY: ICD-10-CM

## 2025-07-10 PROCEDURE — G0439 PPPS, SUBSEQ VISIT: HCPCS | Performed by: STUDENT IN AN ORGANIZED HEALTH CARE EDUCATION/TRAINING PROGRAM

## 2025-07-10 RX ORDER — LANCETS 30 GAUGE
EACH MISCELLANEOUS
Qty: 100 EACH | Refills: 0 | Status: SHIPPED | OUTPATIENT
Start: 2025-07-10

## 2025-07-10 NOTE — PATIENT INSTRUCTIONS
Medicare Preventive Visit Patient Instructions  Thank you for completing your Welcome to Medicare Visit or Medicare Annual Wellness Visit today. Your next wellness visit will be due in one year (7/11/2026).  The screening/preventive services that you may require over the next 5-10 years are detailed below. Some tests may not apply to you based off risk factors and/or age. Screening tests ordered at today's visit but not completed yet may show as past due. Also, please note that scanned in results may not display below.  Preventive Screenings:  Service Recommendations Previous Testing/Comments   Colorectal Cancer Screening  * Colonoscopy    * Fecal Occult Blood Test (FOBT)/Fecal Immunochemical Test (FIT)  * Fecal DNA/Cologuard Test  * Flexible Sigmoidoscopy Age: 45-75 years old   Colonoscopy: every 10 years (may be performed more frequently if at higher risk)  OR  FOBT/FIT: every 1 year  OR  Cologuard: every 3 years  OR  Sigmoidoscopy: every 5 years  Screening may be recommended earlier than age 45 if at higher risk for colorectal cancer. Also, an individualized decision between you and your healthcare provider will decide whether screening between the ages of 76-85 would be appropriate. Colonoscopy: Not on file  FOBT/FIT: Not on file  Cologuard: Not on file  Sigmoidoscopy: Not on file    Screening Not Indicated     Breast Cancer Screening Age: 40+ years old  Frequency: every 1-2 years  Not required if history of left and right mastectomy Mammogram: Not on file        Cervical Cancer Screening Between the ages of 21-29, pap smear recommended once every 3 years.   Between the ages of 30-65, can perform pap smear with HPV co-testing every 5 years.   Recommendations may differ for women with a history of total hysterectomy, cervical cancer, or abnormal pap smears in past. Pap Smear: Not on file    Screening Not Indicated   Hepatitis C Screening Once for adults born between 1945 and 1965  More frequently in patients at  high risk for Hepatitis C Hep C Antibody: Not on file        Diabetes Screening 1-2 times per year if you're at risk for diabetes or have pre-diabetes Fasting glucose: No results in last 5 years (No results in last 5 years)  A1C: 6.3 % (7/1/2025)  Screening Not Indicated  History Diabetes   Cholesterol Screening Once every 5 years if you don't have a lipid disorder. May order more often based on risk factors. Lipid panel: 07/01/2025    Screening Not Indicated  History Lipid Disorder     Other Preventive Screenings Covered by Medicare:  Abdominal Aortic Aneurysm (AAA) Screening: covered once if your at risk. You're considered to be at risk if you have a family history of AAA.  Lung Cancer Screening: covers low dose CT scan once per year if you meet all of the following conditions: (1) Age 55-77; (2) No signs or symptoms of lung cancer; (3) Current smoker or have quit smoking within the last 15 years; (4) You have a tobacco smoking history of at least 20 pack years (packs per day multiplied by number of years you smoked); (5) You get a written order from a healthcare provider.  Glaucoma Screening: covered annually if you're considered high risk: (1) You have diabetes OR (2) Family history of glaucoma OR (3)  aged 50 and older OR (4)  American aged 65 and older  Osteoporosis Screening: covered every 2 years if you meet one of the following conditions: (1) You're estrogen deficient and at risk for osteoporosis based off medical history and other findings; (2) Have a vertebral abnormality; (3) On glucocorticoid therapy for more than 3 months; (4) Have primary hyperparathyroidism; (5) On osteoporosis medications and need to assess response to drug therapy.   Last bone density test (DXA Scan): Not on file.  HIV Screening: covered annually if you're between the age of 15-65. Also covered annually if you are younger than 15 and older than 65 with risk factors for HIV infection. For pregnant patients,  it is covered up to 3 times per pregnancy.    Immunizations:  Immunization Recommendations   Influenza Vaccine Annual influenza vaccination during flu season is recommended for all persons aged >= 6 months who do not have contraindications   Pneumococcal Vaccine   * Pneumococcal conjugate vaccine = PCV13 (Prevnar 13), PCV15 (Vaxneuvance), PCV20 (Prevnar 20)  * Pneumococcal polysaccharide vaccine = PPSV23 (Pneumovax) Adults 19-63 yo with certain risk factors or if 65+ yo  If never received any pneumonia vaccine: recommend Prevnar 20 (PCV20)  Give PCV20 if previously received 1 dose of PCV13 or PPSV23   Hepatitis B Vaccine 3 dose series if at intermediate or high risk (ex: diabetes, end stage renal disease, liver disease)   Respiratory syncytial virus (RSV) Vaccine - COVERED BY MEDICARE PART D  * RSVPreF3 (Arexvy) CDC recommends that adults 60 years of age and older may receive a single dose of RSV vaccine using shared clinical decision-making (SCDM)   Tetanus (Td) Vaccine - COST NOT COVERED BY MEDICARE PART B Following completion of primary series, a booster dose should be given every 10 years to maintain immunity against tetanus. Td may also be given as tetanus wound prophylaxis.   Tdap Vaccine - COST NOT COVERED BY MEDICARE PART B Recommended at least once for all adults. For pregnant patients, recommended with each pregnancy.   Shingles Vaccine (Shingrix) - COST NOT COVERED BY MEDICARE PART B  2 shot series recommended in those 19 years and older who have or will have weakened immune systems or those 50 years and older     Health Maintenance Due:  There are no preventive care reminders to display for this patient.  Immunizations Due:      Topic Date Due   • COVID-19 Vaccine (4 - 2024-25 season) 09/01/2024   • Influenza Vaccine (1) 09/01/2025     Advance Directives   What are advance directives?  Advance directives are legal documents that state your wishes and plans for medical care. These plans are made ahead of  time in case you lose your ability to make decisions for yourself. Advance directives can apply to any medical decision, such as the treatments you want, and if you want to donate organs.   What are the types of advance directives?  There are many types of advance directives, and each state has rules about how to use them. You may choose a combination of any of the following:  Living will:  This is a written record of the treatment you want. You can also choose which treatments you do not want, which to limit, and which to stop at a certain time. This includes surgery, medicine, IV fluid, and tube feedings.   Durable power of  for healthcare (DPAHC):  This is a written record that states who you want to make healthcare choices for you when you are unable to make them for yourself. This person, called a proxy, is usually a family member or a friend. You may choose more than 1 proxy.  Do not resuscitate (DNR) order:  A DNR order is used in case your heart stops beating or you stop breathing. It is a request not to have certain forms of treatment, such as CPR. A DNR order may be included in other types of advance directives.  Medical directive:  This covers the care that you want if you are in a coma, near death, or unable to make decisions for yourself. You can list the treatments you want for each condition. Treatment may include pain medicine, surgery, blood transfusions, dialysis, IV or tube feedings, and a ventilator (breathing machine).  Values history:  This document has questions about your views, beliefs, and how you feel and think about life. This information can help others choose the care that you would choose.  Why are advance directives important?  An advance directive helps you control your care. Although spoken wishes may be used, it is better to have your wishes written down. Spoken wishes can be misunderstood, or not followed. Treatments may be given even if you do not want them. An advance  directive may make it easier for your family to make difficult choices about your care.   Fall Prevention    Fall prevention  includes ways to make your home and other areas safer. It also includes ways you can move more carefully to prevent a fall. Health conditions that cause changes in your blood pressure, vision, or muscle strength and coordination may increase your risk for falls. Medicines may also increase your risk for falls if they make you dizzy, weak, or sleepy.   Fall prevention tips:   Stand or sit up slowly.    Use assistive devices as directed.    Wear shoes that fit well and have soles that .    Wear a personal alarm.    Stay active.    Manage your medical conditions.    Home Safety Tips:  Add items to prevent falls in the bathroom.    Keep paths clear.    Install bright lights in your home.    Keep items you use often on shelves within reach.    Paint or place reflective tape on the edges of your stairs.    Weight Management   Why it is important to manage your weight:  Being overweight increases your risk of health conditions such as heart disease, high blood pressure, type 2 diabetes, and certain types of cancer. It can also increase your risk for osteoarthritis, sleep apnea, and other respiratory problems. Aim for a slow, steady weight loss. Even a small amount of weight loss can lower your risk of health problems.  How to lose weight safely:  A safe and healthy way to lose weight is to eat fewer calories and get regular exercise. You can lose up about 1 pound a week by decreasing the number of calories you eat by 500 calories each day.   Healthy meal plan for weight management:  A healthy meal plan includes a variety of foods, contains fewer calories, and helps you stay healthy. A healthy meal plan includes the following:  Eat whole-grain foods more often.  A healthy meal plan should contain fiber. Fiber is the part of grains, fruits, and vegetables that is not broken down by your body.  Whole-grain foods are healthy and provide extra fiber in your diet. Some examples of whole-grain foods are whole-wheat breads and pastas, oatmeal, brown rice, and bulgur.  Eat a variety of vegetables every day.  Include dark, leafy greens such as spinach, kale, nadia greens, and mustard greens. Eat yellow and orange vegetables such as carrots, sweet potatoes, and winter squash.   Eat a variety of fruits every day.  Choose fresh or canned fruit (canned in its own juice or light syrup) instead of juice. Fruit juice has very little or no fiber.  Eat low-fat dairy foods.  Drink fat-free (skim) milk or 1% milk. Eat fat-free yogurt and low-fat cottage cheese. Try low-fat cheeses such as mozzarella and other reduced-fat cheeses.  Choose meat and other protein foods that are low in fat.  Choose beans or other legumes such as split peas or lentils. Choose fish, skinless poultry (chicken or turkey), or lean cuts of red meat (beef or pork). Before you cook meat or poultry, cut off any visible fat.   Use less fat and oil.  Try baking foods instead of frying them. Add less fat, such as margarine, sour cream, regular salad dressing and mayonnaise to foods. Eat fewer high-fat foods. Some examples of high-fat foods include french fries, doughnuts, ice cream, and cakes.  Eat fewer sweets.  Limit foods and drinks that are high in sugar. This includes candy, cookies, regular soda, and sweetened drinks.  Exercise:  Exercise at least 30 minutes per day on most days of the week. Some examples of exercise include walking, biking, dancing, and swimming. You can also fit in more physical activity by taking the stairs instead of the elevator or parking farther away from stores. Ask your healthcare provider about the best exercise plan for you.    © Copyright [employer] 2018 Information is for End User's use only and may not be sold, redistributed or otherwise used for commercial purposes. All illustrations and images included in  CareNotes® are the copyrighted property of A.D.A[employer]M., Inc. or [employer]

## 2025-07-10 NOTE — PROGRESS NOTES
Name: Radha Cortes      : 1935      MRN: [id number]  Encounter Provider: Meeta Medel MD  Encounter Date: 7/10/2025   Encounter department: [facility]  :  Assessment & Plan  Medicare annual wellness visit, subsequent            Preventive health issues were discussed with patient, and age appropriate screening tests were ordered as noted in patient's After Visit Summary. Personalized health advice and appropriate referrals for health education or preventive services given if needed, as noted in patient's After Visit Summary.    History of Present Illness     Continued neuropathic pain on right side, she had shingles recently. Follows with dermatology.        Patient Care Team:  Meeta Medel MD as PCP - General (Family Medicine)  Alvaro Aguiar MD (Gastroenterology)  Clay Brock (Ophthalmology)  Laura Maradiaga MD (Gynecology)  Zeferino Delgado (Cardiology)  Hosea Butts MD (Ophthalmology)    Review of Systems   Constitutional:  Negative for activity change, chills, diaphoresis, fatigue and fever.   HENT:  Negative for congestion, postnasal drip, rhinorrhea and sore throat.    Respiratory:  Negative for cough, shortness of breath and wheezing.    Cardiovascular:  Negative for chest pain, palpitations and leg swelling.   Gastrointestinal:  Negative for abdominal pain, constipation, diarrhea, nausea and vomiting.   Musculoskeletal:  Negative for myalgias.   Skin:  Negative for rash.   Neurological:  Negative for weakness, light-headedness and headaches.   Psychiatric/Behavioral:  The patient is not nervous/anxious.      Medical History Reviewed by provider this encounter:  Tobacco  Allergies  Meds  Problems  Med Hx  Surg Hx  Fam Hx       Annual Wellness Visit Questionnaire       Health Risk Assessment:   Patient rates overall health as good. Patient feels that their physical health rating is same. Patient is satisfied with their life. Eyesight was rated as same. Hearing  was rated as same. Patient feels that their emotional and mental health rating is same. Patients states they are never, rarely angry. Patient states they are sometimes unusually tired/fatigued. Pain experienced in the last 7 days has been none. Patient states that she has experienced no weight loss or gain in last 6 months.     Depression Screening:   PHQ-2 Score: 0  PHQ-9 Score: 0      Fall Risk Screening:   In the past year, patient has experienced: history of falling in past year    Number of falls: 2 or more  Injured during fall?: No    Feels unsteady when standing or walking?: Yes    Worried about falling?: Yes      Urinary Incontinence Screening:   Patient has not leaked urine accidently in the last six months.     Home Safety:  Patient has trouble with stairs inside or outside of their home. Patient has working smoke alarms and has working carbon monoxide detector. Home safety hazards include: none.     Nutrition:   Current diet is Regular and Diabetic.     Medications:   Patient is currently taking over-the-counter supplements. OTC medications include: see medication list. Patient is not able to manage medications.     Activities of Daily Living (ADLs)/Instrumental Activities of Daily Living (IADLs):   Walk and transfer into and out of bed and chair?: Yes  Dress and groom yourself?: Yes    Bathe or shower yourself?: Yes    Feed yourself? Yes  Do your laundry/housekeeping?: Yes  Manage your money, pay your bills and track your expenses?: No  Make your own meals?: No    Do your own shopping?: No    Previous Hospitalizations:   Any hospitalizations or ED visits within the last 12 months?: Yes    How many hospitalizations have you had in the last year?: 1-2    Advance Care Planning:   Living will: Yes    Durable POA for healthcare: Yes    Advanced directive: Yes      Preventive Screenings      Cardiovascular Screening:    General: Screening Not Indicated and History Lipid Disorder      Diabetes Screening:      "General: Screening Not Indicated and History Diabetes      Colorectal Cancer Screening:     General: Screening Not Indicated      Cervical Cancer Screening:    General: Screening Not Indicated      Lung Cancer Screening:     General: Screening Not Indicated    Immunizations:  - Immunizations due: Zoster (Shingrix)    Screening, Brief Intervention, and Referral to Treatment (SBIRT)     Screening  Typical number of drinks in a day: 0  Typical number of drinks in a week: 0  Interpretation: Low risk drinking behavior.    Single Item Drug Screening:  How often have you used an illegal drug (including marijuana) or a prescription medication for non-medical reasons in the past year? never    Single Item Drug Screen Score: 0  Interpretation: Negative screen for possible drug use disorder    Social Drivers of Health     Financial Resource Strain: Low Risk  (4/12/2023)    Overall Financial Resource Strain (CARDIA)    • Difficulty of Paying Living Expenses: Not hard at all   Food Insecurity: No Food Insecurity (7/10/2025)    Nursing - Inadequate Food Risk Classification    • Worried About Running Out of Food in the Last Year: Never true    • Ran Out of Food in the Last Year: Never true   Transportation Needs: No Transportation Needs (7/10/2025)    PRAPARE - Transportation    • Lack of Transportation (Medical): No    • Lack of Transportation (Non-Medical): No   Housing Stability: Low Risk  (7/10/2025)    Housing Stability Vital Sign    • Unable to Pay for Housing in the Last Year: No    • Number of Times Moved in the Last Year: 0    • Homeless in the Last Year: No   Utilities: Not At Risk (7/10/2025)    [facility] Utilities    • Threatened with loss of utilities: No     No results found.    Objective   /62   Pulse 72   Temp (!) 97 °F (36.1 °C) (Temporal)   Resp 12   Ht 5' 2\" (1.575 m)   Wt 64.9 kg (143 lb)   SpO2 95%   BMI 26.16 kg/m²     Physical Exam  Vitals and nursing note reviewed.   Constitutional:       General: " She is not in acute distress.     Appearance: She is well-developed.   HENT:      Head: Normocephalic and atraumatic.     Eyes:      Conjunctiva/sclera: Conjunctivae normal.       Cardiovascular:      Rate and Rhythm: Normal rate and regular rhythm.      Heart sounds: No murmur heard.  Pulmonary:      Effort: Pulmonary effort is normal. No respiratory distress.      Breath sounds: Normal breath sounds.   Abdominal:      Palpations: Abdomen is soft.      Tenderness: There is no abdominal tenderness.     Musculoskeletal:         General: No swelling.      Cervical back: Neck supple.     Skin:     General: Skin is warm and dry.      Capillary Refill: Capillary refill takes less than 2 seconds.     Neurological:      Mental Status: She is alert.     Psychiatric:         Mood and Affect: Mood normal.

## 2025-07-10 NOTE — TELEPHONE ENCOUNTER
Reason for call:   [x] Refill   [] Prior Auth  [] Other:     Office:   [x] PCP/Provider - Meeta Medel MD   [] Specialty/Provider -     Medication: ALPRAZolam (XANAX) 0.5 mg tablet / Take 1 tablet (0.5 mg total) by mouth daily at bedtime as needed for anxiety     Lancets (OneTouch Delica Plus [id number]) MISC / TEST TWO TIMES A DAY BEFORE BREAKFAST AND BEFORE DINNER    Pharmacy: [id number] - [place] [address] [address]     Local Pharmacy   Does the patient have enough for 3 days?   [] Yes   [x] No - Send as HP to POD

## 2025-07-11 DIAGNOSIS — F41.9 ANXIETY: ICD-10-CM

## 2025-07-11 RX ORDER — ALPRAZOLAM 0.5 MG
TABLET ORAL
Qty: 30 TABLET | Refills: 0 | OUTPATIENT
Start: 2025-07-11

## 2025-07-11 RX ORDER — ALPRAZOLAM 0.5 MG
0.5 TABLET ORAL
Qty: 30 TABLET | Refills: 0 | OUTPATIENT
Start: 2025-07-11

## 2025-07-11 NOTE — TELEPHONE ENCOUNTER
Requested medication(s) are due for refill today: No  Patient has already received a courtesy refill: Yes  Other reason request has been forwarded to provider: Duplicate request - confirmed receipt by pharmacy 7/10

## 2025-07-11 NOTE — TELEPHONE ENCOUNTER
Pt daughter calling VERY upset this medication was not sent in yesterday during visit. She was very frustrated on the phone with the office. Pt daughter was nasty on phone as well and wants medication to be sent ASAP.   Pt daughter advised this medication must be signed by provider. She is unhappy with office, unhappy with the network, unhappy with the RX line since she can not speak directly to office   Grievance line # given & pt daughter wants a phone call as soon as medication is sent

## 2025-07-12 ENCOUNTER — TELEPHONE (OUTPATIENT)
Dept: OTHER | Facility: OTHER | Age: 89
End: 2025-07-12

## 2025-07-12 DIAGNOSIS — F41.9 ANXIETY: ICD-10-CM

## 2025-07-12 RX ORDER — ALPRAZOLAM 0.5 MG
0.5 TABLET ORAL
Qty: 30 TABLET | Refills: 0 | Status: SHIPPED | OUTPATIENT
Start: 2025-07-12

## 2025-07-14 RX ORDER — ALPRAZOLAM 0.5 MG
TABLET ORAL
Qty: 30 TABLET | Refills: 0 | OUTPATIENT
Start: 2025-07-14

## 2025-07-14 NOTE — TELEPHONE ENCOUNTER
Requested medication(s) are due for refill today: No  Patient has already received a courtesy refill: No  Other reason request has been forwarded to provider: Duplicate - please refuse